# Patient Record
Sex: MALE | Race: WHITE | NOT HISPANIC OR LATINO | Employment: OTHER | URBAN - METROPOLITAN AREA
[De-identification: names, ages, dates, MRNs, and addresses within clinical notes are randomized per-mention and may not be internally consistent; named-entity substitution may affect disease eponyms.]

---

## 2017-02-22 ENCOUNTER — CONVERSION ENCOUNTER (OUTPATIENT)
Dept: FAMILY MEDICINE CLINIC | Facility: CLINIC | Age: 62
End: 2017-02-22

## 2017-02-22 LAB
ALBUMIN SERPL-MCNC: 4.5 G/DL (ref 3.6–5.1)
ALBUMIN/GLOB SERPL: ABNORMAL {RATIO} (ref 1–2.5)
ALP SERPL-CCNC: 61 UNITS/L (ref 40–115)
ALT SERPL-CCNC: 18 UNITS/L (ref 9–46)
AST SERPL-CCNC: 21 UNITS/L (ref 10–35)
BILIRUB SERPL-MCNC: 0.9 MG/DL (ref 0.2–1.2)
BUN SERPL-MCNC: 36 MG/DL (ref 7–25)
BUN/CREAT SERPL: ABNORMAL (ref 6–22)
CALCIUM SERPL-MCNC: 9.6 MG/DL (ref 8.6–10.3)
CHLORIDE SERPL-SCNC: 105 MMOL/L (ref 98–110)
CHOLEST SERPL-MCNC: 215 MG/DL (ref 125–200)
CHOLEST/HDLC SERPL: ABNORMAL {RATIO}
CO2 CONTENT VENOUS: 27 MMOL/L (ref 20–31)
CONV TOTAL PROTEIN: 7.5 G/DL (ref 6.1–8.1)
CREAT UR-MCNC: 0.94 MG/DL (ref 0.7–1.25)
GLOBULIN UR ELPH-MCNC: ABNORMAL G/DL (ref 1.9–3.7)
GLUCOSE SERPL-MCNC: 85 MG/DL (ref 65–99)
HDLC SERPL-MCNC: 81 MG/DL
LDLC SERPL CALC-MCNC: ABNORMAL MG/DL
POTASSIUM SERPL-SCNC: 4.3 MMOL/L (ref 3.5–5.3)
PSA SERPL-MCNC: 0.6 NG/ML
SODIUM SERPL-SCNC: 138 MMOL/L (ref 135–146)
TRIGL SERPL-MCNC: 69 MG/DL
TSH SERPL-ACNC: 6.9 MICROINTL UNITS/ML (ref 0.4–4.5)

## 2017-04-24 ENCOUNTER — ON CAMPUS - OUTPATIENT (AMBULATORY)
Dept: URBAN - METROPOLITAN AREA HOSPITAL 2 | Facility: HOSPITAL | Age: 62
End: 2017-04-24
Payer: COMMERCIAL

## 2017-04-24 ENCOUNTER — OFFICE (AMBULATORY)
Dept: URBAN - METROPOLITAN AREA CLINIC 64 | Facility: CLINIC | Age: 62
End: 2017-04-24

## 2017-04-24 VITALS
DIASTOLIC BLOOD PRESSURE: 81 MMHG | DIASTOLIC BLOOD PRESSURE: 75 MMHG | HEART RATE: 107 BPM | OXYGEN SATURATION: 100 % | SYSTOLIC BLOOD PRESSURE: 129 MMHG | SYSTOLIC BLOOD PRESSURE: 125 MMHG | DIASTOLIC BLOOD PRESSURE: 85 MMHG | RESPIRATION RATE: 16 BRPM | OXYGEN SATURATION: 97 % | DIASTOLIC BLOOD PRESSURE: 65 MMHG | HEART RATE: 86 BPM | WEIGHT: 185 LBS | OXYGEN SATURATION: 99 % | DIASTOLIC BLOOD PRESSURE: 67 MMHG | OXYGEN SATURATION: 98 % | HEART RATE: 71 BPM | HEART RATE: 63 BPM | HEART RATE: 69 BPM | RESPIRATION RATE: 17 BRPM | SYSTOLIC BLOOD PRESSURE: 109 MMHG | SYSTOLIC BLOOD PRESSURE: 123 MMHG | SYSTOLIC BLOOD PRESSURE: 124 MMHG | DIASTOLIC BLOOD PRESSURE: 78 MMHG | HEART RATE: 74 BPM | HEART RATE: 76 BPM | HEIGHT: 71 IN | RESPIRATION RATE: 18 BRPM | SYSTOLIC BLOOD PRESSURE: 151 MMHG | SYSTOLIC BLOOD PRESSURE: 128 MMHG | TEMPERATURE: 98.6 F

## 2017-04-24 DIAGNOSIS — K63.5 POLYP OF COLON: ICD-10-CM

## 2017-04-24 DIAGNOSIS — K64.0 FIRST DEGREE HEMORRHOIDS: ICD-10-CM

## 2017-04-24 DIAGNOSIS — Z86.010 PERSONAL HISTORY OF COLONIC POLYPS: ICD-10-CM

## 2017-04-24 PROBLEM — D12.0 BENIGN NEOPLASM OF CECUM: Status: ACTIVE | Noted: 2017-04-24

## 2017-04-24 LAB
GI HISTOLOGY: A. UNSPECIFIED: (no result)
GI HISTOLOGY: PDF REPORT: (no result)

## 2017-04-24 PROCEDURE — 45380 COLONOSCOPY AND BIOPSY: CPT | Mod: 33 | Performed by: INTERNAL MEDICINE

## 2017-04-24 PROCEDURE — 88305 TISSUE EXAM BY PATHOLOGIST: CPT | Performed by: INTERNAL MEDICINE

## 2017-04-24 RX ADMIN — PROPOFOL: 10 INJECTION, EMULSION INTRAVENOUS at 08:15

## 2017-05-12 ENCOUNTER — HOSPITAL ENCOUNTER (OUTPATIENT)
Dept: FAMILY MEDICINE CLINIC | Facility: CLINIC | Age: 62
Setting detail: SPECIMEN
Discharge: HOME | End: 2017-05-12
Attending: FAMILY MEDICINE | Admitting: FAMILY MEDICINE

## 2017-05-12 LAB
T3 SERPL-MCNC: 0.78 NG/ML (ref 0.87–1.78)
T4 FREE SERPL-MCNC: 1.2 NG/DL (ref 0.58–1.64)
TSH SERPL-ACNC: 1.06 UIU/ML (ref 0.34–5.6)

## 2017-12-04 ENCOUNTER — CONVERSION ENCOUNTER (OUTPATIENT)
Dept: FAMILY MEDICINE CLINIC | Facility: CLINIC | Age: 62
End: 2017-12-04

## 2018-03-07 ENCOUNTER — HOSPITAL ENCOUNTER (OUTPATIENT)
Dept: FAMILY MEDICINE CLINIC | Facility: CLINIC | Age: 63
Setting detail: SPECIMEN
Discharge: HOME | End: 2018-03-07
Attending: FAMILY MEDICINE | Admitting: FAMILY MEDICINE

## 2019-03-06 ENCOUNTER — HOSPITAL ENCOUNTER (OUTPATIENT)
Dept: PHYSICAL THERAPY | Facility: HOSPITAL | Age: 64
Setting detail: RECURRING SERIES
Discharge: HOME | End: 2019-05-14
Attending: FAMILY MEDICINE | Admitting: FAMILY MEDICINE

## 2019-03-15 ENCOUNTER — HOSPITAL ENCOUNTER (OUTPATIENT)
Dept: FAMILY MEDICINE CLINIC | Facility: CLINIC | Age: 64
Setting detail: SPECIMEN
Discharge: HOME | End: 2019-03-15
Attending: FAMILY MEDICINE | Admitting: FAMILY MEDICINE

## 2019-03-15 LAB
ALBUMIN SERPL-MCNC: 4.2 G/DL (ref 3.5–4.8)
ALBUMIN/GLOB SERPL: 1.4 {RATIO} (ref 1–1.7)
ALP SERPL-CCNC: 62 IU/L (ref 32–91)
ALT SERPL-CCNC: 19 IU/L (ref 17–63)
ANION GAP SERPL CALC-SCNC: 13.5 MMOL/L (ref 10–20)
AST SERPL-CCNC: 18 IU/L (ref 15–41)
BASOPHILS # BLD AUTO: 0 10*3/UL (ref 0–0.2)
BASOPHILS NFR BLD AUTO: 1 % (ref 0–2)
BILIRUB SERPL-MCNC: 0.8 MG/DL (ref 0.3–1.2)
BILIRUB UR QL STRIP: NEGATIVE MG/DL
BUN SERPL-MCNC: 24 MG/DL (ref 8–20)
BUN/CREAT SERPL: 24 (ref 6.2–20.3)
CALCIUM SERPL-MCNC: 9.3 MG/DL (ref 8.9–10.3)
CASTS URNS QL MICRO: ABNORMAL /[LPF]
CHLORIDE SERPL-SCNC: 105 MMOL/L (ref 101–111)
CHOLEST SERPL-MCNC: 192 MG/DL
CHOLEST/HDLC SERPL: 2.7 {RATIO}
COLOR UR: YELLOW
CONV BACTERIA IN URINE MICRO: NEGATIVE
CONV CLARITY OF URINE: CLEAR
CONV CO2: 25 MMOL/L (ref 22–32)
CONV HYALINE CASTS IN URINE MICRO: 0 /[LPF] (ref 0–5)
CONV LDL CHOLESTEROL DIRECT: 106 MG/DL (ref 0–100)
CONV PROTEIN IN URINE BY AUTOMATED TEST STRIP: NEGATIVE MG/DL
CONV SMALL ROUND CELLS: ABNORMAL /[HPF]
CONV TOTAL PROTEIN: 7.2 G/DL (ref 6.1–7.9)
CONV UROBILINOGEN IN URINE BY AUTOMATED TEST STRIP: 0.2 MG/DL
CREAT UR-MCNC: 1 MG/DL (ref 0.7–1.2)
CULTURE INDICATED?: ABNORMAL
DIFFERENTIAL METHOD BLD: (no result)
EOSINOPHIL # BLD AUTO: 0.2 10*3/UL (ref 0–0.3)
EOSINOPHIL # BLD AUTO: 4 % (ref 0–3)
ERYTHROCYTE [DISTWIDTH] IN BLOOD BY AUTOMATED COUNT: 13.6 % (ref 11.5–14.5)
GLOBULIN UR ELPH-MCNC: 3 G/DL (ref 2.5–3.8)
GLUCOSE SERPL-MCNC: 92 MG/DL (ref 65–99)
GLUCOSE UR QL: NEGATIVE MG/DL
HCT VFR BLD AUTO: 44.4 % (ref 40–54)
HDLC SERPL-MCNC: 71 MG/DL
HGB BLD-MCNC: 15 G/DL (ref 14–18)
HGB UR QL STRIP: ABNORMAL
KETONES UR QL STRIP: NEGATIVE MG/DL
LDLC/HDLC SERPL: 1.5 {RATIO}
LEUKOCYTE ESTERASE UR QL STRIP: NEGATIVE
LIPID INTERPRETATION: ABNORMAL
LYMPHOCYTES # BLD AUTO: 1 10*3/UL (ref 0.8–4.8)
LYMPHOCYTES NFR BLD AUTO: 20 % (ref 18–42)
MCH RBC QN AUTO: 31.4 PG (ref 26–32)
MCHC RBC AUTO-ENTMCNC: 33.9 G/DL (ref 32–36)
MCV RBC AUTO: 92.8 FL (ref 80–94)
MONOCYTES # BLD AUTO: 0.4 10*3/UL (ref 0.1–1.3)
MONOCYTES NFR BLD AUTO: 9 % (ref 2–11)
NEUTROPHILS # BLD AUTO: 3.4 10*3/UL (ref 2.3–8.6)
NEUTROPHILS NFR BLD AUTO: 66 % (ref 50–75)
NITRITE UR QL STRIP: NEGATIVE
NRBC BLD AUTO-RTO: 0 /100{WBCS}
NRBC/RBC NFR BLD MANUAL: 0 10*3/UL
PH UR STRIP.AUTO: 7 [PH] (ref 4.5–8)
PLATELET # BLD AUTO: 282 10*3/UL (ref 150–450)
PMV BLD AUTO: 7.6 FL (ref 7.4–10.4)
POTASSIUM SERPL-SCNC: 4.5 MMOL/L (ref 3.6–5.1)
RBC # BLD AUTO: 4.78 10*6/UL (ref 4.6–6)
RBC #/AREA URNS HPF: 3 /[HPF] (ref 0–3)
SODIUM SERPL-SCNC: 139 MMOL/L (ref 136–144)
SP GR UR: 1.01 (ref 1–1.03)
SPERM URNS QL MICRO: ABNORMAL /[HPF]
SQUAMOUS SPT QL MICRO: 0 /[HPF] (ref 0–5)
TRIGL SERPL-MCNC: 59 MG/DL
UNIDENT CRYS URNS QL MICRO: ABNORMAL /[HPF]
VLDLC SERPL CALC-MCNC: 15.7 MG/DL
WBC # BLD AUTO: 5 10*3/UL (ref 4.5–11.5)
WBC #/AREA URNS HPF: 0 /[HPF] (ref 0–5)
YEAST SPEC QL WET PREP: ABNORMAL /[HPF]

## 2019-04-10 ENCOUNTER — OFFICE VISIT (OUTPATIENT)
Dept: NEUROSURGERY | Facility: CLINIC | Age: 64
End: 2019-04-10

## 2019-04-10 VITALS
HEIGHT: 71 IN | SYSTOLIC BLOOD PRESSURE: 130 MMHG | DIASTOLIC BLOOD PRESSURE: 70 MMHG | BODY MASS INDEX: 23.74 KG/M2 | WEIGHT: 169.6 LBS | HEART RATE: 70 BPM

## 2019-04-10 DIAGNOSIS — M62.81 MUSCLE WEAKNESS OF LEFT ARM: ICD-10-CM

## 2019-04-10 DIAGNOSIS — M54.12 CERVICAL RADICULOPATHY: Primary | ICD-10-CM

## 2019-04-10 PROCEDURE — 99204 OFFICE O/P NEW MOD 45 MIN: CPT | Performed by: PHYSICIAN ASSISTANT

## 2019-04-10 RX ORDER — LEVOTHYROXINE SODIUM 0.15 MG/1
150 TABLET ORAL DAILY
COMMUNITY
Start: 2019-03-16 | End: 2019-12-12 | Stop reason: SDUPTHER

## 2019-04-10 RX ORDER — AMLODIPINE BESYLATE 5 MG/1
5 TABLET ORAL DAILY
COMMUNITY
Start: 2019-03-12 | End: 2019-08-17 | Stop reason: SDUPTHER

## 2019-04-10 RX ORDER — DOXAZOSIN MESYLATE 4 MG/1
4 TABLET ORAL NIGHTLY
COMMUNITY
Start: 2019-03-16 | End: 2019-12-12 | Stop reason: SDUPTHER

## 2019-04-10 RX ORDER — MELOXICAM 15 MG/1
15 TABLET ORAL DAILY
COMMUNITY
Start: 2019-03-19 | End: 2019-05-08 | Stop reason: HOSPADM

## 2019-04-10 NOTE — PROGRESS NOTES
Subjective   Patient ID: John Jones is a 63 y.o. male is here today as a self referral for neck and left arm pain.  He denies any cause or injury.  He is currently going to PT with mild to moderate relief of arm pain but still has arm weakness and numbness. Mr. Jones takes Mobic for knee pain.      Neck Pain    This is a new problem. The current episode started more than 1 month ago (8-10 weeks ). The problem occurs constantly. The problem has been gradually worsening. The pain is associated with nothing. The pain is present in the left side. The quality of the pain is described as shooting, stabbing and aching. The pain is at a severity of 0/10. The patient is experiencing no pain. The symptoms are aggravated by position. Associated symptoms include numbness, photophobia, tingling and weakness. Pertinent negatives include no headaches. Treatments tried: PT  The treatment provided mild relief.   Arm Pain    The pain is present in the left shoulder and upper left arm. The pain radiates to the left arm. The pain has been intermittent since the incident. Associated symptoms include muscle weakness, numbness and tingling. Treatments tried: PT  The treatment provided moderate relief.       The following portions of the patient's history were reviewed and updated as appropriate: allergies, current medications, past family history, past medical history, past social history, past surgical history and problem list.    Review of Systems   Eyes: Positive for photophobia.   Genitourinary: Negative for difficulty urinating.   Musculoskeletal: Positive for arthralgias, joint swelling, neck pain (L arm pain ) and neck stiffness. Negative for gait problem.   Neurological: Positive for tingling, weakness and numbness. Negative for headaches.   All other systems reviewed and are negative.      Objective   Physical Exam   Constitutional: He is oriented to person, place, and time. He appears well-developed and well-nourished.   HENT:    Head: Normocephalic and atraumatic.   Right Ear: External ear normal.   Left Ear: External ear normal.   Eyes: Conjunctivae and EOM are normal. Pupils are equal, round, and reactive to light. Right eye exhibits no discharge. Left eye exhibits no discharge.   Neck: Normal range of motion. Neck supple. No tracheal deviation present.   Cardiovascular: Intact distal pulses.   Pulmonary/Chest: Effort normal. No stridor. No respiratory distress.   Musculoskeletal: Normal range of motion. He exhibits no edema, tenderness or deformity.   Neurological: He is alert and oriented to person, place, and time. He has normal reflexes. He displays no atrophy, no tremor and normal reflexes. No cranial nerve deficit or sensory deficit. He exhibits normal muscle tone. He displays a negative Romberg sign. He displays no seizure activity. Coordination and gait normal.   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   Psychiatric: He has a normal mood and affect. His behavior is normal. Judgment and thought content normal.   Nursing note and vitals reviewed.    Neurologic Exam     Mental Status   Oriented to person, place, and time.     Cranial Nerves     CN III, IV, VI   Pupils are equal, round, and reactive to light.  Extraocular motions are normal.       Assessment/Plan   Independent Review of Radiographic Studies:      Medical Decision Making:    Mr. Jones came to see us today for a 3-4-month history of pain that began in the left scapula and upper arm.  The pain began without accident or injury and was initially quite severe.  Over time the pain has actually resolved but unfortunately he continues to have numbness and tingling down the left arm and weakness as well.  No significant neck pain or gait issues or incontinence.  He has been going to  physical therapy for over a month which again did help with the pain but not the weakness.  He has not had any imaging or other treatment.  His exam does demonstrate atrophy of the left bicep however he is not certain if this is entirely new as he did have a previous bicep tear and since that time noticed his bicep looked smaller.  However he does have left bicep weakness as well as left  weakness.  He is left-handed and this is limiting his ability to perform his usual daily activities.    No long tract signs.    I will send him for an MRI and x-rays and have him follow-up thereafter with Dr. Phoenix.  He understands that given the weakness if the imaging confirms significant nerve compression surgery would likely be discussed rather than considering any additional nonoperative measures.  He will call in the interim with any questions or concerns or changes.  John was seen today for neck pain and arm pain.    Diagnoses and all orders for this visit:    Cervical radiculopathy  -     MRI Cervical Spine Without Contrast; Future  -     XR spine cervical complete w flex ext; Future    Muscle weakness of left arm  -     MRI Cervical Spine Without Contrast; Future  -     XR spine cervical complete w flex ext; Future      Return for follow up after radiology test with Dr. Phoenix (ASAP after MRI). .

## 2019-04-11 ENCOUNTER — TELEPHONE (OUTPATIENT)
Dept: NEUROSURGERY | Facility: CLINIC | Age: 64
End: 2019-04-11

## 2019-04-22 ENCOUNTER — HOSPITAL ENCOUNTER (OUTPATIENT)
Dept: GENERAL RADIOLOGY | Facility: HOSPITAL | Age: 64
Discharge: HOME | End: 2019-04-22

## 2019-04-22 ENCOUNTER — HOSPITAL ENCOUNTER (OUTPATIENT)
Dept: MRI IMAGING | Facility: HOSPITAL | Age: 64
Discharge: HOME | End: 2019-04-22
Admitting: PHYSICIAN ASSISTANT

## 2019-04-22 DIAGNOSIS — M62.81 MUSCLE WEAKNESS OF LEFT ARM: ICD-10-CM

## 2019-04-22 DIAGNOSIS — M54.12 CERVICAL RADICULOPATHY: ICD-10-CM

## 2019-04-22 PROCEDURE — 72141 MRI NECK SPINE W/O DYE: CPT

## 2019-04-22 PROCEDURE — 72052 X-RAY EXAM NECK SPINE 6/>VWS: CPT

## 2019-04-26 NOTE — PROGRESS NOTES
Subjective   Patient ID: John Jones is a 63 y.o. male is here today for follow-up with a new Cervical MRI and XR that was ordered for neck pain that radiates into his left arm with numbness, tingling and weakness.    History of Present Illness     This patient returns today.  He continues with weakness and numbness in his left arm.  Did physical therapy and the pain went away but his arm continued with weakness.    The following portions of the patient's history were reviewed and updated as appropriate: allergies, current medications, past family history, past medical history, past social history, past surgical history and problem list.    Review of Systems   Respiratory: Negative for chest tightness and shortness of breath.    Cardiovascular: Negative for chest pain.   Musculoskeletal: Positive for neck pain.        Left arm pain   Neurological: Positive for weakness and numbness.        Positive for tingling   All other systems reviewed and are negative.      Objective   Physical Exam   Constitutional: He is oriented to person, place, and time. He appears well-developed and well-nourished.   Neurological: He is oriented to person, place, and time.     Neurologic Exam     Mental Status   Oriented to person, place, and time.       Assessment/Plan   Independent Review of Radiographic Studies:      I reviewed his plain films and his MRI myself.  Plain films show severe degenerative disc disease at C3-4 and C5-6 as well as C6-7.  There is no evidence of abnormal movement on flexion and extension films however.  On the MRI itself there is a widely patent canal and neuroforamina at C2-3.  C3-4 shows some central stenosis and a spondylolisthesis at that level.  C4-5 shows some central stenosis although mild as well as foraminal stenosis especially on the left.  C5-6 shows severe canal stenosis with cord compression and C6-7 shows a large herniated disc on the left side.  C7-T1 looks okay.    Medical Decision Making:       I told the patient and his wife about the imaging.  I told him that there is stenosis at several levels of the cervical spine but his symptoms seem to be coming mostly from C5-6 and C6-7.  I told him if we correct these it may still be necessary to do something to the other levels in the future but right now I do not think they are contributing very much.  I told the patient about the surgery which is called an anterior cervical discectomy.  I explained that there is an 80% chance of getting rid of the arm pain and any other arm symptoms.  I also explained that the patient would still have neck pain.  Initially this will be quite severe however it will improve with healing from the surgery.  There is also a risk of infection, bleeding, paralysis, and anesthetic risk.  There is a risk of damage to the soft tissues of the neck such as the esophagus, carotids, and trachea.  All of these risks are about 2 or 3%.  There is about a 5% chance of nonunion or failure of the instrumentation.  There is also a about a 5% chance of hoarseness and trouble swallowing do to damage to the recurrent laryngeal nerve.  We discussed the postoperative hospital and home course as well.  The patient does ask to proceed.    He will need to be scheduled for a: C5-C7 anterior cervical discectomy, fusion and instrumentation    John was seen today for neck pain.    Diagnoses and all orders for this visit:    Cervical radiculopathy  -     Case Request; Standing  -     ceFAZolin (ANCEF) 2 g in sodium chloride 0.9 % 100 mL IVPB  -     Case Request    Other orders  -     Follow anesthesia standing orders.  -     Obtain informed consent  -     Provide NPO Instructions to Patient; Future  -     SCD (sequential compression device)- to be placed on patient in Pre-op; Standing      Return for 2-3 week post op.

## 2019-04-29 ENCOUNTER — OFFICE VISIT (OUTPATIENT)
Dept: NEUROSURGERY | Facility: CLINIC | Age: 64
End: 2019-04-29

## 2019-04-29 VITALS — DIASTOLIC BLOOD PRESSURE: 77 MMHG | SYSTOLIC BLOOD PRESSURE: 147 MMHG | HEART RATE: 71 BPM

## 2019-04-29 DIAGNOSIS — M54.12 CERVICAL RADICULOPATHY: Primary | ICD-10-CM

## 2019-04-29 PROCEDURE — 99213 OFFICE O/P EST LOW 20 MIN: CPT | Performed by: NEUROLOGICAL SURGERY

## 2019-04-29 RX ORDER — CEFAZOLIN SODIUM 2 G/100ML
2 INJECTION, SOLUTION INTRAVENOUS ONCE
Status: CANCELLED | OUTPATIENT
Start: 2019-05-06 | End: 2019-04-29

## 2019-04-30 ENCOUNTER — APPOINTMENT (OUTPATIENT)
Dept: PREADMISSION TESTING | Facility: HOSPITAL | Age: 64
End: 2019-04-30

## 2019-04-30 VITALS
TEMPERATURE: 97.7 F | HEART RATE: 72 BPM | BODY MASS INDEX: 23.59 KG/M2 | RESPIRATION RATE: 16 BRPM | OXYGEN SATURATION: 99 % | WEIGHT: 168.5 LBS | HEIGHT: 71 IN | SYSTOLIC BLOOD PRESSURE: 141 MMHG | DIASTOLIC BLOOD PRESSURE: 77 MMHG

## 2019-04-30 LAB
ANION GAP SERPL CALCULATED.3IONS-SCNC: 13 MMOL/L
BUN BLD-MCNC: 36 MG/DL (ref 8–23)
BUN/CREAT SERPL: 37.9 (ref 7–25)
CALCIUM SPEC-SCNC: 9.2 MG/DL (ref 8.6–10.5)
CHLORIDE SERPL-SCNC: 104 MMOL/L (ref 98–107)
CO2 SERPL-SCNC: 23 MMOL/L (ref 22–29)
CREAT BLD-MCNC: 0.95 MG/DL (ref 0.76–1.27)
DEPRECATED RDW RBC AUTO: 45.9 FL (ref 37–54)
ERYTHROCYTE [DISTWIDTH] IN BLOOD BY AUTOMATED COUNT: 13.4 % (ref 12.3–15.4)
GFR SERPL CREATININE-BSD FRML MDRD: 80 ML/MIN/1.73
GLUCOSE BLD-MCNC: 91 MG/DL (ref 65–99)
HCT VFR BLD AUTO: 41.8 % (ref 37.5–51)
HGB BLD-MCNC: 13.7 G/DL (ref 13–17.7)
MCH RBC QN AUTO: 30.7 PG (ref 26.6–33)
MCHC RBC AUTO-ENTMCNC: 32.8 G/DL (ref 31.5–35.7)
MCV RBC AUTO: 93.7 FL (ref 79–97)
PLATELET # BLD AUTO: 267 10*3/MM3 (ref 140–450)
PMV BLD AUTO: 9.7 FL (ref 6–12)
POTASSIUM BLD-SCNC: 3.6 MMOL/L (ref 3.5–5.2)
RBC # BLD AUTO: 4.46 10*6/MM3 (ref 4.14–5.8)
SODIUM BLD-SCNC: 140 MMOL/L (ref 136–145)
WBC NRBC COR # BLD: 7.9 10*3/MM3 (ref 3.4–10.8)

## 2019-04-30 PROCEDURE — 85027 COMPLETE CBC AUTOMATED: CPT | Performed by: NEUROLOGICAL SURGERY

## 2019-04-30 PROCEDURE — 80048 BASIC METABOLIC PNL TOTAL CA: CPT | Performed by: NEUROLOGICAL SURGERY

## 2019-04-30 PROCEDURE — 93010 ELECTROCARDIOGRAM REPORT: CPT | Performed by: INTERNAL MEDICINE

## 2019-04-30 PROCEDURE — 36415 COLL VENOUS BLD VENIPUNCTURE: CPT

## 2019-04-30 PROCEDURE — 93005 ELECTROCARDIOGRAM TRACING: CPT

## 2019-05-06 ENCOUNTER — HOSPITAL ENCOUNTER (OUTPATIENT)
Facility: HOSPITAL | Age: 64
Discharge: HOME | End: 2019-05-08
Attending: NEUROLOGICAL SURGERY | Admitting: NEUROLOGICAL SURGERY

## 2019-05-06 ENCOUNTER — ANESTHESIA (OUTPATIENT)
Dept: PERIOP | Facility: HOSPITAL | Age: 64
End: 2019-05-06

## 2019-05-06 ENCOUNTER — APPOINTMENT (OUTPATIENT)
Dept: GENERAL RADIOLOGY | Facility: HOSPITAL | Age: 64
End: 2019-05-06

## 2019-05-06 ENCOUNTER — ANESTHESIA EVENT (OUTPATIENT)
Dept: PERIOP | Facility: HOSPITAL | Age: 64
End: 2019-05-06

## 2019-05-06 DIAGNOSIS — M54.12 CERVICAL RADICULOPATHY: Primary | ICD-10-CM

## 2019-05-06 DIAGNOSIS — M54.12 CERVICAL RADICULOPATHY: ICD-10-CM

## 2019-05-06 PROCEDURE — 22551 ARTHRD ANT NTRBDY CERVICAL: CPT | Performed by: NEUROLOGICAL SURGERY

## 2019-05-06 PROCEDURE — 25010000002 ONDANSETRON PER 1 MG: Performed by: NURSE ANESTHETIST, CERTIFIED REGISTERED

## 2019-05-06 PROCEDURE — 94799 UNLISTED PULMONARY SVC/PX: CPT

## 2019-05-06 PROCEDURE — 25010000002 PHENYLEPHRINE PER 1 ML: Performed by: NURSE ANESTHETIST, CERTIFIED REGISTERED

## 2019-05-06 PROCEDURE — 22853 INSJ BIOMECHANICAL DEVICE: CPT | Performed by: NEUROLOGICAL SURGERY

## 2019-05-06 PROCEDURE — 25010000002 DEXAMETHASONE PER 1 MG: Performed by: NURSE ANESTHETIST, CERTIFIED REGISTERED

## 2019-05-06 PROCEDURE — 20930 SP BONE ALGRFT MORSEL ADD-ON: CPT | Performed by: NEUROLOGICAL SURGERY

## 2019-05-06 PROCEDURE — L0120 CERV FLEX N/ADJ FOAM PRE OTS: HCPCS | Performed by: NEUROLOGICAL SURGERY

## 2019-05-06 PROCEDURE — 25010000003 CEFAZOLIN IN DEXTROSE 2-4 GM/100ML-% SOLUTION: Performed by: NEUROLOGICAL SURGERY

## 2019-05-06 PROCEDURE — C1713 ANCHOR/SCREW BN/BN,TIS/BN: HCPCS | Performed by: NEUROLOGICAL SURGERY

## 2019-05-06 PROCEDURE — G0378 HOSPITAL OBSERVATION PER HR: HCPCS

## 2019-05-06 PROCEDURE — 22552 ARTHRD ANT NTRBD CERVICAL EA: CPT | Performed by: NEUROLOGICAL SURGERY

## 2019-05-06 PROCEDURE — 63710000001 PROMETHAZINE PER 12.5 MG: Performed by: NEUROLOGICAL SURGERY

## 2019-05-06 PROCEDURE — 25010000002 FENTANYL CITRATE (PF) 100 MCG/2ML SOLUTION: Performed by: ANESTHESIOLOGY

## 2019-05-06 PROCEDURE — 25010000002 NEOSTIGMINE PER 0.5 MG: Performed by: NURSE ANESTHETIST, CERTIFIED REGISTERED

## 2019-05-06 PROCEDURE — 25010000002 HYDROMORPHONE PER 4 MG: Performed by: NURSE ANESTHETIST, CERTIFIED REGISTERED

## 2019-05-06 PROCEDURE — 76000 FLUOROSCOPY <1 HR PHYS/QHP: CPT

## 2019-05-06 PROCEDURE — 25810000003 SODIUM CHLORIDE 0.9 % WITH KCL 20 MEQ 20-0.9 MEQ/L-% SOLUTION: Performed by: NEUROLOGICAL SURGERY

## 2019-05-06 PROCEDURE — 72040 X-RAY EXAM NECK SPINE 2-3 VW: CPT

## 2019-05-06 PROCEDURE — 25010000002 PROPOFOL 10 MG/ML EMULSION: Performed by: NURSE ANESTHETIST, CERTIFIED REGISTERED

## 2019-05-06 PROCEDURE — 22845 INSERT SPINE FIXATION DEVICE: CPT | Performed by: NEUROLOGICAL SURGERY

## 2019-05-06 DEVICE — PLATE 7200045 ATL VISION ELITE 45MM
Type: IMPLANTABLE DEVICE | Site: SPINE CERVICAL | Status: FUNCTIONAL
Brand: ATLANTIS® ANTERIOR CERVICAL PLATE SYSTEM

## 2019-05-06 DEVICE — IMPLANT 6240864 ANATOMIC 16X14X8MM
Type: IMPLANTABLE DEVICE | Site: SPINE CERVICAL | Status: FUNCTIONAL
Brand: VERTE-STACK® SPINAL SYSTEM

## 2019-05-06 DEVICE — PUTTY DBF GRAFTON 3CC: Type: IMPLANTABLE DEVICE | Site: SPINE CERVICAL | Status: FUNCTIONAL

## 2019-05-06 DEVICE — SSC BONE WAX
Type: IMPLANTABLE DEVICE | Site: SPINE CERVICAL | Status: FUNCTIONAL
Brand: SSC BONE WAX

## 2019-05-06 RX ORDER — ONDANSETRON 2 MG/ML
4 INJECTION INTRAMUSCULAR; INTRAVENOUS EVERY 6 HOURS PRN
Status: DISCONTINUED | OUTPATIENT
Start: 2019-05-06 | End: 2019-05-08 | Stop reason: HOSPADM

## 2019-05-06 RX ORDER — PROMETHAZINE HYDROCHLORIDE 25 MG/ML
12.5 INJECTION, SOLUTION INTRAMUSCULAR; INTRAVENOUS ONCE AS NEEDED
Status: DISCONTINUED | OUTPATIENT
Start: 2019-05-06 | End: 2019-05-06 | Stop reason: HOSPADM

## 2019-05-06 RX ORDER — SODIUM CHLORIDE 0.9 % (FLUSH) 0.9 %
3-10 SYRINGE (ML) INJECTION AS NEEDED
Status: DISCONTINUED | OUTPATIENT
Start: 2019-05-06 | End: 2019-05-08 | Stop reason: HOSPADM

## 2019-05-06 RX ORDER — FENTANYL CITRATE 50 UG/ML
50 INJECTION, SOLUTION INTRAMUSCULAR; INTRAVENOUS
Status: DISCONTINUED | OUTPATIENT
Start: 2019-05-06 | End: 2019-05-06 | Stop reason: HOSPADM

## 2019-05-06 RX ORDER — MAGNESIUM HYDROXIDE 1200 MG/15ML
LIQUID ORAL AS NEEDED
Status: DISCONTINUED | OUTPATIENT
Start: 2019-05-06 | End: 2019-05-06 | Stop reason: HOSPADM

## 2019-05-06 RX ORDER — SODIUM CHLORIDE 0.9 % (FLUSH) 0.9 %
1-10 SYRINGE (ML) INJECTION AS NEEDED
Status: DISCONTINUED | OUTPATIENT
Start: 2019-05-06 | End: 2019-05-06 | Stop reason: HOSPADM

## 2019-05-06 RX ORDER — ACETAMINOPHEN 325 MG/1
650 TABLET ORAL ONCE AS NEEDED
Status: DISCONTINUED | OUTPATIENT
Start: 2019-05-06 | End: 2019-05-06 | Stop reason: HOSPADM

## 2019-05-06 RX ORDER — HYDROCODONE BITARTRATE AND ACETAMINOPHEN 5; 325 MG/1; MG/1
1 TABLET ORAL EVERY 4 HOURS PRN
Status: DISCONTINUED | OUTPATIENT
Start: 2019-05-06 | End: 2019-05-08 | Stop reason: HOSPADM

## 2019-05-06 RX ORDER — FLUMAZENIL 0.1 MG/ML
0.2 INJECTION INTRAVENOUS AS NEEDED
Status: DISCONTINUED | OUTPATIENT
Start: 2019-05-06 | End: 2019-05-06 | Stop reason: HOSPADM

## 2019-05-06 RX ORDER — NALOXONE HCL 0.4 MG/ML
0.4 VIAL (ML) INJECTION
Status: DISCONTINUED | OUTPATIENT
Start: 2019-05-06 | End: 2019-05-08 | Stop reason: HOSPADM

## 2019-05-06 RX ORDER — MORPHINE SULFATE 2 MG/ML
2 INJECTION, SOLUTION INTRAMUSCULAR; INTRAVENOUS EVERY 4 HOURS PRN
Status: DISCONTINUED | OUTPATIENT
Start: 2019-05-06 | End: 2019-05-08 | Stop reason: HOSPADM

## 2019-05-06 RX ORDER — FAMOTIDINE 10 MG/ML
20 INJECTION, SOLUTION INTRAVENOUS ONCE
Status: COMPLETED | OUTPATIENT
Start: 2019-05-06 | End: 2019-05-06

## 2019-05-06 RX ORDER — HYDRALAZINE HYDROCHLORIDE 20 MG/ML
5 INJECTION INTRAMUSCULAR; INTRAVENOUS
Status: DISCONTINUED | OUTPATIENT
Start: 2019-05-06 | End: 2019-05-06 | Stop reason: HOSPADM

## 2019-05-06 RX ORDER — EPHEDRINE SULFATE 50 MG/ML
5 INJECTION, SOLUTION INTRAVENOUS ONCE AS NEEDED
Status: DISCONTINUED | OUTPATIENT
Start: 2019-05-06 | End: 2019-05-06 | Stop reason: HOSPADM

## 2019-05-06 RX ORDER — LABETALOL HYDROCHLORIDE 5 MG/ML
5 INJECTION, SOLUTION INTRAVENOUS
Status: DISCONTINUED | OUTPATIENT
Start: 2019-05-06 | End: 2019-05-06 | Stop reason: HOSPADM

## 2019-05-06 RX ORDER — MIDAZOLAM HYDROCHLORIDE 1 MG/ML
1 INJECTION INTRAMUSCULAR; INTRAVENOUS
Status: DISCONTINUED | OUTPATIENT
Start: 2019-05-06 | End: 2019-05-06 | Stop reason: HOSPADM

## 2019-05-06 RX ORDER — TERAZOSIN 5 MG/1
5 CAPSULE ORAL NIGHTLY
Status: DISCONTINUED | OUTPATIENT
Start: 2019-05-06 | End: 2019-05-08 | Stop reason: HOSPADM

## 2019-05-06 RX ORDER — PROMETHAZINE HYDROCHLORIDE 25 MG/1
25 TABLET ORAL ONCE AS NEEDED
Status: DISCONTINUED | OUTPATIENT
Start: 2019-05-06 | End: 2019-05-06 | Stop reason: HOSPADM

## 2019-05-06 RX ORDER — DEXAMETHASONE SODIUM PHOSPHATE 10 MG/ML
INJECTION INTRAMUSCULAR; INTRAVENOUS AS NEEDED
Status: DISCONTINUED | OUTPATIENT
Start: 2019-05-06 | End: 2019-05-06 | Stop reason: SURG

## 2019-05-06 RX ORDER — EPHEDRINE SULFATE 50 MG/ML
INJECTION, SOLUTION INTRAVENOUS AS NEEDED
Status: DISCONTINUED | OUTPATIENT
Start: 2019-05-06 | End: 2019-05-06 | Stop reason: SURG

## 2019-05-06 RX ORDER — NALOXONE HCL 0.4 MG/ML
0.2 VIAL (ML) INJECTION AS NEEDED
Status: DISCONTINUED | OUTPATIENT
Start: 2019-05-06 | End: 2019-05-06 | Stop reason: HOSPADM

## 2019-05-06 RX ORDER — LIDOCAINE HYDROCHLORIDE 20 MG/ML
INJECTION, SOLUTION INFILTRATION; PERINEURAL AS NEEDED
Status: DISCONTINUED | OUTPATIENT
Start: 2019-05-06 | End: 2019-05-06 | Stop reason: SURG

## 2019-05-06 RX ORDER — CEFAZOLIN SODIUM 2 G/100ML
2 INJECTION, SOLUTION INTRAVENOUS ONCE
Status: COMPLETED | OUTPATIENT
Start: 2019-05-06 | End: 2019-05-06

## 2019-05-06 RX ORDER — SODIUM CHLORIDE 0.9 % (FLUSH) 0.9 %
3 SYRINGE (ML) INJECTION EVERY 12 HOURS SCHEDULED
Status: DISCONTINUED | OUTPATIENT
Start: 2019-05-06 | End: 2019-05-08 | Stop reason: HOSPADM

## 2019-05-06 RX ORDER — MIDAZOLAM HYDROCHLORIDE 1 MG/ML
2 INJECTION INTRAMUSCULAR; INTRAVENOUS
Status: DISCONTINUED | OUTPATIENT
Start: 2019-05-06 | End: 2019-05-06 | Stop reason: HOSPADM

## 2019-05-06 RX ORDER — GLYCOPYRROLATE 0.2 MG/ML
INJECTION INTRAMUSCULAR; INTRAVENOUS AS NEEDED
Status: DISCONTINUED | OUTPATIENT
Start: 2019-05-06 | End: 2019-05-06 | Stop reason: SURG

## 2019-05-06 RX ORDER — PROPOFOL 10 MG/ML
VIAL (ML) INTRAVENOUS AS NEEDED
Status: DISCONTINUED | OUTPATIENT
Start: 2019-05-06 | End: 2019-05-06 | Stop reason: SURG

## 2019-05-06 RX ORDER — ONDANSETRON 4 MG/1
4 TABLET, FILM COATED ORAL EVERY 6 HOURS PRN
Status: DISCONTINUED | OUTPATIENT
Start: 2019-05-06 | End: 2019-05-08 | Stop reason: HOSPADM

## 2019-05-06 RX ORDER — LEVOTHYROXINE SODIUM 0.15 MG/1
150 TABLET ORAL
Status: DISCONTINUED | OUTPATIENT
Start: 2019-05-06 | End: 2019-05-08 | Stop reason: HOSPADM

## 2019-05-06 RX ORDER — CEFAZOLIN SODIUM 2 G/100ML
2 INJECTION, SOLUTION INTRAVENOUS EVERY 8 HOURS
Status: COMPLETED | OUTPATIENT
Start: 2019-05-06 | End: 2019-05-08

## 2019-05-06 RX ORDER — GABAPENTIN 300 MG/1
600 CAPSULE ORAL ONCE
Status: COMPLETED | OUTPATIENT
Start: 2019-05-06 | End: 2019-05-06

## 2019-05-06 RX ORDER — PROMETHAZINE HYDROCHLORIDE 25 MG/1
25 SUPPOSITORY RECTAL ONCE AS NEEDED
Status: DISCONTINUED | OUTPATIENT
Start: 2019-05-06 | End: 2019-05-06 | Stop reason: HOSPADM

## 2019-05-06 RX ORDER — DIPHENHYDRAMINE HYDROCHLORIDE 50 MG/ML
12.5 INJECTION INTRAMUSCULAR; INTRAVENOUS
Status: DISCONTINUED | OUTPATIENT
Start: 2019-05-06 | End: 2019-05-06 | Stop reason: HOSPADM

## 2019-05-06 RX ORDER — HYDROCODONE BITARTRATE AND ACETAMINOPHEN 7.5; 325 MG/1; MG/1
1 TABLET ORAL ONCE AS NEEDED
Status: DISCONTINUED | OUTPATIENT
Start: 2019-05-06 | End: 2019-05-06 | Stop reason: HOSPADM

## 2019-05-06 RX ORDER — SODIUM CHLORIDE AND POTASSIUM CHLORIDE 150; 900 MG/100ML; MG/100ML
100 INJECTION, SOLUTION INTRAVENOUS CONTINUOUS
Status: DISCONTINUED | OUTPATIENT
Start: 2019-05-06 | End: 2019-05-08 | Stop reason: HOSPADM

## 2019-05-06 RX ORDER — HYDROMORPHONE HYDROCHLORIDE 1 MG/ML
0.5 INJECTION, SOLUTION INTRAMUSCULAR; INTRAVENOUS; SUBCUTANEOUS
Status: DISCONTINUED | OUTPATIENT
Start: 2019-05-06 | End: 2019-05-06 | Stop reason: HOSPADM

## 2019-05-06 RX ORDER — ONDANSETRON 2 MG/ML
INJECTION INTRAMUSCULAR; INTRAVENOUS AS NEEDED
Status: DISCONTINUED | OUTPATIENT
Start: 2019-05-06 | End: 2019-05-06 | Stop reason: SURG

## 2019-05-06 RX ORDER — METHADONE HYDROCHLORIDE 10 MG/1
10 TABLET ORAL ONCE
Status: COMPLETED | OUTPATIENT
Start: 2019-05-06 | End: 2019-05-06

## 2019-05-06 RX ORDER — ROCURONIUM BROMIDE 10 MG/ML
INJECTION, SOLUTION INTRAVENOUS AS NEEDED
Status: DISCONTINUED | OUTPATIENT
Start: 2019-05-06 | End: 2019-05-06 | Stop reason: SURG

## 2019-05-06 RX ORDER — ONDANSETRON 2 MG/ML
4 INJECTION INTRAMUSCULAR; INTRAVENOUS ONCE AS NEEDED
Status: DISCONTINUED | OUTPATIENT
Start: 2019-05-06 | End: 2019-05-06 | Stop reason: HOSPADM

## 2019-05-06 RX ORDER — SODIUM CHLORIDE, SODIUM LACTATE, POTASSIUM CHLORIDE, CALCIUM CHLORIDE 600; 310; 30; 20 MG/100ML; MG/100ML; MG/100ML; MG/100ML
9 INJECTION, SOLUTION INTRAVENOUS CONTINUOUS
Status: DISCONTINUED | OUTPATIENT
Start: 2019-05-06 | End: 2019-05-06

## 2019-05-06 RX ORDER — PROMETHAZINE HYDROCHLORIDE 12.5 MG/1
12.5 TABLET ORAL EVERY 6 HOURS PRN
Status: DISCONTINUED | OUTPATIENT
Start: 2019-05-06 | End: 2019-05-08 | Stop reason: HOSPADM

## 2019-05-06 RX ORDER — AMLODIPINE BESYLATE 5 MG/1
5 TABLET ORAL DAILY
Status: DISCONTINUED | OUTPATIENT
Start: 2019-05-07 | End: 2019-05-08 | Stop reason: HOSPADM

## 2019-05-06 RX ORDER — DIPHENHYDRAMINE HCL 25 MG
25 CAPSULE ORAL
Status: DISCONTINUED | OUTPATIENT
Start: 2019-05-06 | End: 2019-05-06 | Stop reason: HOSPADM

## 2019-05-06 RX ORDER — LIDOCAINE HYDROCHLORIDE 10 MG/ML
0.5 INJECTION, SOLUTION EPIDURAL; INFILTRATION; INTRACAUDAL; PERINEURAL ONCE AS NEEDED
Status: DISCONTINUED | OUTPATIENT
Start: 2019-05-06 | End: 2019-05-06 | Stop reason: HOSPADM

## 2019-05-06 RX ORDER — LIDOCAINE HYDROCHLORIDE 40 MG/ML
SOLUTION TOPICAL AS NEEDED
Status: DISCONTINUED | OUTPATIENT
Start: 2019-05-06 | End: 2019-05-06 | Stop reason: SURG

## 2019-05-06 RX ORDER — OXYCODONE AND ACETAMINOPHEN 7.5; 325 MG/1; MG/1
1 TABLET ORAL ONCE AS NEEDED
Status: DISCONTINUED | OUTPATIENT
Start: 2019-05-06 | End: 2019-05-06 | Stop reason: HOSPADM

## 2019-05-06 RX ADMIN — EPHEDRINE SULFATE 10 MG: 50 INJECTION INTRAMUSCULAR; INTRAVENOUS; SUBCUTANEOUS at 10:10

## 2019-05-06 RX ADMIN — FENTANYL CITRATE 50 MCG: 50 INJECTION INTRAMUSCULAR; INTRAVENOUS at 11:28

## 2019-05-06 RX ADMIN — ONDANSETRON 4 MG: 2 INJECTION INTRAMUSCULAR; INTRAVENOUS at 09:36

## 2019-05-06 RX ADMIN — FENTANYL CITRATE 50 MCG: 50 INJECTION INTRAMUSCULAR; INTRAVENOUS at 09:53

## 2019-05-06 RX ADMIN — PROPOFOL 150 MG: 10 INJECTION, EMULSION INTRAVENOUS at 09:38

## 2019-05-06 RX ADMIN — LIDOCAINE HYDROCHLORIDE 30 MG: 20 INJECTION, SOLUTION INFILTRATION; PERINEURAL at 09:38

## 2019-05-06 RX ADMIN — EPHEDRINE SULFATE 10 MG: 50 INJECTION INTRAMUSCULAR; INTRAVENOUS; SUBCUTANEOUS at 09:47

## 2019-05-06 RX ADMIN — HYDROCODONE BITARTRATE AND ACETAMINOPHEN 1 TABLET: 5; 325 TABLET ORAL at 19:45

## 2019-05-06 RX ADMIN — ROCURONIUM BROMIDE 35 MG: 10 INJECTION INTRAVENOUS at 09:38

## 2019-05-06 RX ADMIN — CEFAZOLIN SODIUM 2 G: 2 INJECTION, SOLUTION INTRAVENOUS at 18:25

## 2019-05-06 RX ADMIN — PROMETHAZINE HYDROCHLORIDE 12.5 MG: 12.5 TABLET ORAL at 19:45

## 2019-05-06 RX ADMIN — SODIUM CHLORIDE, POTASSIUM CHLORIDE, SODIUM LACTATE AND CALCIUM CHLORIDE 9 ML/HR: 600; 310; 30; 20 INJECTION, SOLUTION INTRAVENOUS at 08:33

## 2019-05-06 RX ADMIN — NEOSTIGMINE METHYLSULFATE 2.5 MG: 1 INJECTION INTRAMUSCULAR; INTRAVENOUS; SUBCUTANEOUS at 11:12

## 2019-05-06 RX ADMIN — PHENYLEPHRINE HYDROCHLORIDE 100 MCG: 10 INJECTION INTRAVENOUS at 10:30

## 2019-05-06 RX ADMIN — FENTANYL CITRATE 50 MCG: 50 INJECTION INTRAMUSCULAR; INTRAVENOUS at 09:55

## 2019-05-06 RX ADMIN — FAMOTIDINE 20 MG: 10 INJECTION INTRAVENOUS at 08:33

## 2019-05-06 RX ADMIN — CEFAZOLIN SODIUM 1 G: 2 INJECTION, SOLUTION INTRAVENOUS at 11:03

## 2019-05-06 RX ADMIN — HYDROMORPHONE HYDROCHLORIDE 0.5 MG: 1 INJECTION, SOLUTION INTRAMUSCULAR; INTRAVENOUS; SUBCUTANEOUS at 12:09

## 2019-05-06 RX ADMIN — PHENYLEPHRINE HYDROCHLORIDE 100 MCG: 10 INJECTION INTRAVENOUS at 11:07

## 2019-05-06 RX ADMIN — DEXAMETHASONE SODIUM PHOSPHATE 8 MG: 10 INJECTION INTRAMUSCULAR; INTRAVENOUS at 09:50

## 2019-05-06 RX ADMIN — LIDOCAINE HYDROCHLORIDE 1 EACH: 40 SOLUTION TOPICAL at 09:41

## 2019-05-06 RX ADMIN — LEVOTHYROXINE SODIUM 150 MCG: 150 TABLET ORAL at 15:42

## 2019-05-06 RX ADMIN — POTASSIUM CHLORIDE AND SODIUM CHLORIDE 100 ML/HR: 900; 150 INJECTION, SOLUTION INTRAVENOUS at 15:43

## 2019-05-06 RX ADMIN — METHADONE HYDROCHLORIDE 10 MG: 10 TABLET ORAL at 09:12

## 2019-05-06 RX ADMIN — SODIUM CHLORIDE, POTASSIUM CHLORIDE, SODIUM LACTATE AND CALCIUM CHLORIDE: 600; 310; 30; 20 INJECTION, SOLUTION INTRAVENOUS at 11:15

## 2019-05-06 RX ADMIN — TERAZOSIN HYDROCHLORIDE 5 MG: 5 CAPSULE ORAL at 19:46

## 2019-05-06 RX ADMIN — GLYCOPYRROLATE 0.4 MG: 0.2 INJECTION INTRAMUSCULAR; INTRAVENOUS at 11:12

## 2019-05-06 RX ADMIN — GABAPENTIN 600 MG: 300 CAPSULE ORAL at 09:12

## 2019-05-06 RX ADMIN — CEFAZOLIN SODIUM 2 G: 2 INJECTION, SOLUTION INTRAVENOUS at 09:45

## 2019-05-06 NOTE — BRIEF OP NOTE
CERVICAL DISCECTOMY ANTERIOR FUSION WITH INSTRUMENTATION  Progress Note    John Jones  5/6/2019    Pre-op Diagnosis:   Cervical radiculopathy [M54.12]       Post-Op Diagnosis Codes:     * Cervical radiculopathy [M54.12]    Procedure/CPT® Codes:      Procedure(s):  C5-C7 anterior cervical discectomy, fusion and instrumentation    Surgeon(s):  Roderick Phoenix MD    Anesthesia: General    Staff:   Circulator: Olena Keller RN; Bhavna Vang RN  Scrub Person: Ayo Escobar  Vendor Representative: Laurent Mays  Assistant: Lorin Snyder CSA    Estimated Blood Loss: 100ml    Urine Voided: * No values recorded between 5/6/2019  9:30 AM and 5/6/2019 11:10 AM *    Specimens:                None          Drains:   Closed/Suction Drain 1 Anterior Neck Bulb 10 Fr. (Active)       Findings: stenosis    Complications: none      Roderick Phoenix MD     Date: 5/6/2019  Time: 11:10 AM

## 2019-05-06 NOTE — OP NOTE
Preoperative diagnosis: Cervical stenosis C5-6 and C6-7 with cervical radiculopathy    Postoperative diagnosis: Same    Procedure performed: Anterior cervical discectomy C5-6 and C6-7 with anterior cervical fusion and instrumentation    Surgeon: Roderick Phoenix M.D.    Assistant: Lorin Snyder CFA who was instrumental in helping with hemostasis, visualization of neural structures, and retraction of neural structures.    Indications for the procedure: This patient was having severe symptoms in the neck and arms.   Imaging showed significant neural compressionin the cervical spine at C5-6 and C6-7.    Operative summary: After induction of general anesthesia with intravenous agents patient was intubated and placed on the operating table in the supine position.  The head was hyperextended on donut roll and a roll of sheets was not placed under the shoulders.  The shoulders were taped down being careful not to stretch the brachial plexus too much.  All peripheral points of entrapment and pressure were padded and secured.   The neck was prepped with Chloraprep.    An incision was made in the right lower portion of the neck.  This was carried down to the platysma.  The platysma was opened in the direction of its fibers.  Dissection was carried down through the middle cervical fascia to the anterior aspect of the cervical spine.  A needle was placed in the first available disc space.  This did prove to be C5-6.  Attention was turned to that level in the next 1 down.  The longus colli muscles were elevated off both sides of the anterior cervical spine and then the Cloward retractors were locked under the longus colli muscles and used to hold the esophagus, trachea, and recurrent laryngeal bundle medially and the carotid bundle and its contents laterally.  The disc space at the C6-7 level was incised and disc material was removed with the 0 and 3-0 up-biting and straight curettes. The pituitary was also used to remove disc  material.  Following this the posterior lip of the vertebral body was drilled off in combination with the uncovertebral joints on each side.    The posterior longitudinal ligament was then opened and removed from foramen to foramen completely decompressing the spinal cord and the nerve roots.  Once the nerve roots were visualized in each neural foramen and found to be completely decompressed bleeding was controlled with a combination of the bipolar cautery, FloSeal, and thrombin and Gelfoam.  Once the bleeding was stopped the disc space was sized and a 8 mm Vertastack anatomic PEEK cage was placed into the disc space.  It was filled first with Roosevelt putty. The compression at this level was primarily due to stenosis.    Following this attention was turned to the C5-6 level.  At that level the same thing was done.  The disc material was removed, the posterior lip of the vertebral body was removed as well as the uncovertebral joints.  The posterior longitudinal ligament was then opened and removed.  It did require some manipulation of the left C6 nerve root in order to completely remove the uncovertebral joint on the left.  Bleeding was again controlled with the bipolar cautery FloSeal, and thrombin and Gelfoam.  Finally another Vertastack anatomic peek cage was placed at this level along with Roosevelt putty.  This cage measured 7 mm.  At this level the compression was mostly due to stenosis.    A 45 mm Atlantis plate was then attached to the front of the cervical spine using 6 14 mm screws.    The retractors were removed and final x-rays taken. Bleeding was controlled with the bipolar cautery and a drain was placed in the anterior cervical space and tunneled subcutaneously. It was then sewn into place and the incision was closed in layers, dressed and the patient was taken to the recovery room in stable condition.  Sponge instrument and needle counts were correct at the end of the procedure.

## 2019-05-06 NOTE — ANESTHESIA PROCEDURE NOTES
Airway  Urgency: elective    Airway not difficult    General Information and Staff    Patient location during procedure: OR  Anesthesiologist: Jay Arcos MD  CRNA: Destinee Louis CRNA    Indications and Patient Condition  Indications for airway management: airway protection    Preoxygenated: yes  Mask difficulty assessment: 1 - vent by mask    Final Airway Details  Final airway type: endotracheal airway      Successful airway: ETT  Cuffed: yes   Successful intubation technique: direct laryngoscopy  Facilitating devices/methods: intubating stylet  Endotracheal tube insertion site: oral  Blade: Mcclain  Blade size: 2  ETT size (mm): 7.5  Cormack-Lehane Classification: grade I - full view of glottis  Placement verified by: chest auscultation and capnometry   Cuff volume (mL): 7  Measured from: teeth  ETT to teeth (cm): 21  Number of attempts at approach: 1

## 2019-05-06 NOTE — ADDENDUM NOTE
Addendum  created 05/06/19 1503 by Jay Arcos MD    Actions taken from a BestPractice Advisory, Intraprocedure Attestations filed

## 2019-05-06 NOTE — PERIOPERATIVE NURSING NOTE
Norco and Keflex prescriptions faxed to retail pharmacy and [facility]. Paper copies given to pt's wife Petrona along with postop/discharge instructions from Dr. Phoenix.

## 2019-05-06 NOTE — ANESTHESIA PREPROCEDURE EVALUATION
Anesthesia Evaluation     Patient summary reviewed and Nursing notes reviewed   no history of anesthetic complications:  NPO Solid Status: > 8 hours  NPO Liquid Status: > 2 hours           Airway   Mallampati: II  TM distance: >3 FB  Neck ROM: full  Dental - normal exam     Pulmonary - negative pulmonary ROS and normal exam   Cardiovascular - normal exam  Exercise tolerance: good (4-7 METS)    ECG reviewed  Rhythm: regular    (+) hypertension,     ROS comment: Sinus rhythm  Left axis deviation  Nonspecific T abnormalities, inferior leads  Borderline ST elevation, anterior leads- non-diagnostic     Neuro/Psych  (+) numbness,     GI/Hepatic/Renal/Endo    (+)   hypothyroidism,     Musculoskeletal     (+) neck pain,   Abdominal  - normal exam    Bowel sounds: normal.   Substance History - negative use     OB/GYN negative ob/gyn ROS         Other   (+) arthritis                     Anesthesia Plan    ASA 2     general   (Preop methadone and gabapentin ordered )  intravenous induction   Anesthetic plan, all risks, benefits, and alternatives have been provided, discussed and informed consent has been obtained with: patient.    Plan discussed with CRNA and attending.

## 2019-05-06 NOTE — ANESTHESIA POSTPROCEDURE EVALUATION
Patient: John Jones    Procedure Summary     Date:  05/06/19 Room / Location:  [facility] OR  / [facility] MAIN OR    Anesthesia Start:  0934 Anesthesia Stop:  1129    Procedure:  C5-C7 anterior cervical discectomy, fusion and instrumentation (N/A Spine Cervical) Diagnosis:       Cervical radiculopathy      (Cervical radiculopathy [M54.12])    Surgeon:  Roderick Phoenix MD Provider:  Jay Arcos MD    Anesthesia Type:  general ASA Status:  2          Anesthesia Type: general  Last vitals  BP   137/90 (05/06/19 1300)   Temp   36.9 °C (98.4 °F) (05/06/19 1245)   Pulse   88 (05/06/19 1300)   Resp   16 (05/06/19 1300)     SpO2   98 % (05/06/19 1300)     Post Anesthesia Care and Evaluation    Patient location during evaluation: bedside  Pain management: adequate  Airway patency: patent  Anesthetic complications: No anesthetic complications    Cardiovascular status: acceptable  Respiratory status: acceptable  Hydration status: acceptable

## 2019-05-07 ENCOUNTER — APPOINTMENT (OUTPATIENT)
Dept: GENERAL RADIOLOGY | Facility: HOSPITAL | Age: 64
End: 2019-05-07

## 2019-05-07 LAB
BASOPHILS # BLD AUTO: 0.02 10*3/MM3 (ref 0–0.2)
BASOPHILS NFR BLD AUTO: 0.2 % (ref 0–1.5)
DEPRECATED RDW RBC AUTO: 47.3 FL (ref 37–54)
EOSINOPHIL # BLD AUTO: 0.01 10*3/MM3 (ref 0–0.4)
EOSINOPHIL NFR BLD AUTO: 0.1 % (ref 0.3–6.2)
ERYTHROCYTE [DISTWIDTH] IN BLOOD BY AUTOMATED COUNT: 13.8 % (ref 12.3–15.4)
HCT VFR BLD AUTO: 35.9 % (ref 37.5–51)
HGB BLD-MCNC: 11.8 G/DL (ref 13–17.7)
IMM GRANULOCYTES # BLD AUTO: 0.05 10*3/MM3 (ref 0–0.05)
IMM GRANULOCYTES NFR BLD AUTO: 0.5 % (ref 0–0.5)
LYMPHOCYTES # BLD AUTO: 0.8 10*3/MM3 (ref 0.7–3.1)
LYMPHOCYTES NFR BLD AUTO: 7.7 % (ref 19.6–45.3)
MCH RBC QN AUTO: 30.8 PG (ref 26.6–33)
MCHC RBC AUTO-ENTMCNC: 32.9 G/DL (ref 31.5–35.7)
MCV RBC AUTO: 93.7 FL (ref 79–97)
MONOCYTES # BLD AUTO: 0.98 10*3/MM3 (ref 0.1–0.9)
MONOCYTES NFR BLD AUTO: 9.5 % (ref 5–12)
NEUTROPHILS # BLD AUTO: 8.51 10*3/MM3 (ref 1.7–7)
NEUTROPHILS NFR BLD AUTO: 82 % (ref 42.7–76)
NRBC BLD AUTO-RTO: 0 /100 WBC (ref 0–0.2)
PLATELET # BLD AUTO: 253 10*3/MM3 (ref 140–450)
PMV BLD AUTO: 9.7 FL (ref 6–12)
RBC # BLD AUTO: 3.83 10*6/MM3 (ref 4.14–5.8)
WBC NRBC COR # BLD: 10.37 10*3/MM3 (ref 3.4–10.8)

## 2019-05-07 PROCEDURE — 85025 COMPLETE CBC W/AUTO DIFF WBC: CPT | Performed by: NEUROLOGICAL SURGERY

## 2019-05-07 PROCEDURE — 25010000003 HYDROCORTISONE SOD SUCCINATE PF 250 MG RECONSTITUTED SOLUTION 1 EACH VIAL: Performed by: PHYSICIAN ASSISTANT

## 2019-05-07 PROCEDURE — 25810000003 SODIUM CHLORIDE 0.9 % WITH KCL 20 MEQ 20-0.9 MEQ/L-% SOLUTION: Performed by: NEUROLOGICAL SURGERY

## 2019-05-07 PROCEDURE — 25010000003 CEFAZOLIN IN DEXTROSE 2-4 GM/100ML-% SOLUTION: Performed by: NEUROLOGICAL SURGERY

## 2019-05-07 PROCEDURE — 99024 POSTOP FOLLOW-UP VISIT: CPT | Performed by: PHYSICIAN ASSISTANT

## 2019-05-07 PROCEDURE — 72040 X-RAY EXAM NECK SPINE 2-3 VW: CPT

## 2019-05-07 RX ORDER — FAMOTIDINE 10 MG/ML
20 INJECTION, SOLUTION INTRAVENOUS EVERY 12 HOURS SCHEDULED
Status: DISCONTINUED | OUTPATIENT
Start: 2019-05-07 | End: 2019-05-08 | Stop reason: HOSPADM

## 2019-05-07 RX ADMIN — FAMOTIDINE 20 MG: 10 INJECTION INTRAVENOUS at 20:05

## 2019-05-07 RX ADMIN — CEFAZOLIN SODIUM 2 G: 2 INJECTION, SOLUTION INTRAVENOUS at 03:17

## 2019-05-07 RX ADMIN — POTASSIUM CHLORIDE AND SODIUM CHLORIDE 100 ML/HR: 900; 150 INJECTION, SOLUTION INTRAVENOUS at 01:23

## 2019-05-07 RX ADMIN — TERAZOSIN HYDROCHLORIDE 5 MG: 5 CAPSULE ORAL at 20:05

## 2019-05-07 RX ADMIN — POTASSIUM CHLORIDE AND SODIUM CHLORIDE 100 ML/HR: 900; 150 INJECTION, SOLUTION INTRAVENOUS at 13:30

## 2019-05-07 RX ADMIN — CEFAZOLIN SODIUM 2 G: 2 INJECTION, SOLUTION INTRAVENOUS at 19:54

## 2019-05-07 RX ADMIN — LEVOTHYROXINE SODIUM 150 MCG: 150 TABLET ORAL at 06:03

## 2019-05-07 RX ADMIN — AMLODIPINE BESYLATE 5 MG: 5 TABLET ORAL at 10:33

## 2019-05-07 RX ADMIN — SODIUM CHLORIDE 250 MG: 9 INJECTION, SOLUTION INTRAVENOUS at 18:08

## 2019-05-07 RX ADMIN — SODIUM CHLORIDE 250 MG: 9 INJECTION, SOLUTION INTRAVENOUS at 13:30

## 2019-05-07 RX ADMIN — SODIUM CHLORIDE, PRESERVATIVE FREE 3 ML: 5 INJECTION INTRAVENOUS at 20:05

## 2019-05-07 RX ADMIN — CEFAZOLIN SODIUM 2 G: 2 INJECTION, SOLUTION INTRAVENOUS at 10:33

## 2019-05-07 RX ADMIN — FAMOTIDINE 20 MG: 10 INJECTION INTRAVENOUS at 13:30

## 2019-05-07 NOTE — PROGRESS NOTES
Discharge Planning Assessment  [facility]     Patient Name: John Jones  MRN: [id number]  Today's Date: 5/7/2019    Admit Date: 5/6/2019    Discharge Needs Assessment     Row Name 05/07/19 1052       Living Environment    Lives With  spouse    Name(s) of Who Lives With Patient  Petrona Jones [phone]    Family Caregiver if Needed  spouse    Family Caregiver Names  Petrona Jones [phone]    Quality of Family Relationships  supportive;involved;helpful    Able to Return to Prior Arrangements  yes       Resource/Environmental Concerns    Resource/Environmental Concerns  none    Transportation Concerns  car, none       Transition Planning    Patient/Family Anticipates Transition to  home with family    Patient/Family Anticipated Services at Transition  none    Transportation Anticipated  family or friend will provide       Discharge Needs Assessment    Readmission Within the Last 30 Days  no previous admission in last 30 days    Concerns to be Addressed  no discharge needs identified;denies needs/concerns at this time    Equipment Currently Used at Home  none    Equipment Needed After Discharge  none    Offered/Gave Vendor List  no        Discharge Plan     Row Name 05/07/19 1058       Plan    Plan  Home    Plan Comments  Spoke with pt for screening of DCP/needs.  Pt reports that he lives at home with his wife and plans to return home with no anticipated needs. Pt stated that he has been up in room and feels he will be fine to D/C home. Pt did confirm facessheet and pharmacy info as coorect.  Pt has chosen to use [facility] upon D/c.  CCP will follow to assist if any DC needs do arise.          Destination      No service coordination in this encounter.      Durable Medical Equipment      No service coordination in this encounter.      Dialysis/Infusion      No service coordination in this encounter.      Home Medical Care      No service coordination in this encounter.      Therapy      No service coordination in  this encounter.      Community Resources      No service coordination in this encounter.          Demographic Summary     Row Name 05/07/19 1050       General Information    Admission Type  observation    Arrived From  PACU/recovery room    Referral Source  admission list    Preferred Language  English     Used During This Interaction  no        Functional Status     Row Name 05/07/19 1050       Functional Status    Usual Activity Tolerance  good    Current Activity Tolerance  moderate       Functional Status, IADL    Medications  independent    Meal Preparation  independent    Housekeeping  independent    Laundry  independent    Shopping  independent       Mental Status Summary    Recent Changes in Mental Status/Cognitive Functioning  no changes        Psychosocial    No documentation.       Abuse/Neglect    No documentation.       Legal    No documentation.       Substance Abuse    No documentation.       Patient Forms    No documentation.           ZUNILDA Higgins

## 2019-05-07 NOTE — PLAN OF CARE
Problem: Patient Care Overview  Goal: Plan of Care Review  Outcome: Ongoing (interventions implemented as appropriate)   05/07/19 1374   Coping/Psychosocial   Plan of Care Reviewed With patient   Plan of Care Review   Progress improving   OTHER   Outcome Summary No c/o pain. ambulates at [facility]. started on steroid for left shoulder weakness. VSS will monitor     Goal: Individualization and Mutuality  Outcome: Ongoing (interventions implemented as appropriate)    Goal: Discharge Needs Assessment  Outcome: Ongoing (interventions implemented as appropriate)    Goal: Interprofessional Rounds/Family Conf  Outcome: Ongoing (interventions implemented as appropriate)      Problem: Laminectomy/Foraminotomy/Discectomy (Adult)  Goal: Signs and Symptoms of Listed Potential Problems Will be Absent, Minimized or Managed (Laminectomy/Foraminotomy/Discectomy)  Outcome: Ongoing (interventions implemented as appropriate)      Problem: Pain, Acute (Adult)  Goal: Identify Related Risk Factors and Signs and Symptoms  Outcome: Ongoing (interventions implemented as appropriate)    Goal: Acceptable Pain Control/Comfort Level  Outcome: Ongoing (interventions implemented as appropriate)      Problem: Infection, Risk/Actual (Adult)  Goal: Identify Related Risk Factors and Signs and Symptoms  Outcome: Ongoing (interventions implemented as appropriate)    Goal: Infection Prevention/Resolution  Outcome: Ongoing (interventions implemented as appropriate)

## 2019-05-07 NOTE — PLAN OF CARE
Problem: Patient Care Overview  Goal: Plan of Care Review  Outcome: Ongoing (interventions implemented as appropriate)  Patient s/p ACDF with interbody fusion at C5-C6 on 5/6/19. Patient has denied pain since arriving to [facility]; patient did take a hydrocodone 5mg at HS to promote rest and decrease risk of discomfort while sleeping; patient with 1 episode of N/V and received phenergan; reports phenergan was effective. Patient up with assist x1; Left shoulder with decreased ROM at this time; surgical dressing intact; no drainage; patient wearing soft collar; swallowing without difficulty at this time. JENIFER drain with bloody drainage. No acute distress noted or reported. Alert and oriented x4; no other complaints throughout the night. RN to continue to monitor.  Goal: Discharge Needs Assessment  Outcome: Ongoing (interventions implemented as appropriate)    Goal: Interprofessional Rounds/Family Conf  Outcome: Ongoing (interventions implemented as appropriate)      Problem: Laminectomy/Foraminotomy/Discectomy (Adult)  Goal: Signs and Symptoms of Listed Potential Problems Will be Absent, Minimized or Managed (Laminectomy/Foraminotomy/Discectomy)  Outcome: Ongoing (interventions implemented as appropriate)    Goal: Anesthesia/Sedation Recovery  Outcome: Outcome(s) achieved Date Met: 05/07/19      Problem: Pain, Acute (Adult)  Goal: Identify Related Risk Factors and Signs and Symptoms  Outcome: Ongoing (interventions implemented as appropriate)    Goal: Acceptable Pain Control/Comfort Level  Outcome: Ongoing (interventions implemented as appropriate)      Problem: Infection, Risk/Actual (Adult)  Goal: Identify Related Risk Factors and Signs and Symptoms  Outcome: Ongoing (interventions implemented as appropriate)    Goal: Infection Prevention/Resolution  Outcome: Ongoing (interventions implemented as appropriate)

## 2019-05-07 NOTE — PROGRESS NOTES
"Doing well in regards to pain. C/o expected neck soreness.  Does also report new L arm weakness since surgery, already better this am than it was immediately postop. He states that when he woke up from surgery yesterday he could barely flex or extend the L arm.  Now he has localized weakness in the L deltoid and slightly L bicep only. Walking well, voiding well. No swallowing difficulties.     XR done this am look great. Hardware position and alignment ok.       Blood pressure 119/72, pulse 78, temperature 98.5 °F (36.9 °C), temperature source Oral, resp. rate 16, height 180.3 cm (71\"), weight 74.5 kg (164 lb 3.9 oz), SpO2 96 %.      AA,Ox3  Incision ok  Neck soft and flat  JENIFER:  60cc last shift, about 20cc now, bloddy  L deltoid weakness 3/5, L bicep weakness 5-/5. Otherwise intact.       ..  Results from last 7 days   Lab Units 05/07/19  0537   WBC 10*3/mm3 10.37   HEMOGLOBIN g/dL 11.8*   HEMATOCRIT % 35.9*   PLATELETS 10*3/mm3 253         POD#1 s/p ACDF C5-7 for radiculopathy  Postop neuritis with L arm weakness that has already improved significantly per pt since yesterday-explained to patient that this is secondary to nerve manipulation during surgery, will improve with time, will start steroids to help   Cont to mobilize  Keep drain given current outpt  Plan on home in am  CBC in am.   "

## 2019-05-08 VITALS
HEART RATE: 85 BPM | RESPIRATION RATE: 18 BRPM | BODY MASS INDEX: 22.99 KG/M2 | TEMPERATURE: 97.6 F | HEIGHT: 71 IN | DIASTOLIC BLOOD PRESSURE: 84 MMHG | WEIGHT: 164.24 LBS | OXYGEN SATURATION: 98 % | SYSTOLIC BLOOD PRESSURE: 157 MMHG

## 2019-05-08 PROBLEM — M62.81 MUSCLE WEAKNESS OF LEFT ARM: Status: RESOLVED | Noted: 2019-04-10 | Resolved: 2019-05-08

## 2019-05-08 LAB
BASOPHILS # BLD AUTO: 0.01 10*3/MM3 (ref 0–0.2)
BASOPHILS NFR BLD AUTO: 0.1 % (ref 0–1.5)
DEPRECATED RDW RBC AUTO: 46.8 FL (ref 37–54)
EOSINOPHIL # BLD AUTO: 0 10*3/MM3 (ref 0–0.4)
EOSINOPHIL NFR BLD AUTO: 0 % (ref 0.3–6.2)
ERYTHROCYTE [DISTWIDTH] IN BLOOD BY AUTOMATED COUNT: 13.6 % (ref 12.3–15.4)
HCT VFR BLD AUTO: 37.8 % (ref 37.5–51)
HGB BLD-MCNC: 12.4 G/DL (ref 13–17.7)
IMM GRANULOCYTES # BLD AUTO: 0.04 10*3/MM3 (ref 0–0.05)
IMM GRANULOCYTES NFR BLD AUTO: 0.3 % (ref 0–0.5)
LYMPHOCYTES # BLD AUTO: 0.75 10*3/MM3 (ref 0.7–3.1)
LYMPHOCYTES NFR BLD AUTO: 6.1 % (ref 19.6–45.3)
MCH RBC QN AUTO: 30.8 PG (ref 26.6–33)
MCHC RBC AUTO-ENTMCNC: 32.8 G/DL (ref 31.5–35.7)
MCV RBC AUTO: 94 FL (ref 79–97)
MONOCYTES # BLD AUTO: 0.5 10*3/MM3 (ref 0.1–0.9)
MONOCYTES NFR BLD AUTO: 4 % (ref 5–12)
NEUTROPHILS # BLD AUTO: 11.09 10*3/MM3 (ref 1.7–7)
NEUTROPHILS NFR BLD AUTO: 89.5 % (ref 42.7–76)
NRBC BLD AUTO-RTO: 0 /100 WBC (ref 0–0.2)
PLATELET # BLD AUTO: 252 10*3/MM3 (ref 140–450)
PMV BLD AUTO: 10 FL (ref 6–12)
RBC # BLD AUTO: 4.02 10*6/MM3 (ref 4.14–5.8)
WBC NRBC COR # BLD: 12.39 10*3/MM3 (ref 3.4–10.8)

## 2019-05-08 PROCEDURE — 85025 COMPLETE CBC W/AUTO DIFF WBC: CPT | Performed by: PHYSICIAN ASSISTANT

## 2019-05-08 PROCEDURE — 25810000003 SODIUM CHLORIDE 0.9 % WITH KCL 20 MEQ 20-0.9 MEQ/L-% SOLUTION: Performed by: NEUROLOGICAL SURGERY

## 2019-05-08 PROCEDURE — 25010000003 HYDROCORTISONE SOD SUCCINATE PF 250 MG RECONSTITUTED SOLUTION 1 EACH VIAL: Performed by: PHYSICIAN ASSISTANT

## 2019-05-08 PROCEDURE — 25010000003 CEFAZOLIN IN DEXTROSE 2-4 GM/100ML-% SOLUTION: Performed by: NEUROLOGICAL SURGERY

## 2019-05-08 RX ORDER — DEXAMETHASONE 1.5 MG/1
TABLET ORAL
Qty: 51 TABLET | Refills: 0 | Status: SHIPPED | OUTPATIENT
Start: 2019-05-08 | End: 2019-05-23

## 2019-05-08 RX ORDER — HYDROCODONE BITARTRATE AND ACETAMINOPHEN 5; 325 MG/1; MG/1
1 TABLET ORAL EVERY 6 HOURS PRN
Qty: 35 TABLET | Refills: 0 | Status: SHIPPED | OUTPATIENT
Start: 2019-05-08 | End: 2019-05-16

## 2019-05-08 RX ORDER — CEPHALEXIN 500 MG/1
500 CAPSULE ORAL EVERY 6 HOURS
Qty: 20 CAPSULE | Refills: 0 | Status: SHIPPED | OUTPATIENT
Start: 2019-05-08 | End: 2019-05-23

## 2019-05-08 RX ADMIN — SODIUM CHLORIDE 250 MG: 9 INJECTION, SOLUTION INTRAVENOUS at 01:31

## 2019-05-08 RX ADMIN — CEFAZOLIN SODIUM 2 G: 2 INJECTION, SOLUTION INTRAVENOUS at 10:26

## 2019-05-08 RX ADMIN — FAMOTIDINE 20 MG: 10 INJECTION INTRAVENOUS at 10:26

## 2019-05-08 RX ADMIN — POTASSIUM CHLORIDE AND SODIUM CHLORIDE 100 ML/HR: 900; 150 INJECTION, SOLUTION INTRAVENOUS at 01:31

## 2019-05-08 RX ADMIN — AMLODIPINE BESYLATE 5 MG: 5 TABLET ORAL at 10:26

## 2019-05-08 RX ADMIN — CEFAZOLIN SODIUM 2 G: 2 INJECTION, SOLUTION INTRAVENOUS at 03:03

## 2019-05-08 RX ADMIN — SODIUM CHLORIDE, PRESERVATIVE FREE 3 ML: 5 INJECTION INTRAVENOUS at 10:27

## 2019-05-08 RX ADMIN — SODIUM CHLORIDE 250 MG: 9 INJECTION, SOLUTION INTRAVENOUS at 06:17

## 2019-05-08 RX ADMIN — LEVOTHYROXINE SODIUM 150 MCG: 150 TABLET ORAL at 06:11

## 2019-05-08 RX ADMIN — SODIUM CHLORIDE 250 MG: 9 INJECTION, SOLUTION INTRAVENOUS at 13:33

## 2019-05-08 NOTE — PROGRESS NOTES
Case Management Discharge Note    Final Note: Home with spouse    Destination      No service has been selected for the patient.      Durable Medical Equipment      No service has been selected for the patient.      Dialysis/Infusion      No service has been selected for the patient.      Home Medical Care      No service has been selected for the patient.      Therapy      No service has been selected for the patient.      Community Resources      No service has been selected for the patient.        Transportation Services  Private: Car    Final Discharge Disposition Code: 01 - home or self-care

## 2019-05-08 NOTE — PLAN OF CARE
Problem: Patient Care Overview  Goal: Plan of Care Review  Patient without c/o pain or discomfort through the night; patient ambulating to the bathroom and is able to swallow without difficulty. Patient is hoping to go home today, 5/8/19.     Problem: Laminectomy/Foraminotomy/Discectomy (Adult)  Goal: Signs and Symptoms of Listed Potential Problems Will be Absent, Minimized or Managed (Laminectomy/Foraminotomy/Discectomy)  Outcome: Ongoing (interventions implemented as appropriate)      Problem: Pain, Acute (Adult)  Goal: Identify Related Risk Factors and Signs and Symptoms  Outcome: Ongoing (interventions implemented as appropriate)    Goal: Acceptable Pain Control/Comfort Level  Outcome: Ongoing (interventions implemented as appropriate)      Problem: Infection, Risk/Actual (Adult)  Goal: Identify Related Risk Factors and Signs and Symptoms  Outcome: Ongoing (interventions implemented as appropriate)    Goal: Infection Prevention/Resolution  Outcome: Ongoing (interventions implemented as appropriate)

## 2019-05-08 NOTE — DISCHARGE SUMMARY
John Jones  1955    Patient Care Team:  Diego Benavidez MD as PCP - General (Family Medicine)    Date of Admit: 5/6/2019    Date of Discharge:  5/8/2019    Discharge Diagnosis:  Cervical radiculopathy    Muscle left arm weakness      Procedures Performed  Procedure(s):  C5-C7 anterior cervical discectomy, fusion and instrumentation       Complications: None    Consultants:   Consults     No orders found from 4/7/2019 to 5/7/2019.          Condition on Discharge: stable    Discharge disposition: home      Brief HPI: This is a very pleasant 63-year-old male with a history of weakness and numbness in his left arm.  Work-up revealed severe canal stenosis C5-6 with cord compression and a large left C6-7 disc herniation.  Due to the severity of the stenosis and cord compression, the patient was brought to the operating room electively for the above-stated surgical procedure.  Please admission H&P for further details.      Hospital Course: The patient has been experiencing worsening weakness in the left arm since surgery.  Dr. Phoenix had explained to the patient's wife postoperatively that he expected this would happen given the amount of manipulation required to free up the nerve during surgery.  The patient was started on IV Solu-Cortef yesterday.  He has noticed some mild improvement in the last 24 hours.  He is still not able to raise his left arm above chest level.  He is having 3-/5 left deltoid weakness and 4-/5 left bicep weakness.  No long track signs.  He is swallowing without difficulty.  His voice quality is normal.  The anterior cervical incision is well approximated.  There is no redness, swelling, or drainage.  The JENIFER drain began to show evidence of leakage around the insertion site and was therefore discontinued.  The patient is swallowing, tolerating a diet and voiding without difficulty.  He is ambulating to and from the bathroom with assistance.  He is wearing a soft cervical collar.  He has  remained afebrile and his vital signs have been stable.  The patient feels ready for discharge home.  Both he and Dr. Phoenix are in agreement with the plan.      Temp:  [97.1 °F (36.2 °C)-98.8 °F (37.1 °C)] 97.6 °F (36.4 °C)  Heart Rate:  [70-99] 85  Resp:  [16-18] 18  BP: (130-157)/() 157/84    Current labs:  Lab Results (last 24 hours)     Procedure Component Value Units Date/Time    CBC & Differential [[id number]] Collected:  05/08/19 0451    Specimen:  Blood Updated:  05/08/19 0552    Narrative:       The following orders were created for panel order CBC & Differential.  Procedure                               Abnormality         Status                     ---------                               -----------         ------                     CBC Auto Differential[[id number]]        Abnormal            Final result                 Please view results for these tests on the individual orders.    CBC Auto Differential [[id number]]  (Abnormal) Collected:  05/08/19 0451    Specimen:  Blood Updated:  05/08/19 0552     WBC 12.39 10*3/mm3      RBC 4.02 10*6/mm3      Hemoglobin 12.4 g/dL      Hematocrit 37.8 %      MCV 94.0 fL      MCH 30.8 pg      MCHC 32.8 g/dL      RDW 13.6 %      RDW-SD 46.8 fl      MPV 10.0 fL      Platelets 252 10*3/mm3      Neutrophil % 89.5 %      Lymphocyte % 6.1 %      Monocyte % 4.0 %      Eosinophil % 0.0 %      Basophil % 0.1 %      Immature Grans % 0.3 %      Neutrophils, Absolute 11.09 10*3/mm3      Lymphocytes, Absolute 0.75 10*3/mm3      Monocytes, Absolute 0.50 10*3/mm3      Eosinophils, Absolute 0.00 10*3/mm3      Basophils, Absolute 0.01 10*3/mm3      Immature Grans, Absolute 0.04 10*3/mm3      nRBC 0.0 /100 WBC         Discharge Medications  Cristi has been reviewed and narcotic consent is on file in the patient's chart.     Your medication list      START taking these medications      Instructions Last Dose Given Next Dose Due   cephalexin 500 MG capsule  Commonly known as:   KEFLEX      Take 1 capsule by mouth Every 6 (Six) Hours until gone.       dexamethasone 1.5 MG tablet  Commonly known as:  DECADRON      3 tab am,3 tab pm x 4d; 3tab am,2 tab pm x 1 d; 2 tab am,2 tab pm x 3d;2 tab am,1 tab pm x 1d;1 tab am,1 tab pm x 3d;1 tab am x 1d then stop       HYDROcodone-acetaminophen 5-325 MG per tablet  Commonly known as:  NORCO      Take 1 tablet by mouth Every 6 (Six) Hours As Needed for Moderate Pain  for up to 8 days.          CONTINUE taking these medications      Instructions Last Dose Given Next Dose Due   amLODIPine 5 MG tablet  Commonly known as:  NORVASC      Take 5 mg by mouth Daily.       aspirin 81 MG tablet      Take 81 mg by mouth Daily. PT TO HOLD FOR SURGERY       doxazosin 4 MG tablet  Commonly known as:  CARDURA      Take 4 mg by mouth Every Night.       levothyroxine 150 MCG tablet  Commonly known as:  SYNTHROID, LEVOTHROID      Take 150 mcg by mouth Daily.          STOP taking these medications    meloxicam 15 MG tablet  Commonly known as:  MOBIC              Where to Get Your Medications      These medications were sent to [facility]  [address] [address]    Hours:  7:00AM-6PM Mon-Fri Phone:  [phone]   · cephalexin 500 MG capsule  · dexamethasone 1.5 MG tablet  · HYDROcodone-acetaminophen 5-325 MG per tablet         Discharge Diet:   Diet Instructions     Diet:      Diet Texture / Consistency:  Soft Texture    Select Texture:  Chopped    Fluid Consistency:  Thin        Diet Order   Procedures   • Diet Soft Texture; Chopped; Thin       Activity at Discharge:   Activity Instructions     Discharge Activity      [facility]  [address], [address]  [place] [id number]  Phone:  [phone]  Fax:  [phone]      Roderick Phoenix M.D., F.A.C.S.    INSTRUCTION & CARE AFTER YOUR CERVICAL DISCECTOMY    Wear your neck collar at all times except when showering or shaving, being careful not to bend your head forward,  backward, or to the side while the collar is off.    No lifting anything heavier than a coffee cup or paperback book.    No driving until you begin physical therapy in three to four weeks. We recommend that you limit riding in a car because of the risk of an accident. If you are a passenger, wear your seatbelt.    You may bend over or reach over your head as long as you don't lift anything heavy.    Walk as much as possible.    Remove the bandage on the second day after surgery and leave the incision open to air. If you notice any redness, swelling or drainage, call the office. There are steri-strips across the incision. If these have not come off by the ninth or tenth day after surgery, peel them off gently.     You may shower five days after surgery. Don't let the water beat directly on the incision. Gently pat the incision dry.     Call as soon as possible after leaving the hospital to schedule an appointment with the Physician Assistant or Nurse Practitioner if any appointment has not already been made. Physical Therapy will be arranged when you return for this visit. You may get rid of your collar after this visit.     Your prescription for pain medication may be refilled for only half the original amount prior to your return office visit. Due to changes in Federal Law in order to have this medication refilled you must contact the office four days prior to the due date and make arrangements to pick the prescription up in the office. This prescription refill cannot be called in to the pharmacy. Your prescription will be ready for pick-up the day the refill is due.     Don't be alarmed if you experience some of your pre-operative symptoms after going home. This is not uncommon and normally goes away in a few days but may last longer. Pain, aching and stiffness in the neck, across the shoulders and between the shoulder blades are very common. If you have any questions or concerns don't hesitate to call the office.           Call for: questions or concerns    Follow-up Appointments  Future Appointments   Date Time Provider Department Center   5/23/2019 10:00 AM Gala Burr, [facility] None     Follow-up Information     Diego Benavidez MD .    Specialty:  Family Medicine  Contact information:  [address] [facility] [address]   [address]  [place] [facility] [place], [place] [address] [phone]                 Additional Instructions for the Follow-ups that You Need to Schedule     Discharge Follow-up with Specialty: Neurosurgery; 2 Weeks   As directed      Specialty:  Neurosurgery    Follow Up:  2 Weeks    Follow Up Details:  as previously arranged               Test Results Pending at Discharge     None    I discussed the discharge instructions with patient and nursing staff    NALDO Stephen  05/08/19  2:16 PM

## 2019-05-09 NOTE — PROGRESS NOTES
Subjective   Patient ID: John Jones is a 63 y.o. male is here today for follow-up. Mr. Jones is 2 weeks and 3 days out from having a  Anterior cervical discectomy C5-6 and C6-7 with anterior cervical fusion and instrumentation on 05/06/19 by Dr. Phoenix.  Patient reports weakness in his left arm. He deneis N/T or arm pain. Patient denies having fever. On a scale of 0-10, the patient rates his pain a 0.        Mr. Jones returns for post op re-evaluation. He is still having a considerable amount of weakness in the left arm. He denies arm pain.         The following portions of the patient's history were reviewed and updated as appropriate: allergies, current medications, past family history, past medical history, past social history, past surgical history and problem list.    Review of Systems   Respiratory: Negative for chest tightness.    Neurological: Positive for weakness (L deltoid and bicep). Negative for numbness.   All other systems reviewed and are negative.      Objective   Physical Exam   Constitutional: He is oriented to person, place, and time. He appears well-developed. No distress.   Neurological: He is alert and oriented to person, place, and time.   Patient has 3-/5 weakness L deltoid and 3+/5 weakness L bicep.    Skin: Skin is warm and dry.   Anterior cervical incision is well approximated. No redness, swelling or drainage.    Psychiatric: He has a normal mood and affect.   Vitals reviewed.    Neurologic Exam     Mental Status   Oriented to person, place, and time.       Assessment/Plan     Medical Decision Making:      Mr. Jones is 2 1/2 weeks out from the above stated surgery. He has been having weakness in the L deltoid and bicep since surgery. Dr. Phoenix is aware of the weakness. He is not surprised by it given the amount of nerve manipulation required during his surgery. It will take time. Mr. Jones also knows that there is a chance he will have a more permanent L arm weakness given the amount of  nerve compression that was there.     Mr. Jones has stopped the steroids since he developed a rash. He will begin outpatient PT. He will return to the office in 4 weeks with cervical X-rays. He will see Dr. Phoenix at the next appoitment. He will call if he develops more pain or worsening weakness.     Jhon SMITH was seen today for post-op.    Diagnoses and all orders for this visit:    Surgical followup visit  -     Ambulatory Referral to Physical Therapy POST OP, Evaluate and treat; Heat (BIONESS STIM IF AVAILABLE), Electrotherapy; Ultrasound, Moist heat; E-stim; ROM, Strengthening  -     XR Spine Cervical 2 View; Future    Muscle left arm weakness  -     Ambulatory Referral to Physical Therapy POST OP, Evaluate and treat; Heat (BIONESS STIM IF AVAILABLE), Electrotherapy; Ultrasound, Moist heat; E-stim; ROM, Strengthening  -     XR Spine Cervical 2 View; Future      Return in about 1 month (around 6/23/2019) for with Dr. Phoenix and medhat Obrien.

## 2019-05-09 NOTE — PAYOR COMM NOTE
"DISCHARGED        John Reeder (63 y.o. Male)     Date of Birth Social Security Number Address Home Phone MRN    1955  [id number] [address] [phone] [id number]    Shinto Marital Status          None        Admission Date Admission Type Admitting Provider Attending Provider Department, Room/Bed    5/6/19 Elective Roderick Phoenix MD  [address], P591/1    Discharge Date Discharge Disposition Discharge Destination        5/8/2019 Home or Self Care              Attending Provider:  (none)   Allergies:  No Known Allergies    Isolation:  None   Infection:  None   Code Status:  Prior    Ht:  180.3 cm (71\")   Wt:  74.5 kg (164 lb 3.9 oz)    Admission Cmt:  None   Principal Problem:  Cervical radiculopathy [M54.12]                 Active Insurance as of 5/6/2019     Primary Coverage     Payor Plan Insurance Group Employer/Plan Group    [employer] CIGNA [id number]     Payor Plan Address Payor Plan Phone Number Payor Plan Fax Number Effective Dates    [address] [address] [phone]  1/1/2019 - None Entered    [id number]       Subscriber Name Subscriber Birth Date Member ID       JOHN REEDER 1955 [id number]                 Emergency Contacts      (Rel.) Home Phone Work Phone Mobile Phone    DARWIN REEDER (Spouse) [phone] -- --          "

## 2019-05-14 ENCOUNTER — TELEPHONE (OUTPATIENT)
Dept: NEUROSURGERY | Facility: CLINIC | Age: 64
End: 2019-05-14

## 2019-05-23 ENCOUNTER — OFFICE VISIT (OUTPATIENT)
Dept: NEUROSURGERY | Facility: CLINIC | Age: 64
End: 2019-05-23

## 2019-05-23 VITALS
DIASTOLIC BLOOD PRESSURE: 76 MMHG | WEIGHT: 164 LBS | BODY MASS INDEX: 22.96 KG/M2 | SYSTOLIC BLOOD PRESSURE: 142 MMHG | HEIGHT: 71 IN

## 2019-05-23 DIAGNOSIS — Z09 SURGICAL FOLLOWUP VISIT: Primary | ICD-10-CM

## 2019-05-23 DIAGNOSIS — M62.81 MUSCLE LEFT ARM WEAKNESS: ICD-10-CM

## 2019-05-23 PROCEDURE — 99024 POSTOP FOLLOW-UP VISIT: CPT | Performed by: NURSE PRACTITIONER

## 2019-05-24 ENCOUNTER — CONVERSION ENCOUNTER (OUTPATIENT)
Dept: URGENT CARE | Facility: CLINIC | Age: 64
End: 2019-05-24

## 2019-06-03 VITALS — SYSTOLIC BLOOD PRESSURE: 182 MMHG | DIASTOLIC BLOOD PRESSURE: 112 MMHG

## 2019-06-04 VITALS
RESPIRATION RATE: 16 BRPM | BODY MASS INDEX: 23.1 KG/M2 | HEART RATE: 99 BPM | HEIGHT: 71 IN | SYSTOLIC BLOOD PRESSURE: 141 MMHG | WEIGHT: 165 LBS | DIASTOLIC BLOOD PRESSURE: 80 MMHG | OXYGEN SATURATION: 98 %

## 2019-06-06 NOTE — PROGRESS NOTES
Visit Type:  Acute Visit  Primary Care Provider:  Pramod CARSON MD    Chief Complaint:  rash.    History of Present Illness:  Patient is a 63-year-old male who had neck fusion surgery 3 weeks ago.  Patient started on dexamethasone and a tapering dose and completed 2 days ago.  Patient states he developed a rash approximately a week ago.  The patient went to his surgeon's office   yesterday for recheck at which time no treatment for the rash was instituted.  Side effect profile from the Dexamethasone states that it can cause a rash develop.  The patient says the rash is pruritic.  Patient has no shortness of breath or tightness in   his      Vital Signs:    Patient Profile:    63 Years Old Male  Height:     71 inches  Weight:     165 pounds  BMI:        23.01     O2 Sat:     98 %  Temp:       97.7 degrees F oral  Pulse rate: 99 / minute  Resp:       16 per minute  BP Sittin / 80  (left arm)    Cuff size:  regular      Problems: Active problems were reviewed with the patient during this visit.  Medications: Medications were reviewed with the patient during this visit.  Allergies: Allergies were reviewed with the patient during this visit.  No Known Allergy.  No Known Drug Allergy.        Vitals Entered By: Gale Chen (May 24, 2019 8:08 AM)    Active Medications (reviewed today):  AMLODIPINE BESYLATE 5 MG TABLET (AMLODIPINE BESYLATE) TAKE 1 TABLET BY MOUTH EVERY DAY  LEVOTHYROXINE SODIUM 150 MCG TABLET (LEVOTHYROXINE SODIUM) TAKE 1 TABLET BY MOUTH EVERY DAY  VITAMIN D 1000 UNIT ORAL TABLET (CHOLECALCIFEROL) take 1 tablet once daily  CARDURA 4 MG ORAL TABLET (DOXAZOSIN MESYLATE) TAKE 1 TABLET BY MOUTH EVERY DAY AT BEDTIME  MELOXICAM 15 MG TABLET (MELOXICAM) TAKE 1 TABLET BY MOUTH DAILY    Current Allergies (reviewed today):  No known allergies      Past Medical History:     Reviewed history from 2019 and no changes required:        Hypothyroidism        Osteoarthritis        BPH        HTN         Right Bicep rupture    Past Surgical History:     Reviewed history from 2017 and no changes required:        Bilateral Knee        Tonsillectomy         Colonoscopy (2017; due q5 years)        Neck Fusion 2019    Family History Summary:      Reviewed history Last on 2019 and no changes required:2019  Father - Has Family History of Other Cancer - bladder - Entered On: 2015    General Comments - FH:  Father  cad age 84, bladder cancer,hypothyroidism  Mother  cancer  pancreas  Family History of Bladder cancer  FH Hypertension  hypothyroidism      Social History:     Reviewed history from 2019 and no changes required:        Patient has never smoked.        Passive Smoke: N        Alcohol Use: N        Drug Use: N        HIV/High Risk: N        Regular Exercise: Y        Hx Domestic Abuse: N        Christian Affecting Care: N                         2 xhildren        No smoke         etoh         no drugs         retired        Risk Factors:     Smoked Tobacco Use:  Never smoker  Smokeless Tobacco Use:  Never  Passive smoke exposure:  no  Alcohol use:  no  Exercise:  no      Review of Systems   General: Denies headache, fevers, chills, body ache, sweats, anorexia, fatigue, malaise, weight loss.   Ears/Nose/Throat: Denies earache, ear discharge, tinnitus, decreased hearing, nasal congestion, nosebleeds, sore throat, hoarseness, dysphagia.   Respiratory: Denies cough, dyspnea, excessive sputum, hemoptysis, wheezing.   Skin: Complains of rash, itching.         Physical Exam    General:      well developed, well nourished, in no acute distress.    Head:      normocephalic and atraumatic.    Mouth:      no deformity or lesions with good dentition.    Lungs:       normal respiratory effort  Skin:       diffuse maculopapular erythematous rash on his chest and back.  Psych:      alert and cooperative; normal mood and affect; normal attention span and concentration.         Blood Pressure:  Today's BP: 141/80 mm Hg    Labwork:   Most Recent Lab Results:   LDL: 106 mg/dL 03/15/2019  HbA1c: : 5.1 % 12/11/2015        Medications Added to Medication List This Visit:  1)  Atarax 25 Mg  .... Take 1-2 tablets p.o. q.6 hours as needed itching  2)  Prednisone 20 Mg Oral Tablet (Prednisone) .... Take 2 tablets once a day for 5-7 days with food      Patient Instructions:  1)    The patient was started on prednisone and Atarax for the rash.  Patient has a scheduled appointment with the dermatologist 1st week of Ayah.  Patient was told to contact a dermatologist office to see if he can get in sooner for evaluation of this   particular rash.  Should patient develop shortness of breath or tightness in his throat go immediately to the emergency department for further treatment      ]          Laceration/ Wound     Objective     cm  Assessment:     Plan:   Rx:   ATARAX 25 MG take 1-2 tablets p.o. q.6 hours as needed itching, PREDNISONE 20 MG ORAL TABLET take 2 tablets once a day for 5-7 days with food.          Electronically signed by Mitesh Amezquita MD on 05/24/2019 at 8:27 AM  ________________________________________________________________________       Disclaimer: Converted Note message may not contain all data elements that existed in the legacy source system. Please see [employer] for the original note details.

## 2019-06-18 ENCOUNTER — OFFICE VISIT (OUTPATIENT)
Dept: PHYSICAL THERAPY | Facility: CLINIC | Age: 64
End: 2019-06-18

## 2019-06-18 ENCOUNTER — TRANSCRIBE ORDERS (OUTPATIENT)
Dept: PHYSICAL THERAPY | Facility: CLINIC | Age: 64
End: 2019-06-18

## 2019-06-18 DIAGNOSIS — M62.81 MUSCLE WEAKNESS OF LEFT ARM: Primary | ICD-10-CM

## 2019-06-18 DIAGNOSIS — R29.898 WEAKNESS OF LEFT ARM: Primary | ICD-10-CM

## 2019-06-18 PROCEDURE — 97110 THERAPEUTIC EXERCISES: CPT | Performed by: PHYSICAL THERAPIST

## 2019-06-18 NOTE — PROGRESS NOTES
Physical Therapy Daily Progress Note        Patient: John Jones   : 1955  Diagnosis/ICD-10 Code:  Weakness of left arm [R29.898]  Referring practitioner: NALDO Stephen  Date of Initial Visit: Type: THERAPY  Noted: 2019  Today's Date: 2019           John Jones reports: Patient reports continued weakness in LUE.  Reports compliance with HEP over the past week.   Subjective Questionnaire:       Subjective     Objective   See Exercise, Manual, and Modality Logs for complete treatment.       Assessment/Plan    Progress strengthening /stabilization /functional activity           Manual Therapy:         mins  51950;  Therapeutic Exercise:    30     mins  59517;     Neuromuscular Sekou:        mins  80579;    Therapeutic Activity:          mins  61497;     Gait Training:           mins  87621;     Ultrasound:          mins  73531;    Electrical Stimulation:         mins  60928 ( );  Dry Needling          mins self-pay    Timed Treatment:   30   mins   Total Treatment:     30   mins    Baltazar Rod PT  Physical Therapist

## 2019-06-20 ENCOUNTER — OFFICE VISIT (OUTPATIENT)
Dept: PHYSICAL THERAPY | Facility: CLINIC | Age: 64
End: 2019-06-20

## 2019-06-20 DIAGNOSIS — M54.12 RADICULOPATHY, CERVICAL: Primary | ICD-10-CM

## 2019-06-20 PROCEDURE — 97110 THERAPEUTIC EXERCISES: CPT | Performed by: PHYSICAL THERAPIST

## 2019-06-20 NOTE — PROGRESS NOTES
Physical Therapy Daily Progress Note        Patient: John Jones   : 1955  Diagnosis/ICD-10 Code:  Radiculopathy, cervical [M54.12]  Referring practitioner: NALDO Stephen  Date of Initial Visit: Type: THERAPY  Noted: 2019  Today's Date: 2019           John Jones reports: Continued weakness in LUE.  Reports compliance with HEP.         Subjective Evaluation    Pain  Current pain ratin           Objective   See Exercise, Manual, and Modality Logs for complete treatment.       Assessment & Plan     Assessment  Assessment details: Patient demonstrates continued weakness with left shoulder abduction and ER.  Patient progressing well with exercise.         Progress per Plan of Care           Manual Therapy:         mins  05817;  Therapeutic Exercise:    30     mins  87667;     Neuromuscular Sekou:        mins  77117;    Therapeutic Activity:          mins  04161;     Gait Training:           mins  10822;     Ultrasound:          mins  04295;    Electrical Stimulation:         mins  34688 ( );  Dry Needling          mins self-pay    Timed Treatment:   30   mins   Total Treatment:     30   mins    Baltazar Rod PT  Physical Therapist

## 2019-06-24 ENCOUNTER — OFFICE VISIT (OUTPATIENT)
Dept: PHYSICAL THERAPY | Facility: CLINIC | Age: 64
End: 2019-06-24

## 2019-06-24 DIAGNOSIS — M54.12 RADICULOPATHY, CERVICAL: Primary | ICD-10-CM

## 2019-06-24 PROCEDURE — 97110 THERAPEUTIC EXERCISES: CPT | Performed by: PHYSICAL THERAPIST

## 2019-06-24 NOTE — PROGRESS NOTES
Physical Therapy Daily Progress Note      Patient: John Jones   : 1955  Diagnosis/ICD-10 Code:  Radiculopathy, cervical [M54.12]  Referring practitioner: NALDO Stephen  Date of Initial Visit: Type: THERAPY  Noted: 2019  Today's Date: 2019           John Jones reports: Continued weakness in left UE.       Subjective Evaluation    Pain  Current pain ratin           Objective   See Exercise, Manual, and Modality Logs for complete treatment.       Assessment & Plan     Assessment  Assessment details: Patient demonstrates improving muscular strength in LUE.  Tolerates today's therapeutic exercise well.         Progress per Plan of Care           Manual Therapy:         mins  42847;  Therapeutic Exercise:    25     mins  24297;     Neuromuscular Sekou:        mins  70458;    Therapeutic Activity:          mins  46788;     Gait Training:           mins  56384;     Ultrasound:          mins  16946;    Electrical Stimulation:         mins  83204 ( );  Dry Needling          mins self-pay    Timed Treatment:   25   mins   Total Treatment:     25   mins    Baltazar Rod PT  Physical Therapist

## 2019-06-25 ENCOUNTER — OFFICE VISIT (OUTPATIENT)
Dept: NEUROSURGERY | Facility: CLINIC | Age: 64
End: 2019-06-25

## 2019-06-25 ENCOUNTER — TELEPHONE (OUTPATIENT)
Dept: NEUROSURGERY | Facility: CLINIC | Age: 64
End: 2019-06-25

## 2019-06-25 ENCOUNTER — HOSPITAL ENCOUNTER (OUTPATIENT)
Dept: GENERAL RADIOLOGY | Facility: HOSPITAL | Age: 64
Discharge: HOME | End: 2019-06-25
Admitting: NURSE PRACTITIONER

## 2019-06-25 VITALS — DIASTOLIC BLOOD PRESSURE: 78 MMHG | SYSTOLIC BLOOD PRESSURE: 152 MMHG | HEART RATE: 83 BPM

## 2019-06-25 DIAGNOSIS — M62.81 MUSCLE LEFT ARM WEAKNESS: ICD-10-CM

## 2019-06-25 DIAGNOSIS — Z09 SURGICAL FOLLOWUP VISIT: ICD-10-CM

## 2019-06-25 DIAGNOSIS — M54.12 CERVICAL RADICULOPATHY: Primary | ICD-10-CM

## 2019-06-25 PROCEDURE — 99024 POSTOP FOLLOW-UP VISIT: CPT | Performed by: NEUROLOGICAL SURGERY

## 2019-06-25 PROCEDURE — 72040 X-RAY EXAM NECK SPINE 2-3 VW: CPT

## 2019-06-25 NOTE — PROGRESS NOTES
Subjective   Patient ID: John Jones is a 63 y.o. male is here today for follow-up with new Cervical XR. He is 7 weeks and 1 day out from having a  Anterior cervical discectomy C5-6 and C6-7 with anterior cervical fusion and instrumentation done on 05/06/19. He is having difficulty raising his left arm.     History of Present Illness     She continues with weakness primarily in his left shoulder.  He cannot raise his arm up.  It does not seem to have improved at all since surgery.  His arm is otherwise doing okay and he does not have any pain in it.    The following portions of the patient's history were reviewed and updated as appropriate: allergies, current medications, past family history, past medical history, past social history, past surgical history and problem list.    Review of Systems   Respiratory: Negative for chest tightness and shortness of breath.    Cardiovascular: Negative for chest pain.   Musculoskeletal: Negative for neck pain.   Neurological: Positive for weakness ( Left shoulder).   All other systems reviewed and are negative.      Objective   Physical Exam   Constitutional: He is oriented to person, place, and time. He appears well-developed and well-nourished.   Neurological: He is oriented to person, place, and time.     Neurologic Exam     Mental Status   Oriented to person, place, and time.       Assessment/Plan   Independent Review of Radiographic Studies:      I reviewed his x-rays which were done earlier today.  This shows good alignment of the construct at 5 6 and C6-7.    I also reviewed a an EMG which was done on the order of his physical therapist which shows a C6 radiculopathy that is acute.    Medical Decision Making:      I told the patient and his wife that the EMG looks about like I would have expected it to look.  I also told him that I said immediately after surgery that this was a likely outcome because of the manipulation required to get the pressure off the nerve.  The  nerve itself is clearly intact and so this really should heal over time.  I do not think there is anything else we can do to make it heal any faster.  We will check him again in a couple of months.    John was seen today for post-op.    Diagnoses and all orders for this visit:    Cervical radiculopathy  -     XR Spine Cervical 2 or 3 View; Future    Muscle left arm weakness      Return in about 2 months (around 8/25/2019).

## 2019-06-25 NOTE — TELEPHONE ENCOUNTER
Called to remind patient to get their XR done before their appointment with Dr. Phoenix today 6/25/2019.

## 2019-06-27 ENCOUNTER — OFFICE VISIT (OUTPATIENT)
Dept: PHYSICAL THERAPY | Facility: CLINIC | Age: 64
End: 2019-06-27

## 2019-06-27 DIAGNOSIS — M54.12 RADICULOPATHY, CERVICAL: Primary | ICD-10-CM

## 2019-06-27 PROCEDURE — 97110 THERAPEUTIC EXERCISES: CPT | Performed by: PHYSICAL THERAPIST

## 2019-06-27 NOTE — PROGRESS NOTES
Physical Therapy Daily Progress Note        Patient: John Jnoes   : 1955  Diagnosis/ICD-10 Code:  Radiculopathy, cervical [M54.12]  Referring practitioner: NALDO Stephen  Date of Initial Visit: Type: THERAPY  Noted: 2019  Today's Date: 2019           John Jones reports: Continued weakness in LUE.  Patient had nerve conduction velocity and EMG done which indicated suspected C5-C6 radiculopathy.     Subjective Evaluation    Pain  Current pain ratin           Objective   See Exercise, Manual, and Modality Logs for complete treatment.       Assessment & Plan     Assessment  Impairments: impaired physical strength  Assessment details: Patient tolerates today's exercise well.  Demonstrates decreased compensatory movements with LUE after verbal cueing.          Progress per Plan of Care           Manual Therapy:         mins  36019;  Therapeutic Exercise:    30     mins  97101;     Neuromuscular Sekou:        mins  24655;    Therapeutic Activity:          mins  84719;     Gait Training:           mins  98534;     Ultrasound:          mins  68259;    Electrical Stimulation:         mins  98233 ( );  Dry Needling          mins self-pay    Timed Treatment:   30   mins   Total Treatment:     30   mins    Baltazar Rod PT  Physical Therapist

## 2019-07-02 ENCOUNTER — OFFICE VISIT (OUTPATIENT)
Dept: PHYSICAL THERAPY | Facility: CLINIC | Age: 64
End: 2019-07-02

## 2019-07-02 DIAGNOSIS — M54.12 RADICULOPATHY, CERVICAL: Primary | ICD-10-CM

## 2019-07-02 PROCEDURE — 97110 THERAPEUTIC EXERCISES: CPT | Performed by: PHYSICAL THERAPIST

## 2019-07-02 PROCEDURE — 97112 NEUROMUSCULAR REEDUCATION: CPT | Performed by: PHYSICAL THERAPIST

## 2019-07-02 NOTE — PROGRESS NOTES
Physical Therapy Daily Progress Note        Patient: John Jones   : 1955  Diagnosis/ICD-10 Code:  Radiculopathy, cervical [M54.12]  Referring practitioner: NALDO Stephen  Date of Initial Visit: Type: THERAPY  Noted: 2019  Today's Date: 2019  Patient seen for 5 sessions         John Jones reports: continued weakness in LUE.  Reports that he has been getting electrical stimulation treatment with dry needling over the past week and reports slight improvement in muscular strength.              Objective   See Exercise, Manual, and Modality Logs for complete treatment.       Assessment/Plan     Patient demonstrates improved muscle activation in left bicep muscle.  Continued weakness noted with left shoulder abduction, IR and ER.      Progress per Plan of Care           Manual Therapy:         mins  49502;  Therapeutic Exercise:    15     mins  40288;     Neuromuscular Sekou:    15    mins  97217;    Therapeutic Activity:          mins  55579;     Gait Training:           mins  45788;     Ultrasound:          mins  93618;    Electrical Stimulation:         mins  97057 ( );  Dry Needling          mins self-pay    Timed Treatment:   30   mins   Total Treatment:     30   mins    Baltazar Rod PT  Physical Therapist

## 2019-07-05 ENCOUNTER — OFFICE VISIT (OUTPATIENT)
Dept: PHYSICAL THERAPY | Facility: CLINIC | Age: 64
End: 2019-07-05

## 2019-07-05 DIAGNOSIS — M54.12 RADICULOPATHY, CERVICAL: Primary | ICD-10-CM

## 2019-07-05 PROCEDURE — 97110 THERAPEUTIC EXERCISES: CPT | Performed by: PHYSICAL THERAPIST

## 2019-07-05 NOTE — PROGRESS NOTES
Physical Therapy Daily Progress Note        Patient: John Jones   : 1955  Diagnosis/ICD-10 Code:  Radiculopathy, cervical [M54.12]  Referring practitioner: NALDO Stephen  Date of Initial Visit: Type: THERAPY  Noted: 2019  Today's Date: 2019  Patient seen for 6 sessions         John Jones reports: continued weakness in LUE.  Reports compliance with HEP.       Objective   See Exercise, Manual, and Modality Logs for complete treatment.       Assessment/Plan     Patient demonstrates decreased muscular strength and endurance with exercise.  Completes all exercises to fatigue.  Patient demonstrates improvements in muscular strength with prone T's/Y's exercises.      Progress per Plan of Care           Manual Therapy:         mins  20913;  Therapeutic Exercise:    30     mins  80815;     Neuromuscular Sekou:        mins  10085;    Therapeutic Activity:          mins  23390;     Gait Training:           mins  00772;     Ultrasound:          mins  40541;    Electrical Stimulation:         mins  19748 (MC );  Dry Needling          mins self-pay    Timed Treatment:   30   mins   Total Treatment:     30   mins    Baltazar Rod PT  Physical Therapist

## 2019-07-09 ENCOUNTER — OFFICE VISIT (OUTPATIENT)
Dept: PHYSICAL THERAPY | Facility: CLINIC | Age: 64
End: 2019-07-09

## 2019-07-09 DIAGNOSIS — R29.898 WEAKNESS OF LEFT ARM: ICD-10-CM

## 2019-07-09 DIAGNOSIS — M54.12 RADICULOPATHY, CERVICAL: Primary | ICD-10-CM

## 2019-07-09 PROCEDURE — 97110 THERAPEUTIC EXERCISES: CPT | Performed by: PHYSICAL THERAPIST

## 2019-07-09 PROCEDURE — 97112 NEUROMUSCULAR REEDUCATION: CPT | Performed by: PHYSICAL THERAPIST

## 2019-07-09 NOTE — PROGRESS NOTES
Physical Therapy Daily Progress Note        Patient: John Jones   : 1955  Diagnosis/ICD-10 Code:  Radiculopathy, cervical [M54.12]  Referring practitioner: NALDO Stephen  Date of Initial Visit: Type: THERAPY  Noted: 2019  Today's Date: 2019  Patient seen for 7 sessions         John Jones reports: Left arm is getting stronger.  Reports ability to abduct left shoulder over his head yesterday (3/5 mmt).         Objective   See Exercise, Manual, and Modality Logs for complete treatment.       Assessment/Plan     Patient tolerates exercise progression well.  Patient also tolerates increased resistance for biceps and shoulder ER exercises.  Patient progressing well.     Progress per Plan of Care           Manual Therapy:         mins  87016;  Therapeutic Exercise:    20     mins  23695;     Neuromuscular Sekou:    15    mins  42409;    Therapeutic Activity:          mins  48130;     Gait Training:           mins  31636;     Ultrasound:          mins  79464;    Electrical Stimulation:         mins  82614 ( );  Dry Needling          mins self-pay    Timed Treatment:   35   mins   Total Treatment:     35   mins    Baltazar Rod PT  Physical Therapist

## 2019-07-11 ENCOUNTER — OFFICE VISIT (OUTPATIENT)
Dept: PHYSICAL THERAPY | Facility: CLINIC | Age: 64
End: 2019-07-11

## 2019-07-11 DIAGNOSIS — M54.12 RADICULOPATHY, CERVICAL: Primary | ICD-10-CM

## 2019-07-11 PROCEDURE — 97110 THERAPEUTIC EXERCISES: CPT | Performed by: PHYSICAL THERAPIST

## 2019-07-11 PROCEDURE — 97112 NEUROMUSCULAR REEDUCATION: CPT | Performed by: PHYSICAL THERAPIST

## 2019-07-11 NOTE — PROGRESS NOTES
Physical Therapy Daily Progress Note        Patient: John Jones   : 1955  Diagnosis/ICD-10 Code:  Radiculopathy, cervical [M54.12]  Referring practitioner: NALDO Stephen  Date of Initial Visit: Type: THERAPY  Noted: 2019  Today's Date: 2019  Patient seen for 8 sessions         John Jones reports: Patient reports LUE continues to improve.  Reports compliance with HEP.     Objective   See Exercise, Manual, and Modality Logs for complete treatment.       Assessment/Plan     Patient progressing well with exercise at this time.  Continues to demonstrate weakness in left deltoid, bicep, IR and ER.      Progress per Plan of Care           Manual Therapy:         mins  42242;  Therapeutic Exercise:    15     mins  42681;     Neuromuscular Sekou:   15     mins  04853;    Therapeutic Activity:          mins  08222;     Gait Training:           mins  99190;     Ultrasound:          mins  39707;    Electrical Stimulation:         mins  47433 ( );  Dry Needling          mins self-pay    Timed Treatment:   30   mins   Total Treatment:     30   mins    Baltazar Rod PT  Physical Therapist

## 2019-07-16 ENCOUNTER — OFFICE VISIT (OUTPATIENT)
Dept: PHYSICAL THERAPY | Facility: CLINIC | Age: 64
End: 2019-07-16

## 2019-07-16 DIAGNOSIS — M54.12 RADICULOPATHY, CERVICAL: Primary | ICD-10-CM

## 2019-07-16 PROCEDURE — 97112 NEUROMUSCULAR REEDUCATION: CPT | Performed by: PHYSICAL THERAPIST

## 2019-07-16 PROCEDURE — 97110 THERAPEUTIC EXERCISES: CPT | Performed by: PHYSICAL THERAPIST

## 2019-07-16 NOTE — PROGRESS NOTES
Physical Therapy Daily Progress Note        Patient: John Jones   : 1955  Diagnosis/ICD-10 Code:  Radiculopathy, cervical [M54.12]  Referring practitioner: NALDO Stephen  Date of Initial Visit: Type: THERAPY  Noted: 2019  Today's Date: 2019  Patient seen for 9 sessions         John Jones reports:  Feeling well.  Continued weakness in left shoulder, specifically in deltoid.        Objective   See Exercise, Manual, and Modality Logs for complete treatment.       Assessment/Plan     Patient completes exercises without pain, however continued weakness observed.  Patient tolerates exercise progression well.     Progress per Plan of Care           Manual Therapy:         mins  71131;  Therapeutic Exercise:    15     mins  79891;     Neuromuscular Sekou:    15    mins  15351;    Therapeutic Activity:          mins  06813;     Gait Training:           mins  34237;     Ultrasound:          mins  42623;    Electrical Stimulation:         mins  72426 ( );  Dry Needling          mins self-pay    Timed Treatment:   30   mins   Total Treatment:     30   mins    Baltazar Rod PT  Physical Therapist

## 2019-07-18 ENCOUNTER — OFFICE VISIT (OUTPATIENT)
Dept: PHYSICAL THERAPY | Facility: CLINIC | Age: 64
End: 2019-07-18

## 2019-07-18 DIAGNOSIS — M54.12 RADICULOPATHY, CERVICAL: Primary | ICD-10-CM

## 2019-07-18 PROCEDURE — 97110 THERAPEUTIC EXERCISES: CPT | Performed by: PHYSICAL THERAPIST

## 2019-07-18 PROCEDURE — 97112 NEUROMUSCULAR REEDUCATION: CPT | Performed by: PHYSICAL THERAPIST

## 2019-07-18 NOTE — PROGRESS NOTES
Physical Therapy Daily Progress Note        Patient: John Jones   : 1955  Diagnosis/ICD-10 Code:  Radiculopathy, cervical [M54.12]  Referring practitioner: NALDO Stephen  Date of Initial Visit: Type: THERAPY  Noted: 2019  Today's Date: 2019  Patient seen for 10 sessions         John Jones reports:  Feeling well today.  Continued weakness in LUE.  No new complaints.         Objective   See Exercise, Manual, and Modality Logs for complete treatment.       Assessment/Plan     Patient demonstrates weakness/fatigue in LUE during today's treatment.  Patient tolerates slight increase in resistance well with today's exercise.     Progress per Plan of Care           Manual Therapy:         mins  45703;  Therapeutic Exercise:    15     mins  13654;     Neuromuscular Sekou:    15    mins  31857;    Therapeutic Activity:          mins  81951;     Gait Training:           mins  29693;     Ultrasound:          mins  49103;    Electrical Stimulation:         mins  47709 ( );  Dry Needling          mins self-pay    Timed Treatment:   30   mins   Total Treatment:     30   mins    Baltazar Rod PT  Physical Therapist

## 2019-07-23 ENCOUNTER — OFFICE VISIT (OUTPATIENT)
Dept: PHYSICAL THERAPY | Facility: CLINIC | Age: 64
End: 2019-07-23

## 2019-07-23 DIAGNOSIS — M54.12 RADICULOPATHY, CERVICAL: Primary | ICD-10-CM

## 2019-07-23 PROCEDURE — 97110 THERAPEUTIC EXERCISES: CPT | Performed by: PHYSICAL THERAPIST

## 2019-07-23 NOTE — PROGRESS NOTES
Physical Therapy Daily Progress Note        Patient: John Jones   : 1955  Diagnosis/ICD-10 Code:  Radiculopathy, cervical [M54.12]  Referring practitioner: NALDO Stephen  Date of Initial Visit: Type: THERAPY  Noted: 2019  Today's Date: 2019  Patient seen for 11 sessions         John Jones reports:  LUE continues to slowly improve.  Reports compliance with HEP.    Objective   See Exercise, Manual, and Modality Logs for complete treatment.     Assessment/Plan    Patient tolerates exercise progression well.     Progress per Plan of Care           Manual Therapy:         mins  50457;  Therapeutic Exercise:    30     mins  36842;     Neuromuscular Sekou:        mins  74684;    Therapeutic Activity:          mins  04674;     Gait Training:           mins  45944;     Ultrasound:          mins  87861;    Electrical Stimulation:         mins  56107 ( );  Dry Needling          mins self-pay    Timed Treatment:   30   mins   Total Treatment:     30   mins    Baltazar Rod PT  Physical Therapist

## 2019-07-29 ENCOUNTER — OFFICE VISIT (OUTPATIENT)
Dept: PHYSICAL THERAPY | Facility: CLINIC | Age: 64
End: 2019-07-29

## 2019-07-29 DIAGNOSIS — M54.12 RADICULOPATHY, CERVICAL: Primary | ICD-10-CM

## 2019-07-29 PROCEDURE — 97110 THERAPEUTIC EXERCISES: CPT | Performed by: PHYSICAL THERAPIST

## 2019-07-29 NOTE — PROGRESS NOTES
Physical Therapy Daily Progress Note        Patient: John Jones   : 1955  Diagnosis/ICD-10 Code:  Radiculopathy, cervical [M54.12]  Referring practitioner: NALDO Stephen  Date of Initial Visit: Type: THERAPY  Noted: 2019  Today's Date: 2019  Patient seen for 12 sessions         John Jones reports:  Left arm is feeling better.  Reports ability to raise left arm forward without need for assistance.  Reports getting cataract surgery last week, as well as scheduled for remaining eye tomorrow, therefore will hold on exercise until Friday.        Subjective Evaluation    Pain  Current pain ratin           Objective   See Exercise, Manual, and Modality Logs for complete treatment.       Assessment/Plan     Patient completes all exercises without discomfort.  Demonstrates improving muscular strength in LUE with shoulder flexion, abduction and external rotation.      Progress per Plan of Care           Manual Therapy:         mins  11300;  Therapeutic Exercise:    30     mins  35349;     Neuromuscular Sekou:        mins  54603;    Therapeutic Activity:          mins  31440;     Gait Training:           mins  31378;     Ultrasound:          mins  80882;    Electrical Stimulation:         mins  04676 ( );  Dry Needling          mins self-pay    Timed Treatment:   30   mins   Total Treatment:     30   mins    Baltazar Rod PT  Physical Therapist

## 2019-08-02 ENCOUNTER — OFFICE VISIT (OUTPATIENT)
Dept: PHYSICAL THERAPY | Facility: CLINIC | Age: 64
End: 2019-08-02

## 2019-08-02 DIAGNOSIS — M54.12 RADICULOPATHY, CERVICAL: Primary | ICD-10-CM

## 2019-08-02 PROCEDURE — 97110 THERAPEUTIC EXERCISES: CPT | Performed by: PHYSICAL THERAPIST

## 2019-08-02 NOTE — PROGRESS NOTES
Physical Therapy Daily Progress Note      Patient: John Jones   : 1955  Diagnosis/ICD-10 Code:  Radiculopathy, cervical [M54.12]  Referring practitioner: NALDO Stephen  Date of Initial Visit: Type: THERAPY  Noted: 2019  Today's Date: 2019  Patient seen for 13 sessions         John Jones reports:  Left arm is steadily improving.  Reports that he is now able to reach his axilla area to apply deodorant without laying supine to do so.       Objective   See Exercise, Manual, and Modality Logs for complete treatment.       Assessment/Plan     Patient tolerates exercise well.  Noted increased endurance during rhythmic stabilization exercise with weighted ball.     Progress per Plan of Care           Manual Therapy:         mins  90889;  Therapeutic Exercise:    35     mins  15427;     Neuromuscular Sekou:        mins  49997;    Therapeutic Activity:          mins  66263;     Gait Training:           mins  56420;     Ultrasound:          mins  77303;    Electrical Stimulation:         mins  50151 ( );  Dry Needling          mins self-pay    Timed Treatment:   35   mins   Total Treatment:     35   mins    Baltazar Rod PT  Physical Therapist

## 2019-08-06 ENCOUNTER — OFFICE VISIT (OUTPATIENT)
Dept: PHYSICAL THERAPY | Facility: CLINIC | Age: 64
End: 2019-08-06

## 2019-08-06 DIAGNOSIS — M54.12 RADICULOPATHY, CERVICAL: Primary | ICD-10-CM

## 2019-08-06 PROCEDURE — 97112 NEUROMUSCULAR REEDUCATION: CPT | Performed by: PHYSICAL THERAPIST

## 2019-08-06 PROCEDURE — 97110 THERAPEUTIC EXERCISES: CPT | Performed by: PHYSICAL THERAPIST

## 2019-08-06 NOTE — PROGRESS NOTES
Physical Therapy Daily Progress Note      Patient: John Jones   : 1955  Diagnosis/ICD-10 Code:  Radiculopathy, cervical [M54.12]  Referring practitioner: NALDO Stehpen  Date of Initial Visit: Type: THERAPY  Noted: 2019  Today's Date: 2019  Patient seen for 14 sessions         John Jones reports:  Left UE continues to feel stronger.  No new complaints.     Objective   See Exercise, Manual, and Modality Logs for complete treatment.       Assessment/Plan     Patient tolerates exercise well.  Complaint with HEP.     Progress per Plan of Care           Manual Therapy:         mins  93527;  Therapeutic Exercise:    15     mins  16918;     Neuromuscular Sekou:    15    mins  45896;    Therapeutic Activity:          mins  91605;     Gait Training:           mins  05326;     Ultrasound:          mins  39149;    Electrical Stimulation:         mins  98502 ( );  Dry Needling          mins self-pay    Timed Treatment:   30   mins   Total Treatment:     30   mins    Baltazar Rod PT  Physical Therapist

## 2019-08-08 ENCOUNTER — OFFICE VISIT (OUTPATIENT)
Dept: PHYSICAL THERAPY | Facility: CLINIC | Age: 64
End: 2019-08-08

## 2019-08-08 DIAGNOSIS — M54.12 RADICULOPATHY, CERVICAL: Primary | ICD-10-CM

## 2019-08-08 PROCEDURE — 97110 THERAPEUTIC EXERCISES: CPT | Performed by: PHYSICAL THERAPIST

## 2019-08-08 PROCEDURE — 97112 NEUROMUSCULAR REEDUCATION: CPT | Performed by: PHYSICAL THERAPIST

## 2019-08-08 NOTE — PROGRESS NOTES
Physical Therapy Daily Progress Note      Patient: John Jones   : 1955  Diagnosis/ICD-10 Code:  Radiculopathy, cervical [M54.12]  Referring practitioner: NALDO Stephen  Date of Initial Visit: Type: THERAPY  Noted: 2019  Today's Date: 2019  Patient seen for 15 sessions         John Jones reports:  Left UE feeling better.  Reports compliance with HEP.      Objective   See Exercise, Manual, and Modality Logs for complete treatment.       Assessment/Plan    Tolerates increased resistance during today's treatment.  Reports fatigue, however no pain in LUE at end of session.  Patient progressing well.     Progress per Plan of Care           Manual Therapy:         mins  77203;  Therapeutic Exercise:    15     mins  83645;     Neuromuscular Sekou:   15     mins  67230;    Therapeutic Activity:          mins  63610;     Gait Training:           mins  68817;     Ultrasound:         mins  95399;    Electrical Stimulation:         mins  27338 (MC );  Dry Needling          mins self-pay    Timed Treatment:   30   mins   Total Treatment:     30   mins    Batlazar Rod PT  Physical Therapist

## 2019-08-13 ENCOUNTER — OFFICE VISIT (OUTPATIENT)
Dept: PHYSICAL THERAPY | Facility: CLINIC | Age: 64
End: 2019-08-13

## 2019-08-13 DIAGNOSIS — M54.12 RADICULOPATHY, CERVICAL: Primary | ICD-10-CM

## 2019-08-13 PROCEDURE — 97110 THERAPEUTIC EXERCISES: CPT | Performed by: PHYSICAL THERAPIST

## 2019-08-13 PROCEDURE — 97112 NEUROMUSCULAR REEDUCATION: CPT | Performed by: PHYSICAL THERAPIST

## 2019-08-13 NOTE — PROGRESS NOTES
Physical Therapy Daily Progress Note        Patient: John Jones   : 1955  Diagnosis/ICD-10 Code:  Radiculopathy, cervical [M54.12]  Referring practitioner: NALDO Stephen  Date of Initial Visit: Type: THERAPY  Noted: 2019  Today's Date: 2019  Patient seen for 16 sessions         John Jones reports: Feeling well today, no new complaints at this time.      Objective   See Exercise, Manual, and Modality Logs for complete treatment.       Assessment/Plan    Tolerates increased resistance well today.  Reports fatigue in left shoulder, however completes all exercise without pain.  Continued weakness in left shoulder, most significant loss with shoulder abduction, flexion, and external rotation.     Progress per Plan of Care           Manual Therapy:         mins  94501;  Therapeutic Exercise:    20     mins  37815;     Neuromuscular Sekou:  15      mins  67387;    Therapeutic Activity:          mins  57863;     Gait Training:           mins  20307;     Ultrasound:          mins  24596;    Electrical Stimulation:         mins  08744 ( );  Dry Needling          mins self-pay    Timed Treatment:  35    mins   Total Treatment:     35   mins    Baltazar Rod PT  Physical Therapist

## 2019-08-15 ENCOUNTER — OFFICE VISIT (OUTPATIENT)
Dept: PHYSICAL THERAPY | Facility: CLINIC | Age: 64
End: 2019-08-15

## 2019-08-15 DIAGNOSIS — M54.12 RADICULOPATHY, CERVICAL: Primary | ICD-10-CM

## 2019-08-15 PROCEDURE — 97112 NEUROMUSCULAR REEDUCATION: CPT | Performed by: PHYSICAL THERAPIST

## 2019-08-15 PROCEDURE — 97110 THERAPEUTIC EXERCISES: CPT | Performed by: PHYSICAL THERAPIST

## 2019-08-15 NOTE — PROGRESS NOTES
Physical Therapy Daily Progress Note      Patient: John Jones   : 1955  Diagnosis/ICD-10 Code:  Radiculopathy, cervical [M54.12]  Referring practitioner: NALDO Stephen  Date of Initial Visit: Type: THERAPY  Noted: 2019  Today's Date: 8/15/2019  Patient seen for 17 sessions         John Jones reports:  Right shoulder continues to improve.  No new complaints.  Reports compliance with HEP.     Objective   See Exercise, Manual, and Modality Logs for complete treatment.       Assessment/Plan     Patient demonstrates tolerating exercise well.  Continues to progress well with intensity and duration with exercises.  Patient tolerates today's treatment well.     Progress per Plan of Care           Manual Therapy:         mins  74975;  Therapeutic Exercise:    20     mins  02839;     Neuromuscular Sekou:    15    mins  76507;    Therapeutic Activity:          mins  14119;     Gait Training:           mins  52031;     Ultrasound:          mins  01225;    Electrical Stimulation:         mins  29130 (MC );  Dry Needling          mins self-pay    Timed Treatment:   35   mins   Total Treatment:     35   mins    Baltazar Rod PT  Physical Therapist

## 2019-08-19 ENCOUNTER — OFFICE VISIT (OUTPATIENT)
Dept: PHYSICAL THERAPY | Facility: CLINIC | Age: 64
End: 2019-08-19

## 2019-08-19 DIAGNOSIS — M54.12 RADICULOPATHY, CERVICAL: Primary | ICD-10-CM

## 2019-08-19 PROCEDURE — 97112 NEUROMUSCULAR REEDUCATION: CPT | Performed by: PHYSICAL THERAPIST

## 2019-08-19 PROCEDURE — 97110 THERAPEUTIC EXERCISES: CPT | Performed by: PHYSICAL THERAPIST

## 2019-08-19 RX ORDER — AMLODIPINE BESYLATE 5 MG/1
TABLET ORAL
Qty: 90 TABLET | Refills: 3 | Status: SHIPPED | OUTPATIENT
Start: 2019-08-19 | End: 2020-02-04

## 2019-08-19 NOTE — PROGRESS NOTES
Physical Therapy Daily Progress Note      Patient: John Jones   : 1955  Diagnosis/ICD-10 Code:  Radiculopathy, cervical [M54.12]  Referring practitioner: NALDO Stephen  Date of Initial Visit: Type: THERAPY  Noted: 2019  Today's Date: 2019  Patient seen for 18 sessions         John Jones reports:  Continued improving strength in LUE.  No new complaints.     Objective   See Exercise, Manual, and Modality Logs for complete treatment.       Assessment/Plan     Patient tolerates progressive exercise well.  Continues to demonstrate fatigue with exercise, however no pain with treatment.  Patient progressing well.     Progress per Plan of Care           Manual Therapy:         mins  87013;  Therapeutic Exercise:    20     mins  51071;     Neuromuscular Sekou:    15    mins  47213;    Therapeutic Activity:          mins  69761;     Gait Training:           mins  65234;     Ultrasound:          mins  78074;    Electrical Stimulation:         mins  16664 ( );  Dry Needling          mins self-pay    Timed Treatment:   35   mins   Total Treatment:     35   mins    Baltazar Rod PT  Physical Therapist

## 2019-08-22 ENCOUNTER — OFFICE VISIT (OUTPATIENT)
Dept: PHYSICAL THERAPY | Facility: CLINIC | Age: 64
End: 2019-08-22

## 2019-08-22 DIAGNOSIS — M54.12 CERVICAL RADICULOPATHY: Primary | ICD-10-CM

## 2019-08-22 PROCEDURE — 97110 THERAPEUTIC EXERCISES: CPT | Performed by: PHYSICAL THERAPIST

## 2019-08-22 NOTE — PROGRESS NOTES
Physical Therapy Daily Progress Note      Patient: John Jones   : 1955  Diagnosis/ICD-10 Code:  Cervical radiculopathy [M54.12]  Referring practitioner: NALDO Stephen  Date of Initial Visit: Type: THERAPY  Noted: 2019  Today's Date: 2019  Patient seen for 19 sessions           Subjective Pt states that he is doing well overall this morning, having no new c/o.    Objective   See Exercise, Manual, and Modality Logs for complete treatment.       Assessment/Plan Pt still presents with weakness in his left shoulder, as evident during his exercises and when he tries to actively raise his left UE overhead (demonstrates compensatory movements).  Great effort on all exercises.    Progress per Plan of Care           Timed:         Manual Therapy:         mins  21306;     Therapeutic Exercise:    45     mins  07467;     Neuromuscular Sekou:        mins  08756;    Therapeutic Activity:          mins  63177;     Gait Training:           mins  19941;     Ultrasound:          mins  42207;    Ionto                                   mins   42389  Self Care                            mins   31691      Un-Timed:  Electrical Stimulation:         mins  85811 ( );  Dry Needling          mins self-pay  Traction          mins 35410      Timed Treatment:   45   mins   Total Treatment:     45   mins    Baltazar Rod PT  Physical Therapist

## 2019-08-23 NOTE — PROGRESS NOTES
Subjective   Patient ID: John Jones is a 63 y.o. male is here today for follow-up with new Cervical XR. He is 3 months and 21 days out from having a C5-6 and C6-7 ACDF done on 05/06/19. He is still in physical therapy. He has weakness in his left arm still. It is still improving.     History of Present Illness    This patient is doing okay overall.  He is getting some further strength in his left arm.  He does not have much pain.    The following portions of the patient's history were reviewed and updated as appropriate: allergies, current medications, past family history, past medical history, past social history, past surgical history and problem list.    Review of Systems   Respiratory: Negative for chest tightness and shortness of breath.    Cardiovascular: Negative for chest pain.   Musculoskeletal: Positive for neck pain.   Neurological: Positive for weakness.   All other systems reviewed and are negative.      Objective   Physical Exam   Constitutional: He is oriented to person, place, and time. He appears well-developed and well-nourished.   Neurological: He is oriented to person, place, and time.     Neurologic Exam     Mental Status   Oriented to person, place, and time.       Assessment/Plan   Independent Review of Radiographic Studies:      I reviewed his x-ray which was done today.  This shows good alignment of the construct at C5-6 and C6-7.    Medical Decision Making:      I told the patient I thought he would continue to improve.  We will plan to see him again in about 3 months.  I do not think we need another x-ray at that time.    John was seen today for follow-up.    Diagnoses and all orders for this visit:    Muscle left arm weakness      Return in about 3 months (around 11/27/2019).

## 2019-08-26 ENCOUNTER — OFFICE VISIT (OUTPATIENT)
Dept: PHYSICAL THERAPY | Facility: CLINIC | Age: 64
End: 2019-08-26

## 2019-08-26 DIAGNOSIS — M54.12 CERVICAL RADICULOPATHY: Primary | ICD-10-CM

## 2019-08-26 PROCEDURE — 97110 THERAPEUTIC EXERCISES: CPT | Performed by: PHYSICAL THERAPIST

## 2019-08-26 NOTE — PROGRESS NOTES
Physical Therapy Daily Progress Note    Patient: John Jones   : 1955  Diagnosis/ICD-10 Code:  Cervical radiculopathy [M54.12]  Referring practitioner: NALDO Stephen  Date of Initial Visit: Type: THERAPY  Noted: 2019  Today's Date: 2019  Patient seen for 20 sessions         John Jones reports:  Continues to notice improvement in strength.  No new complaints.     Objective   See Exercise, Manual, and Modality Logs for complete treatment.       Assessment/Plan    Tolerates exercise progression well.      Progress per Plan of Care           Manual Therapy:         mins  08231;  Therapeutic Exercise:    30     mins  38270;     Neuromuscular Sekou:        mins  50002;    Therapeutic Activity:          mins  92644;     Gait Training:           mins  34673;     Ultrasound:          mins  90904;    Electrical Stimulation:         mins  45750 ( );  Dry Needling          mins self-pay    Timed Treatment:   30   mins   Total Treatment:     30   mins    Baltazar Rod PT  Physical Therapist

## 2019-08-27 ENCOUNTER — HOSPITAL ENCOUNTER (OUTPATIENT)
Dept: GENERAL RADIOLOGY | Facility: HOSPITAL | Age: 64
Discharge: HOME | End: 2019-08-27
Admitting: NEUROLOGICAL SURGERY

## 2019-08-27 ENCOUNTER — OFFICE VISIT (OUTPATIENT)
Dept: NEUROSURGERY | Facility: CLINIC | Age: 64
End: 2019-08-27

## 2019-08-27 VITALS — HEART RATE: 75 BPM | DIASTOLIC BLOOD PRESSURE: 75 MMHG | SYSTOLIC BLOOD PRESSURE: 132 MMHG

## 2019-08-27 DIAGNOSIS — M54.12 CERVICAL RADICULOPATHY: ICD-10-CM

## 2019-08-27 DIAGNOSIS — M62.81 MUSCLE LEFT ARM WEAKNESS: Primary | ICD-10-CM

## 2019-08-27 PROCEDURE — 72040 X-RAY EXAM NECK SPINE 2-3 VW: CPT

## 2019-08-27 PROCEDURE — 99213 OFFICE O/P EST LOW 20 MIN: CPT | Performed by: NEUROLOGICAL SURGERY

## 2019-08-27 RX ORDER — MELOXICAM 15 MG/1
TABLET ORAL
COMMUNITY
Start: 2019-08-05 | End: 2019-11-11 | Stop reason: SDUPTHER

## 2019-08-29 ENCOUNTER — OFFICE VISIT (OUTPATIENT)
Dept: PHYSICAL THERAPY | Facility: CLINIC | Age: 64
End: 2019-08-29

## 2019-08-29 DIAGNOSIS — M54.12 CERVICAL RADICULOPATHY: Primary | ICD-10-CM

## 2019-08-29 PROCEDURE — 97110 THERAPEUTIC EXERCISES: CPT | Performed by: PHYSICAL THERAPIST

## 2019-08-29 PROCEDURE — 97112 NEUROMUSCULAR REEDUCATION: CPT | Performed by: PHYSICAL THERAPIST

## 2019-08-29 NOTE — PROGRESS NOTES
Physical Therapy Daily Progress Note    Patient: John Jones   : 1955  Diagnosis/ICD-10 Code:  Cervical radiculopathy [M54.12]  Referring practitioner: NALDO Stephen  Date of Initial Visit: Type: THERAPY  Noted: 2019  Today's Date: 2019  Patient seen for 21 sessions         John Jones reports: Feeling well today.  No new complaints.     Objective   See Exercise, Manual, and Modality Logs for complete treatment.       Assessment/Plan     Tolerates exercise well today.  Demonstrates proper technique with HEP.     Progress per Plan of Care           Manual Therapy:         mins  97194;  Therapeutic Exercise:    20     mins  79287;     Neuromuscular Sekou:   15     mins  00240;    Therapeutic Activity:          mins  61427;     Gait Training:           mins  41219;     Ultrasound:          mins  80952;    Electrical Stimulation:         mins  07195 ( );  Dry Needling          mins self-pay    Timed Treatment:   35   mins   Total Treatment:     35   mins    Baltazar Rod PT  Physical Therapist

## 2019-09-03 ENCOUNTER — OFFICE VISIT (OUTPATIENT)
Dept: PHYSICAL THERAPY | Facility: CLINIC | Age: 64
End: 2019-09-03

## 2019-09-03 DIAGNOSIS — M54.12 CERVICAL RADICULOPATHY: Primary | ICD-10-CM

## 2019-09-03 PROCEDURE — 97110 THERAPEUTIC EXERCISES: CPT | Performed by: PHYSICAL THERAPIST

## 2019-09-03 NOTE — PROGRESS NOTES
Re-Assessment / Re-Certification      Patient: John Jones   : 1955  Diagnosis/ICD-10 Code:  Cervical radiculopathy [M54.12]  Referring practitioner: NALDO Stephen  Date of Initial Visit: Type: THERAPY  Noted: 2019  Today's Date: 9/3/2019  Patient seen for 22 sessions      Subjective:   John Jones reports:  Left UE continues to improve.  Reports improved ability to hold spoon and bring to mouth over the weekend.  Reports continued improvement with lifting up forward and elevating to the side (flexion and abduction)  Clinical Progress: improved  Home Program Compliance: Yes  Treatment has included: therapeutic exercise and neuromuscular re-education      Objective       Strength/Myotome Testing     Left Shoulder     Planes of Motion   Flexion: 4-   Abduction: 4-   External rotation at 0°: 4-   Internal rotation at 0°: 4-     Right Shoulder   Normal muscle strength     Assessment/Plan  Progress toward previous goals: Partially Met    New Goals  Short-term goals (STG): In two weeks, patient will demonstrate at least 4+/5 muscular strength in LUE.   Long-term goals (LTG): In four weeks, patient will demonstrate compliance with I HEP.       Recommendations: Continue as planned  Timeframe: 2 weeks  Prognosis to achieve goals: good    PT Signature: Baltazar Rod PT      Based upon review of the patient's progress and continued therapy plan, it is my medical opinion that John Jones should continue physical therapy treatment at [facility]  [address] [place] [address] [phone].    Signature: __________________________________  Gala Burr APRN    Manual Therapy:         mins  92533;  Therapeutic Exercise:    30     mins  92858;     Neuromuscular Sekou:        mins  19077;    Therapeutic Activity:          mins  23054;     Gait Training:           mins  79956;     Ultrasound:          mins  64796;    Electrical Stimulation:          mins  27604 ( );  Dry Needling          mins self-pay    Timed Treatment:   30   mins   Total Treatment:     30   mins

## 2019-09-05 ENCOUNTER — OFFICE VISIT (OUTPATIENT)
Dept: PHYSICAL THERAPY | Facility: CLINIC | Age: 64
End: 2019-09-05

## 2019-09-05 DIAGNOSIS — M54.12 CERVICAL RADICULOPATHY: Primary | ICD-10-CM

## 2019-09-05 PROCEDURE — 97110 THERAPEUTIC EXERCISES: CPT | Performed by: PHYSICAL THERAPIST

## 2019-09-05 NOTE — PROGRESS NOTES
Physical Therapy Daily Progress Note    Patient: John Jones   : 1955  Diagnosis/ICD-10 Code:  Cervical radiculopathy [M54.12]  Referring practitioner: NALDO Stephen  Date of Initial Visit: Type: THERAPY  Noted: 2019  Today's Date: 2019  Patient seen for 23 sessions         John Jones reports:  Patient reports feeling well today.  No new complaints.     Objective   See Exercise, Manual, and Modality Logs for complete treatment.       Assessment/Plan     Patient demonstrates full left shoulder abduction in standing.  Patient compliant with HEP.  Patient demonstrate significant improvement in strength since beginning PT intervention, as well as proper technique with I HEP.  Patient to continue with I HEP at this time.     Progress per Plan of Care           Manual Therapy:         mins  62636;  Therapeutic Exercise:    40     mins  50931;     Neuromuscular Sekou:        mins  72076;    Therapeutic Activity:          mins  09713;     Gait Training:           mins  52649;     Ultrasound:          mins  23444;    Electrical Stimulation:         mins  69453 ( );  Dry Needling          mins self-pay    Timed Treatment:   40   mins   Total Treatment:     40   mins    Baltazar Rod PT  Physical Therapist

## 2019-09-13 ENCOUNTER — TRANSCRIBE ORDERS (OUTPATIENT)
Dept: PHYSICAL THERAPY | Facility: CLINIC | Age: 64
End: 2019-09-13

## 2019-09-13 DIAGNOSIS — Z96.651 STATUS POST TOTAL RIGHT KNEE REPLACEMENT: Primary | ICD-10-CM

## 2019-09-23 ENCOUNTER — TREATMENT (OUTPATIENT)
Dept: PHYSICAL THERAPY | Facility: CLINIC | Age: 64
End: 2019-09-23

## 2019-09-23 DIAGNOSIS — Z96.651 S/P TKR (TOTAL KNEE REPLACEMENT), RIGHT: Primary | ICD-10-CM

## 2019-09-23 PROCEDURE — 97110 THERAPEUTIC EXERCISES: CPT | Performed by: PHYSICAL THERAPIST

## 2019-09-23 PROCEDURE — 97162 PT EVAL MOD COMPLEX 30 MIN: CPT | Performed by: PHYSICAL THERAPIST

## 2019-09-23 NOTE — PROGRESS NOTES
Physical Therapy Initial Evaluation and Plan of Care    Patient: John Jones   : 1955  Diagnosis/ICD-10 Code:  S/P TKR (total knee replacement), right [Z96.651]  Referring practitioner: Wilfred Moore/Demetrio Bowles  Date of Initial Visit: 2019  Today's Date: 2019  Patient seen for 24 sessions           Subjective Questionnaire: Knee outcome survey:  51%        Subjective Evaluation    History of Present Illness  Date of surgery: 2019  Mechanism of injury: Patient presents to physical therapy s/p right TKA.  Patient reports no complications with surgery.  Reports that he has progressed from walker to straight cane with home health physical therapy.  Patient reports completing strengthening and range of motion exercise with home health.  Reports no episodes of loss of balance and reports that he was able to get on  and mow his lawn last week.  Reports no safety issues with stairs, tub/shower transfers, or with car/truck transfer. Patient wishes to return to ADL's without assistive device and without pain with ambulation.     Quality of life: excellent    Pain  Current pain ratin  At worst pain ratin  Quality: pulling and tight  Relieving factors: ice and medications  Aggravating factors: movement, squatting, standing and sleeping  Progression: improved    Treatments  Previous treatment: physical therapy (Home health PT )  Patient Goals  Patient goals for therapy: decreased edema, decreased pain, increased motion, increased strength, independence with ADLs/IADLs and return to sport/leisure activities             Objective       Observations     Right Knee   Positive for edema.     Additional Observation Details  Incision is covered with bandage during today's visit.  Patient reports no abnormal drainage or appearance during previous application of bandage.      Palpation     Additional Palpation Details  TTP noted at medial tibial plateau on right     Active Range of Motion   Left  Knee   Flexion: 137 degrees   Extension: 0 degrees     Right Knee   Flexion: 98 degrees   Extensor la degrees     Strength/Myotome Testing     Left Hip   Planes of Motion   Flexion: 4+  Extension: 4+  Abduction: 4+  Adduction: 5    Right Hip   Planes of Motion   Flexion: 4+  Extension: 4+  Abduction: 4  Adduction: 4+    Left Knee   Flexion: 5  Extension: 5  Quadriceps contraction: good    Right Knee   Flexion: 4  Extension: 4  Quadriceps contraction: fair    Swelling     Left Knee Girth Measurement (cm)   Joint line: 36 cm  10 cm above joint line: 37 cm  10 cm below joint line: 38 cm    Right Knee Girth Measurement (cm)   Joint line: 39 cm  10 cm above joint line: 44 cm  10 cm below joint line: 38 cm    Ambulation     Comments   Decreased right knee extension and flexion AROM noted during gait.  Incomplete extension at heel strike.  Patient ambulating with straight cane with proper technique.          Assessment & Plan     Assessment  Impairments: abnormal gait, abnormal muscle tone, abnormal or restricted ROM, impaired physical strength, lacks appropriate home exercise program and pain with function  Assessment details: Patient presents to physical therapy s/p right TKA.  Patient demonstrates decreased strength in RLE and deficits in AROM in right knee.  Patient also demonstrates significant edema in right knee, as well as gait deficits.  Patient demonstrates proper technique with ambulation with straight cane, as well as reports improved AROM and strength.  Patient is appropriate for physical therapy intervention in order to address these deficits so that he may return to ADL's without pain and ambulating without assistive device.   Prognosis: good  Functional Limitations: lifting, walking, moving in bed, standing and stooping  Goals  Plan Goals: In two weeks, patient will report at least 25% reduction in pain level.   In two weeks, patient will demonstrate equal bilateral knee flexion AROM.     In four  weeks, patient will demonstrate 5/5 muscular strength in BLE's.   In four weeks, patient will demonstrate at least 50% improvement in gait mechanics.  In four weeks, patient will demonstrate proper technique with HEP.   In four weeks, patient will demonstrate decreased perceived disability by increasing score on knee outcome survey by at least 15%.     Plan  Therapy options: will be seen for skilled physical therapy services  Planned modality interventions: cryotherapy, TENS and thermotherapy (hydrocollator packs)  Planned therapy interventions: flexibility, functional ROM exercises, gait training, home exercise program, joint mobilization, manual therapy, neuromuscular re-education, soft tissue mobilization, strengthening, stretching and therapeutic activities  Plan details: 2-3x per week for 30 visits or 90 day recertification period        Manual Therapy:         mins  28292;  Therapeutic Exercise:    30     mins  96903;     Neuromuscular Sekou:        mins  60684;    Therapeutic Activity:          mins  90842;     Gait Training:           mins  03625;     Ultrasound:          mins  08714;    Electrical Stimulation:         mins  87229 (MC );  Dry Needling          mins self-pay    Timed Treatment:   30   mins   Total Treatment:     60   mins    PT SIGNATURE: Baltazar Rod PT   DATE TREATMENT INITIATED: 9/23/2019    Initial Certification  Certification Period: 12/22/2019  I certify that the therapy services are furnished while this patient is under my care.  The services outlined above are required by this patient, and will be reviewed every 90 days.     PHYSICIAN: Wilfred Rainey/MD Demetrio      DATE:     Please sign and return via fax to [phone].. Thank you, [facility].

## 2019-09-25 ENCOUNTER — TREATMENT (OUTPATIENT)
Dept: PHYSICAL THERAPY | Facility: CLINIC | Age: 64
End: 2019-09-25

## 2019-09-25 DIAGNOSIS — Z96.651 S/P TKR (TOTAL KNEE REPLACEMENT), RIGHT: Primary | ICD-10-CM

## 2019-09-25 PROCEDURE — 97110 THERAPEUTIC EXERCISES: CPT | Performed by: PHYSICAL THERAPIST

## 2019-09-25 PROCEDURE — 97140 MANUAL THERAPY 1/> REGIONS: CPT | Performed by: PHYSICAL THERAPIST

## 2019-09-25 NOTE — PROGRESS NOTES
"   Physical Therapy Daily Progress Note    Patient: John Jones   : 1955  Diagnosis/ICD-10 Code:  S/P TKR (total knee replacement), right [Z96.651]  Referring practitioner: Wilfred Moore/Demetrio Gallegos*  Date of Initial Visit: Type: THERAPY  Noted: 2019    Type: THERAPY  Noted: 2019  Today's Date: 2019  Patient seen for 25 sessions         John Jones reports: Right knee feeling better.  Reports having \"fluid drained off knee\" at MD appointment today.  Reports having staples removed without complication as well.     Objective   See Exercise, Manual, and Modality Logs for complete treatment.       Assessment/Plan     Improved AROM in right knee noted after manual therapy and exercise.  Patient demonstrates proper technique with HEP.     Progress per Plan of Care           Manual Therapy:    10     mins  85000;  Therapeutic Exercise:    20     mins  29018;     Neuromuscular Sekou:        mins  32145;    Therapeutic Activity:          mins  93463;     Gait Training:           mins  55124;     Ultrasound:          mins  65848;    Electrical Stimulation:         mins  25240 ( );  Dry Needling          mins self-pay    Timed Treatment:   30   mins   Total Treatment:     30   mins    Baltazar Rod PT  Physical Therapist                    "

## 2019-09-30 ENCOUNTER — TREATMENT (OUTPATIENT)
Dept: PHYSICAL THERAPY | Facility: CLINIC | Age: 64
End: 2019-09-30

## 2019-09-30 DIAGNOSIS — Z96.651 S/P TKR (TOTAL KNEE REPLACEMENT), RIGHT: Primary | ICD-10-CM

## 2019-09-30 PROCEDURE — 97110 THERAPEUTIC EXERCISES: CPT | Performed by: PHYSICAL THERAPIST

## 2019-09-30 PROCEDURE — 97140 MANUAL THERAPY 1/> REGIONS: CPT | Performed by: PHYSICAL THERAPIST

## 2019-09-30 NOTE — PROGRESS NOTES
Physical Therapy Daily Progress Note      Patient: John Jones   : 1955  Diagnosis/ICD-10 Code:  S/P TKR (total knee replacement), right [Z96.651]  Referring practitioner: Wilfred Moore/Demetrio Gallegos*  Date of Initial Visit: Type: THERAPY  Noted: 2019    Type: THERAPY  Noted: 2019  Today's Date: 2019  Patient seen for 26 sessions         John Jones reports:  Feeling well today.  Reports some soreness and stiffness continue.     Objective   See Exercise, Manual, and Modality Logs for complete treatment.       Assessment/Plan     Tolerates progressive exercise well today.  Noted improved flexion AROM (112 degrees).     Progress per Plan of Care and Progress strengthening /stabilization /functional activity           Manual Therapy:    10     mins  61253;  Therapeutic Exercise:    20     mins  24171;     Neuromuscular Sekou:        mins  39388;    Therapeutic Activity:          mins  97046;     Gait Training:           mins  16998;     Ultrasound:          mins  36625;    Electrical Stimulation:         mins  17077 ( );  Dry Needling          mins self-pay    Timed Treatment:   30   mins   Total Treatment:     30   mins    Baltazar Rod PT  Physical Therapist

## 2019-10-02 ENCOUNTER — TREATMENT (OUTPATIENT)
Dept: PHYSICAL THERAPY | Facility: CLINIC | Age: 64
End: 2019-10-02

## 2019-10-02 DIAGNOSIS — Z96.651 S/P TKR (TOTAL KNEE REPLACEMENT), RIGHT: Primary | ICD-10-CM

## 2019-10-02 PROCEDURE — 97110 THERAPEUTIC EXERCISES: CPT | Performed by: PHYSICAL THERAPIST

## 2019-10-02 PROCEDURE — 97140 MANUAL THERAPY 1/> REGIONS: CPT | Performed by: PHYSICAL THERAPIST

## 2019-10-02 NOTE — PROGRESS NOTES
Physical Therapy Daily Progress Note      Patient: John Jones   : 1955  Diagnosis/ICD-10 Code:  S/P TKR (total knee replacement), right [Z96.651]  Referring practitioner: Wilfred Moore/Demetrio Gallegos*  Date of Initial Visit: Type: THERAPY  Noted: 2019  Today's Date: 10/2/2019  Patient seen for 3 sessions         John Jones reports:  Right knee feeling better.  No new complaints.     Objective   See Exercise, Manual, and Modality Logs for complete treatment.       Assessment/Plan     Observed 115 degrees of right knee flexion AROM after manual therapy.  Patient tolerates progressive exercise well today.     Progress per Plan of Care           Manual Therapy:    20     mins  63851;  Therapeutic Exercise:    15     mins  36913;     Neuromuscular Sekou:        mins  75781;    Therapeutic Activity:          mins  62601;     Gait Training:           mins  27326;     Ultrasound:          mins  67621;    Electrical Stimulation:         mins  09982 ( );  Dry Needling          mins self-pay    Timed Treatment:   35   mins   Total Treatment:     35   mins    Baltazar Rod PT  Physical Therapist

## 2019-10-07 ENCOUNTER — TREATMENT (OUTPATIENT)
Dept: PHYSICAL THERAPY | Facility: CLINIC | Age: 64
End: 2019-10-07

## 2019-10-07 DIAGNOSIS — Z96.651 S/P TKR (TOTAL KNEE REPLACEMENT), RIGHT: Primary | ICD-10-CM

## 2019-10-07 PROCEDURE — 97140 MANUAL THERAPY 1/> REGIONS: CPT | Performed by: PHYSICAL THERAPIST

## 2019-10-07 PROCEDURE — 97110 THERAPEUTIC EXERCISES: CPT | Performed by: PHYSICAL THERAPIST

## 2019-10-07 NOTE — PROGRESS NOTES
"   Physical Therapy Daily Progress Note    Patient: John Jones   : 1955  Diagnosis/ICD-10 Code:  S/P TKR (total knee replacement), right [Z96.651]  Referring practitioner: Wilfred Moore/Demetrio Gallegos*  Date of Initial Visit: Type: THERAPY  Noted: 2019  Today's Date: 10/7/2019  Patient seen for 4 sessions         John Jones reports: Reports that he scab came off today in the shower and states that he has a little \"opening\" on incision.  Reports that he has closed the area with steri strips.  Reports no signs of infection.     Objective   See Exercise, Manual, and Modality Logs for complete treatment.       Assessment/Plan     Improved right knee flexion AROM noted after today's treatment (117 degrees).  Patient tolerates progression of exercise well.      Progress per Plan of Care           Manual Therapy:    10     mins  53818;  Therapeutic Exercise:    20     mins  61343;     Neuromuscular Sekou:        mins  92325;    Therapeutic Activity:          mins  28021;     Gait Training:           mins  81566;     Ultrasound:          mins  40129;    Electrical Stimulation:         mins  67637 ( );  Dry Needling          mins self-pay    Timed Treatment:   30   mins   Total Treatment:     40   mins    Baltazar oRd PT  Physical Therapist                    "

## 2019-10-09 ENCOUNTER — TREATMENT (OUTPATIENT)
Dept: PHYSICAL THERAPY | Facility: CLINIC | Age: 64
End: 2019-10-09

## 2019-10-09 DIAGNOSIS — Z96.651 S/P TKR (TOTAL KNEE REPLACEMENT), RIGHT: Primary | ICD-10-CM

## 2019-10-09 PROCEDURE — 97140 MANUAL THERAPY 1/> REGIONS: CPT | Performed by: PHYSICAL THERAPIST

## 2019-10-09 PROCEDURE — 97110 THERAPEUTIC EXERCISES: CPT | Performed by: PHYSICAL THERAPIST

## 2019-10-09 NOTE — PROGRESS NOTES
Physical Therapy Daily Progress Note        Patient: John Jones   : 1955  Diagnosis/ICD-10 Code:  S/P TKR (total knee replacement), right [Z96.651]  Referring practitioner: Wilfred Moore/Demetrio Gallegos*  Date of Initial Visit: Type: THERAPY  Noted: 2019  Today's Date: 10/9/2019  Patient seen for 5 sessions         John Jones reports:  Right knee feels less stiff today.  Feeling better overall.  Reports incision continues with routine healing.     Objective   See Exercise, Manual, and Modality Logs for complete treatment.       Assessment/Plan     5-119 degrees    Tolerates progressive exercise well this date.  Patient progressing well.         Progress per Plan of Care           Manual Therapy:    10     mins  55911;  Therapeutic Exercise:    30     mins  72000;     Neuromuscular Sekou:        mins  31405;    Therapeutic Activity:          mins  21326;     Gait Training:           mins  31527;     Ultrasound:          mins  03971;    Electrical Stimulation:         mins  23899 ( );  Dry Needling          mins self-pay    Timed Treatment:   40   mins   Total Treatment:     50   mins    Baltazar Rod PT  Physical Therapist

## 2019-10-10 ENCOUNTER — TREATMENT (OUTPATIENT)
Dept: PHYSICAL THERAPY | Facility: CLINIC | Age: 64
End: 2019-10-10

## 2019-10-10 DIAGNOSIS — Z96.651 S/P TKR (TOTAL KNEE REPLACEMENT), RIGHT: Primary | ICD-10-CM

## 2019-10-10 PROCEDURE — 97110 THERAPEUTIC EXERCISES: CPT | Performed by: PHYSICAL THERAPIST

## 2019-10-10 PROCEDURE — 97140 MANUAL THERAPY 1/> REGIONS: CPT | Performed by: PHYSICAL THERAPIST

## 2019-10-10 NOTE — PROGRESS NOTES
Physical Therapy Daily Progress Note    Patient: John Jones   : 1955  Diagnosis/ICD-10 Code:  S/P TKR (total knee replacement), right [Z96.651]  Referring practitioner: Wilfred Moore/Demetrio Gallegos*  Date of Initial Visit: Type: THERAPY  Noted: 2019  Today's Date: 10/10/2019  Patient seen for 6 sessions         John Jones reports:  Right knee feeling well today.  No new complaints.     Objective   See Exercise, Manual, and Modality Logs for complete treatment.       Assessment/Plan     5-124 degrees of right knee AROM    Patient tolerates manual therapy and progression of exercise well.  Able to tolerate 30 consecutive ball tosses off rebounder while maintaining SLS on airex, demonstrating quality dynamic stability.     Progress per Plan of Care           Manual Therapy:    10     mins  48787;  Therapeutic Exercise:    30     mins  40313;     Neuromuscular Sekou:        mins  48180;    Therapeutic Activity:          mins  46733;     Gait Training:           mins  65515;     Ultrasound:          mins  97825;    Electrical Stimulation:         mins  05191 ( );  Dry Needling          mins self-pay    Timed Treatment:   40   mins   Total Treatment:     40   mins    Baltazar Rod PT  Physical Therapist

## 2019-10-14 ENCOUNTER — TREATMENT (OUTPATIENT)
Dept: PHYSICAL THERAPY | Facility: CLINIC | Age: 64
End: 2019-10-14

## 2019-10-14 DIAGNOSIS — Z96.651 S/P TKR (TOTAL KNEE REPLACEMENT), RIGHT: Primary | ICD-10-CM

## 2019-10-14 PROCEDURE — 97110 THERAPEUTIC EXERCISES: CPT | Performed by: PHYSICAL THERAPIST

## 2019-10-14 PROCEDURE — 97140 MANUAL THERAPY 1/> REGIONS: CPT | Performed by: PHYSICAL THERAPIST

## 2019-10-14 NOTE — PROGRESS NOTES
Physical Therapy Daily Progress Note    Patient: John Jones   : 1955  Diagnosis/ICD-10 Code:  S/P TKR (total knee replacement), right [Z96.651]  Referring practitioner: Wilfred Moore/Demetrio Gallegos*  Date of Initial Visit: Type: THERAPY  Noted: 2019  Today's Date: 10/14/2019  Patient seen for 7 sessions         John Jones reports:  Right knee continues to feel better.  Reports compliance with HEP.     Objective   See Exercise, Manual, and Modality Logs for complete treatment.       Assessment/Plan     Right knee AROM:  4-120 degrees    Patient progressing well.  Demonstrates improved dynamic stability on rockerboard.      Progress per Plan of Care           Manual Therapy:    10     mins  76845;  Therapeutic Exercise:    30     mins  34345;     Neuromuscular Sekou:        mins  97224;    Therapeutic Activity:          mins  52646;     Gait Training:           mins  64988;     Ultrasound:          mins  24595;    Electrical Stimulation:         mins  48839 ( );  Dry Needling          mins self-pay    Timed Treatment:   40   mins   Total Treatment:     40   mins    Baltazar Rod PT  Physical Therapist

## 2019-10-16 ENCOUNTER — TREATMENT (OUTPATIENT)
Dept: PHYSICAL THERAPY | Facility: CLINIC | Age: 64
End: 2019-10-16

## 2019-10-16 DIAGNOSIS — Z96.651 S/P TKR (TOTAL KNEE REPLACEMENT), RIGHT: Primary | ICD-10-CM

## 2019-10-16 PROCEDURE — 97110 THERAPEUTIC EXERCISES: CPT | Performed by: PHYSICAL THERAPIST

## 2019-10-16 PROCEDURE — 97140 MANUAL THERAPY 1/> REGIONS: CPT | Performed by: PHYSICAL THERAPIST

## 2019-10-16 NOTE — PROGRESS NOTES
Physical Therapy Daily Progress Note    Patient: John Jones   : 1955  Diagnosis/ICD-10 Code:  S/P TKR (total knee replacement), right [Z96.651]  Referring practitioner: Wilfred Moore/Demetrio Gallegos*  Date of Initial Visit: Type: THERAPY  Noted: 2019  Today's Date: 10/16/2019  Patient seen for 8 sessions         John Jones reports:  Right knee continues to improve.  Reports still reapplying manage to incision due to opening.  No new complaints.     Objective   See Exercise, Manual, and Modality Logs for complete treatment.       Assessment/Plan     Tolerates today's treatment well.     Progress per Plan of Care           Manual Therapy:    10     mins  98918;  Therapeutic Exercise:    30     mins  65796;     Neuromuscular Sekou:        mins  18574;    Therapeutic Activity:          mins  48270;     Gait Training:           mins  31856;     Ultrasound:          mins  89217;    Electrical Stimulation:         mins  78164 ( );  Dry Needling          mins self-pay    Timed Treatment:   40   mins   Total Treatment:     50   mins    Baltazar Rod PT  Physical Therapist

## 2019-10-18 ENCOUNTER — TREATMENT (OUTPATIENT)
Dept: PHYSICAL THERAPY | Facility: CLINIC | Age: 64
End: 2019-10-18

## 2019-10-18 DIAGNOSIS — Z96.651 S/P TKR (TOTAL KNEE REPLACEMENT), RIGHT: Primary | ICD-10-CM

## 2019-10-18 PROCEDURE — 97110 THERAPEUTIC EXERCISES: CPT | Performed by: PHYSICAL THERAPIST

## 2019-10-18 PROCEDURE — 97140 MANUAL THERAPY 1/> REGIONS: CPT | Performed by: PHYSICAL THERAPIST

## 2019-10-18 NOTE — PROGRESS NOTES
Physical Therapy Daily Progress Note    Patient: John Jones   : 1955  Diagnosis/ICD-10 Code:  S/P TKR (total knee replacement), right [Z96.651]  Referring practitioner: Wilfred Moore/Demetrio Gallegos*  Date of Initial Visit: Type: THERAPY  Noted: 2019  Today's Date: 10/18/2019  Patient seen for 9 sessions         John Jones reports:  Right knee continues to improve.     Objective   See Exercise, Manual, and Modality Logs for complete treatment.       Assessment/Plan    Right knee AROM:  5-122 degrees     Patient tolerates treatment well.  Completes eccentric SL squat without pain in right knee.      Progress per Plan of Care           Manual Therapy:    10     mins  04900;  Therapeutic Exercise:    35    mins  79403;     Neuromuscular Sekou:        mins  89214;    Therapeutic Activity:          mins  79621;     Gait Training:           mins  81200;     Ultrasound:          mins  68516;    Electrical Stimulation:         mins  87453 ( );  Dry Needling          mins self-pay    Timed Treatment:   45   mins   Total Treatment:     45   mins    Baltazar Rod PT  Physical Therapist

## 2019-10-21 ENCOUNTER — TREATMENT (OUTPATIENT)
Dept: PHYSICAL THERAPY | Facility: CLINIC | Age: 64
End: 2019-10-21

## 2019-10-21 DIAGNOSIS — Z96.651 S/P TKR (TOTAL KNEE REPLACEMENT), RIGHT: Primary | ICD-10-CM

## 2019-10-21 PROCEDURE — 97140 MANUAL THERAPY 1/> REGIONS: CPT | Performed by: PHYSICAL THERAPIST

## 2019-10-21 PROCEDURE — 97110 THERAPEUTIC EXERCISES: CPT | Performed by: PHYSICAL THERAPIST

## 2019-10-21 NOTE — PROGRESS NOTES
Physical Therapy Daily Progress Note      Patient: John Jones   : 1955  Diagnosis/ICD-10 Code:  S/P TKR (total knee replacement), right [Z96.651]  Referring practitioner: Wilfred Moore/Demetrio Gallegos*  Date of Initial Visit: Type: THERAPY  Noted: 2019  Today's Date: 10/21/2019  Patient seen for 10 sessions         John Jones reports: Right knee feeling well.  No new complaints.     Objective   See Exercise, Manual, and Modality Logs for complete treatment.       Assessment/Plan     Right knee AROM:  3-117 degrees    Tolerates today's treatment well.  Improved extension AROM noted this date.  Reassess at next visit.     Progress per Plan of Care           Manual Therapy:    15     mins  18001;  Therapeutic Exercise:    20     mins  11010;     Neuromuscular Sekou:        mins  30987;    Therapeutic Activity:          mins  94027;     Gait Training:           mins  71611;     Ultrasound:          mins  76556;    Electrical Stimulation:         mins  47846 ( );  Dry Needling          mins self-pay    Timed Treatment:   35   mins   Total Treatment:     45   mins    Baltazar Rod PT  Physical Therapist

## 2019-10-23 ENCOUNTER — TREATMENT (OUTPATIENT)
Dept: PHYSICAL THERAPY | Facility: CLINIC | Age: 64
End: 2019-10-23

## 2019-10-23 DIAGNOSIS — Z96.651 S/P TKR (TOTAL KNEE REPLACEMENT), RIGHT: Primary | ICD-10-CM

## 2019-10-23 PROCEDURE — 97140 MANUAL THERAPY 1/> REGIONS: CPT | Performed by: PHYSICAL THERAPIST

## 2019-10-23 PROCEDURE — 97110 THERAPEUTIC EXERCISES: CPT | Performed by: PHYSICAL THERAPIST

## 2019-10-23 NOTE — PROGRESS NOTES
Physical Therapy Daily Progress Note    Patient: John Jones   : 1955  Diagnosis/ICD-10 Code:  S/P TKR (total knee replacement), right [Z96.651]  Referring practitioner: Wilfred Moore/Demetrio Gallegos*  Date of Initial Visit: Type: THERAPY  Noted: 2019  Today's Date: 10/23/2019  Patient seen for 11 sessions         John Jones reports:  Right knee feeling well.  Compliance with HEP.     Knee outcome survey:  89%     Objective   See Exercise, Manual, and Modality Logs for complete treatment.       Assessment/Plan     Patient demonstrates meeting goals at this time, including ROM and strength.  Patient also compliant with HEP.  Patient appropriate to continue rehabilitation with I HEP.     Progress per Plan of Care           Manual Therapy:    10     mins  68073;  Therapeutic Exercise:    20     mins  47005;     Neuromuscular Sekou:        mins  89322;    Therapeutic Activity:          mins  29273;     Gait Training:           mins  59140;     Ultrasound:          mins  65188;    Electrical Stimulation:         mins  04081 ( );  Dry Needling          mins self-pay    Timed Treatment:   30   mins   Total Treatment:     30   mins    Baltazar Rod PT  Physical Therapist

## 2019-11-07 ENCOUNTER — TELEPHONE (OUTPATIENT)
Dept: FAMILY MEDICINE CLINIC | Facility: CLINIC | Age: 64
End: 2019-11-07

## 2019-11-08 NOTE — PROGRESS NOTES
Subjective   Patient ID: John Jones is a 63 y.o. male is here today for follow-up. He is 6 months and 6 days out from having a C5-6 and C6-7 ACDF done on 05/06/19. He is doing well. His left arm weakness is still improving. He is still doing his HEP.      History of Present Illness     This patient returns today.  He is doing well.  He still has a little weakness particularly in his deltoid on the left but is otherwise continuing to improve.  Even that strength is getting better.    The following portions of the patient's history were reviewed and updated as appropriate: allergies, current medications, past family history, past medical history, past social history, past surgical history and problem list.    Review of Systems   Respiratory: Negative for chest tightness and shortness of breath.    Cardiovascular: Negative for chest pain.   Musculoskeletal: Negative for neck pain.   Neurological: Positive for weakness.   All other systems reviewed and are negative.      Objective   Physical Exam   Constitutional: He is oriented to person, place, and time. He appears well-developed and well-nourished.   Neurological: He is oriented to person, place, and time.     Neurologic Exam     Mental Status   Oriented to person, place, and time.       Assessment/Plan   Independent Review of Radiographic Studies:      There are no new x-rays to review    Medical Decision Making:      I told the patient and his wife I would check in one more time in about 6 months with an x-ray.  He will call in the meantime if anything else happens.    John was seen today for follow-up.    Diagnoses and all orders for this visit:    Cervical radiculopathy  -     XR Spine Cervical Complete With Flex Ext; Future      Return in about 6 months (around 5/12/2020).

## 2019-11-12 ENCOUNTER — OFFICE VISIT (OUTPATIENT)
Dept: NEUROSURGERY | Facility: CLINIC | Age: 64
End: 2019-11-12

## 2019-11-12 VITALS — SYSTOLIC BLOOD PRESSURE: 140 MMHG | HEART RATE: 76 BPM | DIASTOLIC BLOOD PRESSURE: 80 MMHG

## 2019-11-12 DIAGNOSIS — M54.12 CERVICAL RADICULOPATHY: Primary | ICD-10-CM

## 2019-11-12 PROCEDURE — 99213 OFFICE O/P EST LOW 20 MIN: CPT | Performed by: NEUROLOGICAL SURGERY

## 2019-11-12 RX ORDER — MELOXICAM 15 MG/1
TABLET ORAL
Qty: 90 TABLET | Refills: 3 | Status: SHIPPED | OUTPATIENT
Start: 2019-11-12 | End: 2020-02-04

## 2019-12-04 ENCOUNTER — TRANSCRIBE ORDERS (OUTPATIENT)
Dept: PHYSICAL THERAPY | Facility: CLINIC | Age: 64
End: 2019-12-04

## 2019-12-04 DIAGNOSIS — Z96.652 HX OF TOTAL KNEE ARTHROPLASTY, LEFT: Primary | ICD-10-CM

## 2019-12-05 ENCOUNTER — TREATMENT (OUTPATIENT)
Dept: PHYSICAL THERAPY | Facility: CLINIC | Age: 64
End: 2019-12-05

## 2019-12-05 DIAGNOSIS — Z96.652 S/P TKR (TOTAL KNEE REPLACEMENT), LEFT: Primary | ICD-10-CM

## 2019-12-05 PROCEDURE — 97162 PT EVAL MOD COMPLEX 30 MIN: CPT | Performed by: PHYSICAL THERAPIST

## 2019-12-05 PROCEDURE — 97110 THERAPEUTIC EXERCISES: CPT | Performed by: PHYSICAL THERAPIST

## 2019-12-05 NOTE — PROGRESS NOTES
Physical Therapy Initial Evaluation and Plan of Care      Patient: John Jones   : 1955  Diagnosis/ICD-10 Code:  S/P TKR (total knee replacement), left [Z96.652]  Referring practitioner: NALDO Clay  Date of Initial Visit: 2019  Today's Date: 2019  Patient seen for 1 sessions           Subjective Questionnaire:  Knee outcome survey:  49%      Subjective Evaluation    History of Present Illness  Date of surgery: 2019  Mechanism of injury: Patient presents to physical therapy s/p left TKA.  Patient reports increased swelling in entire left leg (more than when had same surgery on right knee).  Patient reports that he has been working with home health and has already progressed to walking without assistive device.  Denies pain in calf or any N&T in LLE.  Patient had hypersensitivity in right knee, however no such sensations felt in left after surgery.     Pain  Current pain ratin  At worst pain ratin  Quality: pulling and tight  Relieving factors: ice, relaxation and medications  Aggravating factors: squatting, ambulation, movement and stairs  Progression: improved    Treatments  Previous treatment: physical therapy  Patient Goals  Patient goals for therapy: decreased edema, decreased pain, increased motion, increased strength, independence with ADLs/IADLs and return to sport/leisure activities             Objective       Observations   Left Knee   Positive for effusion. Negative for drainage.     Additional Observation Details  Normal healing noted for incision    Active Range of Motion   Left Knee   Flexion: 100 degrees   Extension: 5 degrees     Patellar Mobility   Left Knee Hypomobile in the left medial and left superior patellar tendon(s).     Strength/Myotome Testing     Left Hip   Planes of Motion   Flexion: 4+  External rotation: 5  Internal rotation: 5    Right Hip   Planes of Motion   Flexion: 5  External rotation: 5  Internal rotation: 5    Left Knee   Flexion:  4+  Extension: 4    Right Knee   Flexion: 5  Extension: 5    Swelling     Left Knee Girth Measurement (cm)   Joint line: 44 cm  10 cm above joint line: 43 cm  10 cm below joint line: 39 cm    Right Knee Girth Measurement (cm)   Joint line: 39 cm  10 cm above joint line: 40 cm  10 cm below joint line: 36 cm    Ambulation     Observational Gait   Decreased left step length.          Assessment & Plan     Assessment  Impairments: abnormal gait, abnormal or restricted ROM, impaired physical strength, lacks appropriate home exercise program and pain with function  Assessment details: Patient presents to physical therapy s/p left TKA.  Patient demonstrates restricted left knee AROM, deficits in muscular strength in LLE, and abnormalities in gait.  Patient would benefit from physical therapy intervention in order to address these deficits so that he may return to ADL's and recreational activities at his PLOF.   Prognosis: good  Functional Limitations: lifting, walking and uncomfortable because of pain  Goals  Plan Goals: In three weeks, patient will report at least 25% reduction in pain level.   In three weeks, patient will demonstrate equal bilateral knee flexion AROM.     In six weeks, patient will demonstrate 4+/5 muscular strength in BLE's.   In six weeks, patient will demonstrate at least 50% improvement in gait mechanics.  In six weeks, patient will demonstrate proper technique with HEP.   In six weeks, patient will demonstrate decreased perceived disability by increasing score on knee outcome survey by at least 15%    Plan  Therapy options: will be seen for skilled physical therapy services  Planned modality interventions: cryotherapy, electrical stimulation/Russian stimulation, TENS and thermotherapy (hydrocollator packs)  Planned therapy interventions: gait training, home exercise program, joint mobilization, functional ROM exercises, manual therapy, neuromuscular re-education, soft tissue mobilization,  strengthening and therapeutic activities  Frequency: 3x week  Plan details: 30 visits or 90 day recertification period.         Manual Therapy:         mins  81558;  Therapeutic Exercise:    30     mins  45416;     Neuromuscular Sekou:        mins  75233;    Therapeutic Activity:          mins  37401;     Gait Training:           mins  51259;     Ultrasound:          mins  83617;    Electrical Stimulation:         mins  36192 ( );  Dry Needling          mins self-pay    Timed Treatment:   30   mins   Total Treatment:     55   mins    PT SIGNATURE: Baltazar Rod PT   DATE TREATMENT INITIATED: 12/5/2019    Initial Certification  Certification Period: 3/4/2020  I certify that the therapy services are furnished while this patient is under my care.  The services outlined above are required by this patient, and will be reviewed every 90 days.     PHYSICIAN: Lauren Feldman APRN      DATE:     Please sign and return via fax to [phone].. Thank you, [facility].

## 2019-12-09 ENCOUNTER — TREATMENT (OUTPATIENT)
Dept: PHYSICAL THERAPY | Facility: CLINIC | Age: 64
End: 2019-12-09

## 2019-12-09 DIAGNOSIS — Z96.652 S/P TKR (TOTAL KNEE REPLACEMENT), LEFT: Primary | ICD-10-CM

## 2019-12-09 PROCEDURE — 97140 MANUAL THERAPY 1/> REGIONS: CPT | Performed by: PHYSICAL THERAPIST

## 2019-12-09 PROCEDURE — 97110 THERAPEUTIC EXERCISES: CPT | Performed by: PHYSICAL THERAPIST

## 2019-12-09 NOTE — PROGRESS NOTES
Physical Therapy Daily Progress Note      Patient: John Jones   : 1955  Diagnosis/ICD-10 Code:  S/P TKR (total knee replacement), left [Z96.652]  Referring practitioner: NALDO Clay  Date of Initial Visit: Type: THERAPY  Noted: 2019  Today's Date: 2019  Patient seen for 2 sessions         John Jones reports: Stiffness in left knee this morning.  No new complaints.      Objective   See Exercise, Manual, and Modality Logs for complete treatment.       Assessment/Plan     Tolerates progression of exercise well this visit.  Patient demonstrate improving knee flexion AAROM (113 degrees).  Continues to have small restriction in knee extension (4 degrees).  Progressing well.     Progress per Plan of Care           Manual Therapy:    10     mins  88551;  Therapeutic Exercise:    30     mins  22975;     Neuromuscular Skeou:        mins  64649;    Therapeutic Activity:          mins  92672;     Gait Training:           mins  73540;     Ultrasound:          mins  15189;    Electrical Stimulation:         mins  01970 ( );  Dry Needling          mins self-pay    Timed Treatment:   40   mins   Total Treatment:     40   mins    Baltazar Rod PT  Physical Therapist

## 2019-12-11 ENCOUNTER — TREATMENT (OUTPATIENT)
Dept: PHYSICAL THERAPY | Facility: CLINIC | Age: 64
End: 2019-12-11

## 2019-12-11 DIAGNOSIS — Z96.652 S/P TKR (TOTAL KNEE REPLACEMENT), LEFT: Primary | ICD-10-CM

## 2019-12-11 PROCEDURE — 97110 THERAPEUTIC EXERCISES: CPT | Performed by: PHYSICAL THERAPIST

## 2019-12-11 PROCEDURE — 97140 MANUAL THERAPY 1/> REGIONS: CPT | Performed by: PHYSICAL THERAPIST

## 2019-12-11 NOTE — PROGRESS NOTES
Physical Therapy Daily Progress Note    Patient: John Jones   : 1955  Diagnosis/ICD-10 Code:  S/P TKR (total knee replacement), left [Z96.652]  Referring practitioner: NALDO Clay  Date of Initial Visit: Type: THERAPY  Noted: 2019  Today's Date: 2019  Patient seen for 3 sessions         John Jones reports:  Left knee feeling well today.  Continued stiffness around joint line and just below joint line.      Objective   See Exercise, Manual, and Modality Logs for complete treatment.       Assessment/Plan    Demonstrates 111 degrees of knee flexion, as well as full knee extension this visit.  Patient progressing well at this time.   Progress per Plan of Care           Manual Therapy:    10     mins  99443;  Therapeutic Exercise:    25     mins  31550;     Neuromuscular Sekou:        mins  78869;    Therapeutic Activity:          mins  81676;     Gait Training:           mins  67290;     Ultrasound:          mins  58627;    Electrical Stimulation:         mins  00406 ( );  Dry Needling          mins self-pay    Timed Treatment:   35   mins   Total Treatment:     35   mins    Baltazar Rod PT  Physical Therapist

## 2019-12-12 RX ORDER — LEVOTHYROXINE SODIUM 0.15 MG/1
TABLET ORAL
Qty: 90 TABLET | Refills: 3 | Status: SHIPPED | OUTPATIENT
Start: 2019-12-12 | End: 2020-02-04 | Stop reason: SDUPTHER

## 2019-12-12 RX ORDER — DOXAZOSIN MESYLATE 4 MG/1
TABLET ORAL
Qty: 90 TABLET | Refills: 3 | Status: SHIPPED | OUTPATIENT
Start: 2019-12-12 | End: 2020-02-04 | Stop reason: SDUPTHER

## 2019-12-16 ENCOUNTER — TREATMENT (OUTPATIENT)
Dept: PHYSICAL THERAPY | Facility: CLINIC | Age: 64
End: 2019-12-16

## 2019-12-16 DIAGNOSIS — Z96.652 S/P TKR (TOTAL KNEE REPLACEMENT), LEFT: Primary | ICD-10-CM

## 2019-12-16 PROCEDURE — 97110 THERAPEUTIC EXERCISES: CPT | Performed by: PHYSICAL THERAPIST

## 2019-12-16 PROCEDURE — 97140 MANUAL THERAPY 1/> REGIONS: CPT | Performed by: PHYSICAL THERAPIST

## 2019-12-16 NOTE — PROGRESS NOTES
Physical Therapy Daily Progress Note      Patient: John Jones   : 1955  Diagnosis/ICD-10 Code:  S/P TKR (total knee replacement), left [Z96.652]  Referring practitioner: NALDO Clay  Date of Initial Visit: Type: THERAPY  Noted: 2019  Today's Date: 2019  Patient seen for 4 sessions         John Jones reports:  Left knee continues to improve.  No new complaints.         Objective   See Exercise, Manual, and Modality Logs for complete treatment.       Assessment/Plan     Tolerates progression of exercise without reports of pain.  Patient demonstrates improved strength in BLE's during today's workout.  Patient nearing full extension and demonstrates 113 degrees of flexion AROM.  Progressing well.     Progress per Plan of Care and Progress strengthening /stabilization /functional activity           Manual Therapy:    10     mins  65035;  Therapeutic Exercise:    25     mins  17814;     Neuromuscular Sekou:        mins  88224;    Therapeutic Activity:          mins  81846;     Gait Training:           mins  03316;     Ultrasound:          mins  87384;    Electrical Stimulation:         mins  17842 ( );  Dry Needling          mins self-pay    Timed Treatment:   35   mins   Total Treatment:     35   mins    Baltazar Rod PT  Physical Therapist

## 2019-12-18 ENCOUNTER — TREATMENT (OUTPATIENT)
Dept: PHYSICAL THERAPY | Facility: CLINIC | Age: 64
End: 2019-12-18

## 2019-12-18 DIAGNOSIS — Z96.652 S/P TKR (TOTAL KNEE REPLACEMENT), LEFT: Primary | ICD-10-CM

## 2019-12-18 PROCEDURE — 97140 MANUAL THERAPY 1/> REGIONS: CPT | Performed by: PHYSICAL THERAPIST

## 2019-12-18 PROCEDURE — 97110 THERAPEUTIC EXERCISES: CPT | Performed by: PHYSICAL THERAPIST

## 2019-12-18 NOTE — PROGRESS NOTES
Physical Therapy Daily Progress Note      Patient: John Jones   : 1955  Diagnosis/ICD-10 Code:  S/P TKR (total knee replacement), left [Z96.652]  Referring practitioner: NALDO Clay  Date of Initial Visit: Type: THERAPY  Noted: 2019  Today's Date: 2019  Patient seen for 5 sessions         John Jones reports:  Left knee feels more stiff than painful.  Feeling better overall.     Objective   See Exercise, Manual, and Modality Logs for complete treatment.       Assessment/Plan     Tolerates treatment well this visit.  117 degrees of left knee flexion noted after manual therapy.  Patient progressing well.     Progress per Plan of Care           Manual Therapy:    10     mins  71479;  Therapeutic Exercise:    20     mins  37168;     Neuromuscular Sekou:        mins  85740;    Therapeutic Activity:          mins  73604;     Gait Training:           mins  08223;     Ultrasound:          mins  31705;    Electrical Stimulation:         mins  67209 ( );  Dry Needling          mins self-pay    Timed Treatment:   30   mins   Total Treatment:     45   mins    Baltazar Rod PT  Physical Therapist

## 2019-12-20 ENCOUNTER — TREATMENT (OUTPATIENT)
Dept: PHYSICAL THERAPY | Facility: CLINIC | Age: 64
End: 2019-12-20

## 2019-12-20 DIAGNOSIS — Z96.652 S/P TKR (TOTAL KNEE REPLACEMENT), LEFT: Primary | ICD-10-CM

## 2019-12-20 PROCEDURE — 97140 MANUAL THERAPY 1/> REGIONS: CPT | Performed by: PHYSICAL THERAPIST

## 2019-12-20 PROCEDURE — 97110 THERAPEUTIC EXERCISES: CPT | Performed by: PHYSICAL THERAPIST

## 2019-12-20 NOTE — PROGRESS NOTES
Physical Therapy Daily Progress Note      Patient: John Jones   : 1955  Diagnosis/ICD-10 Code:  S/P TKR (total knee replacement), left [Z96.652]  Referring practitioner: NALDO Clay  Date of Initial Visit: Type: THERAPY  Noted: 2019  Today's Date: 2019  Patient seen for 6 sessions         John Jones reports:  Left knee feeling stiff today.  No new complaints.     Objective   See Exercise, Manual, and Modality Logs for complete treatment.       Assessment/Plan     119 degrees of left knee flexion noted after manual therapy.  Patient progressing well and is ready to progress to strengthening phase.     Progress per Plan of Care           Manual Therapy:    10     mins  53964;  Therapeutic Exercise:    20     mins  55743;     Neuromuscular Sekou:        mins  41678;    Therapeutic Activity:          mins  39326;     Gait Training:           mins  51013;     Ultrasound:          mins  47054;    Electrical Stimulation:         mins  96849 ( );  Dry Needling          mins self-pay    Timed Treatment:   30   mins   Total Treatment:     30   mins    Baltazar Rod PT  Physical Therapist

## 2019-12-23 ENCOUNTER — TREATMENT (OUTPATIENT)
Dept: PHYSICAL THERAPY | Facility: CLINIC | Age: 64
End: 2019-12-23

## 2019-12-23 DIAGNOSIS — Z96.652 S/P TKR (TOTAL KNEE REPLACEMENT), LEFT: Primary | ICD-10-CM

## 2019-12-23 PROCEDURE — 97140 MANUAL THERAPY 1/> REGIONS: CPT | Performed by: PHYSICAL THERAPIST

## 2019-12-23 PROCEDURE — 97110 THERAPEUTIC EXERCISES: CPT | Performed by: PHYSICAL THERAPIST

## 2019-12-23 NOTE — PROGRESS NOTES
Physical Therapy Daily Progress Note      Patient: John Jones   : 1955  Diagnosis/ICD-10 Code:  S/P TKR (total knee replacement), left [Z96.652]  Referring practitioner: NALDO Clay  Date of Initial Visit: Type: THERAPY  Noted: 2019  Today's Date: 2019  Patient seen for 7 sessions         John Jones reports:  Left knee feeling well.  Not too sore today.     Objective   See Exercise, Manual, and Modality Logs for complete treatment.       Assessment/Plan     Tolerates manual therapy and exercise well.  Observed 120 degrees of left knee AA flexion this visit.  Patient tolerates progression to functional exercise well.     Progress per Plan of Care           Manual Therapy:    10     mins  48291;  Therapeutic Exercise:    30     mins  84788;     Neuromuscular Sekou:        mins  48184;    Therapeutic Activity:          mins  54513;     Gait Training:           mins  76490;     Ultrasound:          mins  41414;    Electrical Stimulation:         mins  58879 ( );  Dry Needling          mins self-pay    Timed Treatment:   40   mins   Total Treatment:     40   mins    Baltazar Rod PT  Physical Therapist

## 2019-12-26 ENCOUNTER — TREATMENT (OUTPATIENT)
Dept: PHYSICAL THERAPY | Facility: CLINIC | Age: 64
End: 2019-12-26

## 2019-12-26 DIAGNOSIS — Z96.652 S/P TKR (TOTAL KNEE REPLACEMENT), LEFT: Primary | ICD-10-CM

## 2019-12-26 PROCEDURE — 97110 THERAPEUTIC EXERCISES: CPT | Performed by: PHYSICAL THERAPIST

## 2019-12-26 PROCEDURE — 97112 NEUROMUSCULAR REEDUCATION: CPT | Performed by: PHYSICAL THERAPIST

## 2019-12-26 NOTE — PROGRESS NOTES
"   Physical Therapy Daily Progress Note      Patient: John Jones   : 1955  Diagnosis/ICD-10 Code:  S/P TKR (total knee replacement), left [Z96.652]  Referring practitioner: NALDO Clay  Date of Initial Visit: Type: THERAPY  Noted: 2019  Today's Date: 2019  Patient seen for 8 sessions         John Jones reports: Left knee felt well when he woke up today, however noticed that it started \"tightening up\" after walking up his stairs.       Objective   See Exercise, Manual, and Modality Logs for complete treatment.       Assessment/Plan     121 degrees of left knee flexion AROM noted prior to manual therapy this visit.  Patient tolerates progression of exercise well this visit.  Progressing well.     Progress per Plan of Care           Manual Therapy:         mins  23298;  Therapeutic Exercise:    20     mins  31160;     Neuromuscular Sekou:    10    mins  88893;    Therapeutic Activity:          mins  78644;     Gait Training:           mins  25750;     Ultrasound:          mins  81425;    Electrical Stimulation:         mins  35514 ( );  Dry Needling          mins self-pay    Timed Treatment:   30   mins   Total Treatment:     30   mins    Baltazar Rod PT  Physical Therapist                    "

## 2019-12-27 ENCOUNTER — TREATMENT (OUTPATIENT)
Dept: PHYSICAL THERAPY | Facility: CLINIC | Age: 64
End: 2019-12-27

## 2019-12-27 DIAGNOSIS — Z96.652 S/P TKR (TOTAL KNEE REPLACEMENT), LEFT: Primary | ICD-10-CM

## 2019-12-27 PROCEDURE — 97110 THERAPEUTIC EXERCISES: CPT | Performed by: PHYSICAL THERAPIST

## 2019-12-27 NOTE — PROGRESS NOTES
Physical Therapy Daily Progress Note    Patient: John Jones   : 1955  Diagnosis/ICD-10 Code:  S/P TKR (total knee replacement), left [Z96.652]  Referring practitioner: NALDO Clay  Date of Initial Visit: Type: THERAPY  Noted: 2019  Today's Date: 2019  Patient seen for 9 sessions         John Jones reports:  Left knee felt a little stiff when waking up today, however feels better now.      Objective   See Exercise, Manual, and Modality Logs for complete treatment.       Assessment/Plan     Patient maintains full AROM in left knee.  Patient progressing well with exercise at this time.     Progress per Plan of Care           Manual Therapy:         mins  29245;  Therapeutic Exercise:    30     mins  28413;     Neuromuscular Sekou:        mins  46089;    Therapeutic Activity:          mins  56877;     Gait Training:           mins  67407;     Ultrasound:          mins  68697;    Electrical Stimulation:         mins  99879 ( );  Dry Needling          mins self-pay    Timed Treatment:   30   mins   Total Treatment:     40   mins    Baltazar Rod PT  Physical Therapist

## 2019-12-30 ENCOUNTER — TREATMENT (OUTPATIENT)
Dept: PHYSICAL THERAPY | Facility: CLINIC | Age: 64
End: 2019-12-30

## 2019-12-30 DIAGNOSIS — Z96.652 S/P TKR (TOTAL KNEE REPLACEMENT), LEFT: Primary | ICD-10-CM

## 2019-12-30 PROCEDURE — 97110 THERAPEUTIC EXERCISES: CPT | Performed by: PHYSICAL THERAPIST

## 2019-12-30 NOTE — PROGRESS NOTES
Physical Therapy Daily Progress Note      Patient: John Jones   : 1955  Diagnosis/ICD-10 Code:  S/P TKR (total knee replacement), left [Z96.652]  Referring practitioner: NALDO Clay  Date of Initial Visit: Type: THERAPY  Noted: 2019  Today's Date: 2019  Patient seen for 10 sessions         John Jones reports: Left knee continues to feel well.  No new complaints.    Objective   See Exercise, Manual, and Modality Logs for complete treatment.       Assessment/Plan    Tolerates today's treatment well.  Patient demonstrates wnl gait mechanics and full AROM in left knee.  Patient progressing well towards I HEP.     Progress per Plan of Care           Manual Therapy:         mins  18962;  Therapeutic Exercise:    40     mins  61038;     Neuromuscular Sekou:        mins  72325;    Therapeutic Activity:          mins  77995;     Gait Training:           mins  30711;     Ultrasound:          mins  51513;    Electrical Stimulation:         mins  69377 (MC );  Dry Needling          mins self-pay    Timed Treatment:   40   mins   Total Treatment:     55   mins    Baltazar Rod PT  Physical Therapist

## 2019-12-31 ENCOUNTER — TREATMENT (OUTPATIENT)
Dept: PHYSICAL THERAPY | Facility: CLINIC | Age: 64
End: 2019-12-31

## 2019-12-31 DIAGNOSIS — Z96.652 S/P TKR (TOTAL KNEE REPLACEMENT), LEFT: Primary | ICD-10-CM

## 2019-12-31 PROCEDURE — 97110 THERAPEUTIC EXERCISES: CPT | Performed by: PHYSICAL THERAPIST

## 2019-12-31 NOTE — PROGRESS NOTES
Physical Therapy Daily Progress Note      Patient: John Jones   : 1955  Diagnosis/ICD-10 Code:  S/P TKR (total knee replacement), left [Z96.652]  Referring practitioner: NALDO Clay  Date of Initial Visit: Type: THERAPY  Noted: 2019  Today's Date: 2019  Patient seen for 11 sessions         John Jones reports:  Left knee continues to feel well.  Reports compliance with HEP.     Objective   See Exercise, Manual, and Modality Logs for complete treatment.       Assessment/Plan     In three weeks, patient will report at least 25% reduction in pain level.  (met)   In three weeks, patient will demonstrate equal bilateral knee flexion AROM.  (met)     In six weeks, patient will demonstrate 4+/5 muscular strength in BLE's.  (met)   In six weeks, patient will demonstrate at least 50% improvement in gait mechanics.  (met)  In six weeks, patient will demonstrate proper technique with HEP.  (met)  In six weeks, patient will demonstrate decreased perceived disability by increasing score on knee outcome survey by at least 15%  (met)     Continue with I HEP     Demonstrates meeting all physical therapy goals at this time.  Patient also demonstrates proper technique and safety with HEP.  Patient appropriate to continue rehabilitation with I HEP and be d/c from physical therapy.        Manual Therapy:         mins  48610;  Therapeutic Exercise:    25     mins  06276;     Neuromuscular Sekou:        mins  55397;    Therapeutic Activity:          mins  79182;     Gait Training:           mins  45905;     Ultrasound:          mins  68555;    Electrical Stimulation:         mins  29265 ( );  Dry Needling          mins self-pay    Timed Treatment:   25   mins   Total Treatment:     25   mins    Baltazar Rod PT  Physical Therapist

## 2020-01-06 ENCOUNTER — TELEPHONE (OUTPATIENT)
Dept: FAMILY MEDICINE CLINIC | Facility: CLINIC | Age: 65
End: 2020-01-06

## 2020-01-09 DIAGNOSIS — Z12.5 PROSTATE CANCER SCREENING: ICD-10-CM

## 2020-01-09 DIAGNOSIS — I10 HYPERTENSION, UNSPECIFIED TYPE: Primary | ICD-10-CM

## 2020-01-09 DIAGNOSIS — E03.9 HYPOTHYROIDISM, UNSPECIFIED TYPE: ICD-10-CM

## 2020-01-28 ENCOUNTER — LAB (OUTPATIENT)
Dept: FAMILY MEDICINE CLINIC | Facility: CLINIC | Age: 65
End: 2020-01-28

## 2020-01-28 DIAGNOSIS — E03.9 HYPOTHYROIDISM, UNSPECIFIED TYPE: ICD-10-CM

## 2020-01-28 DIAGNOSIS — Z12.5 PROSTATE CANCER SCREENING: ICD-10-CM

## 2020-01-28 DIAGNOSIS — I10 HYPERTENSION, UNSPECIFIED TYPE: ICD-10-CM

## 2020-01-28 LAB
ALBUMIN SERPL-MCNC: 4.1 G/DL (ref 3.5–5.2)
ALBUMIN/GLOB SERPL: 1.4 G/DL
ALP SERPL-CCNC: 66 U/L (ref 39–117)
ALT SERPL W P-5'-P-CCNC: 8 U/L (ref 1–41)
ANION GAP SERPL CALCULATED.3IONS-SCNC: 12.7 MMOL/L (ref 5–15)
AST SERPL-CCNC: 11 U/L (ref 1–40)
BASOPHILS # BLD AUTO: 0.03 10*3/MM3 (ref 0–0.2)
BASOPHILS NFR BLD AUTO: 0.5 % (ref 0–1.5)
BILIRUB SERPL-MCNC: 0.5 MG/DL (ref 0.2–1.2)
BILIRUB UR QL STRIP: NEGATIVE
BUN BLD-MCNC: 21 MG/DL (ref 8–23)
BUN/CREAT SERPL: 20.2 (ref 7–25)
CALCIUM SPEC-SCNC: 9.5 MG/DL (ref 8.6–10.5)
CHLORIDE SERPL-SCNC: 104 MMOL/L (ref 98–107)
CHOLEST SERPL-MCNC: 156 MG/DL (ref 0–200)
CLARITY UR: ABNORMAL
CO2 SERPL-SCNC: 26.3 MMOL/L (ref 22–29)
COLOR UR: YELLOW
CREAT BLD-MCNC: 1.04 MG/DL (ref 0.76–1.27)
DEPRECATED RDW RBC AUTO: 44.1 FL (ref 37–54)
EOSINOPHIL # BLD AUTO: 0.15 10*3/MM3 (ref 0–0.4)
EOSINOPHIL NFR BLD AUTO: 2.5 % (ref 0.3–6.2)
ERYTHROCYTE [DISTWIDTH] IN BLOOD BY AUTOMATED COUNT: 13 % (ref 12.3–15.4)
GFR SERPL CREATININE-BSD FRML MDRD: 72 ML/MIN/1.73
GLOBULIN UR ELPH-MCNC: 2.9 GM/DL
GLUCOSE BLD-MCNC: 94 MG/DL (ref 65–99)
GLUCOSE UR STRIP-MCNC: NEGATIVE MG/DL
HCT VFR BLD AUTO: 44.7 % (ref 37.5–51)
HDLC SERPL-MCNC: 62 MG/DL (ref 40–60)
HGB BLD-MCNC: 14.4 G/DL (ref 13–17.7)
HGB UR QL STRIP.AUTO: NEGATIVE
IMM GRANULOCYTES # BLD AUTO: 0.01 10*3/MM3 (ref 0–0.05)
IMM GRANULOCYTES NFR BLD AUTO: 0.2 % (ref 0–0.5)
KETONES UR QL STRIP: NEGATIVE
LDLC SERPL CALC-MCNC: 83 MG/DL (ref 0–100)
LDLC/HDLC SERPL: 1.34 {RATIO}
LEUKOCYTE ESTERASE UR QL STRIP.AUTO: NEGATIVE
LYMPHOCYTES # BLD AUTO: 1.14 10*3/MM3 (ref 0.7–3.1)
LYMPHOCYTES NFR BLD AUTO: 19 % (ref 19.6–45.3)
MCH RBC QN AUTO: 29.4 PG (ref 26.6–33)
MCHC RBC AUTO-ENTMCNC: 32.2 G/DL (ref 31.5–35.7)
MCV RBC AUTO: 91.2 FL (ref 79–97)
MONOCYTES # BLD AUTO: 0.51 10*3/MM3 (ref 0.1–0.9)
MONOCYTES NFR BLD AUTO: 8.5 % (ref 5–12)
NEUTROPHILS # BLD AUTO: 4.16 10*3/MM3 (ref 1.7–7)
NEUTROPHILS NFR BLD AUTO: 69.3 % (ref 42.7–76)
NITRITE UR QL STRIP: NEGATIVE
NRBC BLD AUTO-RTO: 0 /100 WBC (ref 0–0.2)
PH UR STRIP.AUTO: 7.5 [PH] (ref 5–8)
PLATELET # BLD AUTO: 324 10*3/MM3 (ref 140–450)
PMV BLD AUTO: 9.9 FL (ref 6–12)
POTASSIUM BLD-SCNC: 4.7 MMOL/L (ref 3.5–5.2)
PROT SERPL-MCNC: 7 G/DL (ref 6–8.5)
PROT UR QL STRIP: NEGATIVE
PSA SERPL-MCNC: 0.94 NG/ML (ref 0–4)
RBC # BLD AUTO: 4.9 10*6/MM3 (ref 4.14–5.8)
SODIUM BLD-SCNC: 143 MMOL/L (ref 136–145)
SP GR UR STRIP: 1.02 (ref 1–1.03)
TRIGL SERPL-MCNC: 55 MG/DL (ref 0–150)
TSH SERPL DL<=0.05 MIU/L-ACNC: 0.94 UIU/ML (ref 0.27–4.2)
UROBILINOGEN UR QL STRIP: ABNORMAL
VLDLC SERPL-MCNC: 11 MG/DL (ref 5–40)
WBC NRBC COR # BLD: 6 10*3/MM3 (ref 3.4–10.8)

## 2020-01-28 PROCEDURE — 80061 LIPID PANEL: CPT | Performed by: FAMILY MEDICINE

## 2020-01-28 PROCEDURE — 81003 URINALYSIS AUTO W/O SCOPE: CPT | Performed by: FAMILY MEDICINE

## 2020-01-28 PROCEDURE — 85025 COMPLETE CBC W/AUTO DIFF WBC: CPT | Performed by: FAMILY MEDICINE

## 2020-01-28 PROCEDURE — 84443 ASSAY THYROID STIM HORMONE: CPT | Performed by: FAMILY MEDICINE

## 2020-01-28 PROCEDURE — 36415 COLL VENOUS BLD VENIPUNCTURE: CPT

## 2020-01-28 PROCEDURE — 80053 COMPREHEN METABOLIC PANEL: CPT | Performed by: FAMILY MEDICINE

## 2020-01-28 PROCEDURE — G0103 PSA SCREENING: HCPCS | Performed by: FAMILY MEDICINE

## 2020-02-04 ENCOUNTER — OFFICE VISIT (OUTPATIENT)
Dept: FAMILY MEDICINE CLINIC | Facility: CLINIC | Age: 65
End: 2020-02-04

## 2020-02-04 VITALS
RESPIRATION RATE: 12 BRPM | WEIGHT: 176.2 LBS | SYSTOLIC BLOOD PRESSURE: 146 MMHG | HEART RATE: 78 BPM | BODY MASS INDEX: 24.57 KG/M2 | DIASTOLIC BLOOD PRESSURE: 87 MMHG

## 2020-02-04 DIAGNOSIS — Z00.00 WELCOME TO MEDICARE PREVENTIVE VISIT: Primary | ICD-10-CM

## 2020-02-04 DIAGNOSIS — Z23 VACCINE FOR STREPTOCOCCUS PNEUMONIAE AND INFLUENZA: ICD-10-CM

## 2020-02-04 DIAGNOSIS — I10 ESSENTIAL HYPERTENSION: ICD-10-CM

## 2020-02-04 PROCEDURE — G0403 EKG FOR INITIAL PREVENT EXAM: HCPCS | Performed by: FAMILY MEDICINE

## 2020-02-04 PROCEDURE — G0402 INITIAL PREVENTIVE EXAM: HCPCS | Performed by: FAMILY MEDICINE

## 2020-02-04 PROCEDURE — 90670 PCV13 VACCINE IM: CPT | Performed by: FAMILY MEDICINE

## 2020-02-04 PROCEDURE — G0009 ADMIN PNEUMOCOCCAL VACCINE: HCPCS | Performed by: FAMILY MEDICINE

## 2020-02-04 RX ORDER — AMLODIPINE BESYLATE 5 MG/1
TABLET ORAL
Qty: 180 TABLET | Refills: 3 | Status: SHIPPED | OUTPATIENT
Start: 2020-02-04 | End: 2021-05-11 | Stop reason: SDUPTHER

## 2020-02-04 RX ORDER — LEVOTHYROXINE SODIUM 0.15 MG/1
150 TABLET ORAL DAILY
Qty: 90 TABLET | Refills: 3 | Status: SHIPPED | OUTPATIENT
Start: 2020-02-04 | End: 2021-05-11 | Stop reason: SDUPTHER

## 2020-02-04 RX ORDER — DOXAZOSIN MESYLATE 4 MG/1
4 TABLET ORAL
Qty: 90 TABLET | Refills: 3 | Status: SHIPPED | OUTPATIENT
Start: 2020-02-04 | End: 2021-05-11 | Stop reason: SDUPTHER

## 2020-02-04 NOTE — PATIENT INSTRUCTIONS
Exercising to Stay Healthy  To become healthy and stay healthy, it is recommended that you do moderate-intensity and vigorous-intensity exercise. You can tell that you are exercising at a moderate intensity if your heart starts beating faster and you start breathing faster but can still hold a conversation. You can tell that you are exercising at a vigorous intensity if you are breathing much harder and faster and cannot hold a conversation while exercising.  Exercising regularly is important. It has many health benefits, such as:  · Improving overall fitness, flexibility, and endurance.  · Increasing bone density.  · Helping with weight control.  · Decreasing body fat.  · Increasing muscle strength.  · Reducing stress and tension.  · Improving overall health.  How often should I exercise?  Choose an activity that you enjoy, and set realistic goals. Your health care provider can help you make an activity plan that works for you.  Exercise regularly as told by your health care provider. This may include:  · Doing strength training two times a week, such as:  ? Lifting weights.  ? Using resistance bands.  ? Push-ups.  ? Sit-ups.  ? Yoga.  · Doing a certain intensity of exercise for a given amount of time. Choose from these options:  ? A total of 150 minutes of moderate-intensity exercise every week.  ? A total of 75 minutes of vigorous-intensity exercise every week.  ? A mix of moderate-intensity and vigorous-intensity exercise every week.  Children, pregnant women, people who have not exercised regularly, people who are overweight, and older adults may need to talk with a health care provider about what activities are safe to do. If you have a medical condition, be sure to talk with your health care provider before you start a new exercise program.  What are some exercise ideas?  Moderate-intensity exercise ideas include:  · Walking 1 mile (1.6 km) in about 15  minutes.  · Biking.  · Hiking.  · Golfing.  · Dancing.  · Water aerobics.  Vigorous-intensity exercise ideas include:  · Walking 4.5 miles (7.2 km) or more in about 1 hour.  · Jogging or running 5 miles (8 km) in about 1 hour.  · Biking 10 miles (16.1 km) or more in about 1 hour.  · Lap swimming.  · Roller-skating or in-line skating.  · Cross-country skiing.  · Vigorous competitive sports, such as football, basketball, and soccer.  · Jumping rope.  · Aerobic dancing.  What are some everyday activities that can help me to get exercise?  · Yard work, such as:  ? Pushing a .  ? Raking and bagging leaves.  · Washing your car.  · Pushing a stroller.  · Shoveling snow.  · Gardening.  · Washing windows or floors.  How can I be more active in my day-to-day activities?  · Use stairs instead of an elevator.  · Take a walk during your lunch break.  · If you drive, park your car farther away from your work or school.  · If you take public transportation, get off one stop early and walk the rest of the way.  · Stand up or walk around during all of your indoor phone calls.  · Get up, stretch, and walk around every 30 minutes throughout the day.  · Enjoy exercise with a friend. Support to continue exercising will help you keep a regular routine of activity.  What guidelines can I follow while exercising?  · Before you start a new exercise program, talk with your health care provider.  · Do not exercise so much that you hurt yourself, feel dizzy, or get very short of breath.  · Wear comfortable clothes and wear shoes with good support.  · Drink plenty of water while you exercise to prevent dehydration or heat stroke.  · Work out until your breathing and your heartbeat get faster.  Where to find more information  · [place] Department of Health and Human Services: www.hhs.gov  · Centers for Disease Control and Prevention (CDC): www.cdc.gov  Summary  · Exercising regularly is important. It will improve your overall fitness,  "flexibility, and endurance.  · Regular exercise also will improve your overall health. It can help you control your weight, reduce stress, and improve your bone density.  · Do not exercise so much that you hurt yourself, feel dizzy, or get very short of breath.  · Before you start a new exercise program, talk with your health care provider.  This information is not intended to replace advice given to you by your health care provider. Make sure you discuss any questions you have with your health care provider.  Document Released: 01/20/2012 Document Revised: 11/08/2018 Document Reviewed: 11/08/2018  [employer] Interactive Patient Education © 2019 [employer]      Hypertension, Adult  High blood pressure (hypertension) is when the force of blood pumping through the arteries is too strong. The arteries are the blood vessels that carry blood from the heart throughout the body. Hypertension forces the heart to work harder to pump blood and may cause arteries to become narrow or stiff. Untreated or uncontrolled hypertension can cause a heart attack, heart failure, a stroke, kidney disease, and other problems.  A blood pressure reading consists of a higher number over a lower number. Ideally, your blood pressure should be below 120/80. The first (\"top\") number is called the systolic pressure. It is a measure of the pressure in your arteries as your heart beats. The second (\"bottom\") number is called the diastolic pressure. It is a measure of the pressure in your arteries as the heart relaxes.  What are the causes?  The exact cause of this condition is not known. There are some conditions that result in or are related to high blood pressure.  What increases the risk?  Some risk factors for high blood pressure are under your control. The following factors may make you more likely to develop this condition:  · Smoking.  · Having type 2 diabetes mellitus, high cholesterol, or both.  · Not getting enough exercise or physical " activity.  · Being overweight.  · Having too much fat, sugar, calories, or salt (sodium) in your diet.  · Drinking too much alcohol.  Some risk factors for high blood pressure may be difficult or impossible to change. Some of these factors include:  · Having chronic kidney disease.  · Having a family history of high blood pressure.  · Age. Risk increases with age.  · Race. You may be at higher risk if you are .  · Gender. Men are at higher risk than women before age 45. After age 65, women are at higher risk than men.  · Having obstructive sleep apnea.  · Stress.  What are the signs or symptoms?  High blood pressure may not cause symptoms. Very high blood pressure (hypertensive crisis) may cause:  · Headache.  · Anxiety.  · Shortness of breath.  · Nosebleed.  · Nausea and vomiting.  · Vision changes.  · Severe chest pain.  · Seizures.  How is this diagnosed?  This condition is diagnosed by measuring your blood pressure while you are seated, with your arm resting on a flat surface, your legs uncrossed, and your feet flat on the floor. The cuff of the blood pressure monitor will be placed directly against the skin of your upper arm at the level of your heart. It should be measured at least twice using the same arm. Certain conditions can cause a difference in blood pressure between your right and left arms.  Certain factors can cause blood pressure readings to be lower or higher than normal for a short period of time:  · When your blood pressure is higher when you are in a health care provider's office than when you are at home, this is called white coat hypertension. Most people with this condition do not need medicines.  · When your blood pressure is higher at home than when you are in a health care provider's office, this is called masked hypertension. Most people with this condition may need medicines to control blood pressure.  If you have a high blood pressure reading during one visit or you have  normal blood pressure with other risk factors, you may be asked to:  · Return on a different day to have your blood pressure checked again.  · Monitor your blood pressure at home for 1 week or longer.  If you are diagnosed with hypertension, you may have other blood or imaging tests to help your health care provider understand your overall risk for other conditions.  How is this treated?  This condition is treated by making healthy lifestyle changes, such as eating healthy foods, exercising more, and reducing your alcohol intake. Your health care provider may prescribe medicine if lifestyle changes are not enough to get your blood pressure under control, and if:  · Your systolic blood pressure is above 130.  · Your diastolic blood pressure is above 80.  Your personal target blood pressure may vary depending on your medical conditions, your age, and other factors.  Follow these instructions at home:  Eating and drinking    · Eat a diet that is high in fiber and potassium, and low in sodium, added sugar, and fat. An example eating plan is called the DASH (Dietary Approaches to Stop Hypertension) diet. To eat this way:  ? Eat plenty of fresh fruits and vegetables. Try to fill one half of your plate at each meal with fruits and vegetables.  ? Eat whole grains, such as whole-wheat pasta, brown rice, or whole-grain bread. Fill about one fourth of your plate with whole grains.  ? Eat or drink low-fat dairy products, such as skim milk or low-fat yogurt.  ? Avoid fatty cuts of meat, processed or cured meats, and poultry with skin. Fill about one fourth of your plate with lean proteins, such as fish, chicken without skin, beans, eggs, or tofu.  ? Avoid pre-made and processed foods. These tend to be higher in sodium, added sugar, and fat.  · Reduce your daily sodium intake. Most people with hypertension should eat less than 1,500 mg of sodium a day.  · Do not drink alcohol if:  ? Your health care provider tells you not to  drink.  ? You are pregnant, may be pregnant, or are planning to become pregnant.  · If you drink alcohol:  ? Limit how much you use to:  § 0-1 drink a day for women.  § 0-2 drinks a day for men.  ? Be aware of how much alcohol is in your drink. In the [place] one drink equals one 12 oz bottle of beer (355 mL), one 5 oz glass of wine (148 mL), or one 1½ oz glass of hard liquor (44 mL).  Lifestyle    · Work with your health care provider to maintain a healthy body weight or to lose weight. Ask what an ideal weight is for you.  · Get at least 30 minutes of exercise most days of the week. Activities may include walking, swimming, or biking.  · Include exercise to strengthen your muscles (resistance exercise), such as Pilates or lifting weights, as part of your weekly exercise routine. Try to do these types of exercises for 30 minutes at least 3 days a week.  · Do not use any products that contain nicotine or tobacco, such as cigarettes, e-cigarettes, and chewing tobacco. If you need help quitting, ask your health care provider.  · Monitor your blood pressure at home as told by your health care provider.  · Keep all follow-up visits as told by your health care provider. This is important.  Medicines  · Take over-the-counter and prescription medicines only as told by your health care provider. Follow directions carefully. Blood pressure medicines must be taken as prescribed.  · Do not skip doses of blood pressure medicine. Doing this puts you at risk for problems and can make the medicine less effective.  · Ask your health care provider about side effects or reactions to medicines that you should watch for.  Contact a health care provider if you:  · Think you are having a reaction to a medicine you are taking.  · Have headaches that keep coming back (recurring).  · Feel dizzy.  · Have swelling in your ankles.  · Have trouble with your vision.  Get help right away if you:  · Develop a severe headache or confusion.  · Have  unusual weakness or numbness.  · Feel faint.  · Have severe pain in your chest or abdomen.  · Vomit repeatedly.  · Have trouble breathing.  Summary  · Hypertension is when the force of blood pumping through your arteries is too strong. If this condition is not controlled, it may put you at risk for serious complications.  · Your personal target blood pressure may vary depending on your medical conditions, your age, and other factors. For most people, a normal blood pressure is less than 120/80.  · Hypertension is treated with lifestyle changes, medicines, or a combination of both. Lifestyle changes include losing weight, eating a healthy, low-sodium diet, exercising more, and limiting alcohol.  This information is not intended to replace advice given to you by your health care provider. Make sure you discuss any questions you have with your health care provider.  Document Released: 12/18/2006 Document Revised: 08/28/2019 Document Reviewed: 08/28/2019  [employer] Interactive Patient Education © 2019 [employer]

## 2020-02-04 NOTE — PROGRESS NOTES
The ABCs of the Annual Wellness Visit  Welcome to Medicare Visit    Chief Complaint   Patient presents with   • Welcome To Medicare       Subjective   History of Present Illness:  John Jones is a 64 y.o. male who presents for a  Welcome to Medicare Visit.  The patient has not been exercising as much because he had surgery in April 2019 on his neck, he pretty much lost all use of his left arm after the surgery became weak and he had to do physical therapy and now he is doing his own therapy and lifting some weights and he has full range of motion of his arm now but he still has weakness in the left arm.  He continues to exercise to try to improve this.  Fortunately he is left-handed and has had difficulty writing and doing certain task.  The patient also has had both of his knees operated on, total knee replacements bilaterally.  He is feeling like these are much improved.  He denies any chest pain syncope or dyspnea on exertion.  He denies any issues with his bowel or bladder function, has no blood in his stool or urine.  He has no issues with his memory and he has takes care of his own financials ADLs and transportation.    HEALTH RISK ASSESSMENT    Recent Hospitalizations:  Recently treated at the following:  [facility]    Current Medical Providers:  Patient Care Team:  Diego Benavidez MD as PCP - General (Family Medicine)  Diego Benavidez MD as PCP - Family Medicine    Smoking Status:  Social History     Tobacco Use   Smoking Status Never Smoker   Smokeless Tobacco Never Used       Alcohol Consumption:  Social History     Substance and Sexual Activity   Alcohol Use Yes   • Frequency: 2-4 times a month   • Drinks per session: 3 or 4   • Binge frequency: Less than monthly    Comment: SOCIALLY       Depression Screen:   PHQ-2/PHQ-9 Depression Screening 2/4/2020   Little interest or pleasure in doing things 0   Feeling down, depressed, or hopeless 0   Total Score 0       Fall Risk Screen:  BRAYDON  Fall Risk Assessment was completed, and patient is at LOW risk for falls.Assessment completed on:2/4/2020    Health Habits and Functional and Cognitive Screening:  Functional & Cognitive Status 2/4/2020   Do you have difficulty preparing food and eating? No   Do you have difficulty bathing yourself, getting dressed or grooming yourself? No   Do you have difficulty using the toilet? No   Do you have difficulty moving around from place to place? No   Do you have trouble with steps or getting out of a bed or a chair? No   Current Diet Well Balanced Diet   Dental Exam Up to date   Eye Exam Up to date   Exercise (times per week) 7 times per week   Current Exercise Activities Include Light Weight/Kettebells   Do you need help using the phone?  No   Are you deaf or do you have serious difficulty hearing?  No   Do you need help with transportation? No   Do you need help shopping? No   Do you need help preparing meals?  No   Do you need help with housework?  No   Do you need help with laundry? No   Do you need help taking your medications? No   Do you need help managing money? No   Do you ever drive or ride in a car without wearing a seat belt? No   Have you felt unusual stress, anger or loneliness in the last month? No   Who do you live with? Spouse   If you need help, do you have trouble finding someone available to you? No   Do you have difficulty concentrating, remembering or making decisions? No         Does the patient have evidence of cognitive impairment? No    Asprin use counseling:Taking ASA appropriately as indicated    Visual Acuity:    No exam data present    Age-appropriate Screening Schedule:  Refer to the list below for future screening recommendations based on patient's age, sex and/or medical conditions. Orders for these recommended tests are listed in the plan section. The patient has been provided with a written plan.    Health Maintenance   Topic Date Due   • TDAP/TD VACCINES (2 - Td) 12/11/2025   •  COLONOSCOPY  04/12/2027   • INFLUENZA VACCINE  Completed   • ZOSTER VACCINE  Completed          The following portions of the patient's history were reviewed and updated as appropriate: allergies, current medications, past family history, past medical history, past social history, past surgical history and problem list.    Outpatient Medications Prior to Visit   Medication Sig Dispense Refill   • aspirin 81 MG tablet Take 81 mg by mouth Daily. PT TO HOLD FOR SURGERY     • amLODIPine (NORVASC) 5 MG tablet TAKE 1 TABLET BY MOUTH EVERY DAY 90 tablet 3   • doxazosin (CARDURA) 4 MG tablet TAKE 1 TABLET BY MOUTH EVERY DAY AT BEDTIME 90 tablet 3   • levothyroxine (SYNTHROID, LEVOTHROID) 150 MCG tablet TAKE 1 TABLET BY MOUTH EVERY DAY 90 tablet 3   • meloxicam (MOBIC) 15 MG tablet TAKE 1 TABLET BY MOUTH DAILY 90 tablet 3     No facility-administered medications prior to visit.        Patient Active Problem List   Diagnosis   • Cervical radiculopathy   • Muscle left arm weakness   • Welcome to Medicare preventive visit   • Essential hypertension       Advanced Care Planning:  ACP discussion was held with the patient during this visit. Patient does not have an advance directive, information provided.    Review of Systems   Constitutional: Negative for chills, fatigue and fever.   HENT: Negative for nosebleeds.    Respiratory: Negative for chest tightness and shortness of breath.    Cardiovascular: Negative for chest pain and palpitations.   Gastrointestinal: Negative for abdominal pain and blood in stool.   Genitourinary: Negative for dysuria and hematuria.   Neurological: Negative for dizziness and syncope.   Psychiatric/Behavioral: Negative for confusion.       Compared to one year ago, the patient feels his physical health is better.  Compared to one year ago, the patient feels his mental health is the same.    Reviewed chart for potential of high risk medication in the elderly: yes  Reviewed chart for potential of harmful  drug interactions in the elderly:yes    Objective         Vitals:    02/04/20 1550   BP: 146/87   BP Location: Left arm   Patient Position: Sitting   Cuff Size: Adult   Pulse: 78   Resp: 12   Weight: 79.9 kg (176 lb 3.2 oz)   PainSc: 0-No pain       Body mass index is 24.57 kg/m².  Discussed the patient's BMI with him. The BMI is in the acceptable range.    Physical Exam   Constitutional: He is oriented to person, place, and time. He appears well-developed and well-nourished. No distress.   HENT:   Head: Normocephalic and atraumatic.   Nose: Nose normal.   Mouth/Throat: Oropharynx is clear and moist.   Eyes: Pupils are equal, round, and reactive to light. Conjunctivae, EOM and lids are normal.   Neck: Normal range of motion. Neck supple. No JVD present. Carotid bruit is not present. No thyroid mass and no thyromegaly present.   Cardiovascular: Normal rate, regular rhythm, normal heart sounds and intact distal pulses.   Pulmonary/Chest: Effort normal and breath sounds normal.   Abdominal: Soft. Bowel sounds are normal. He exhibits no mass. There is no tenderness. There is no guarding.   Genitourinary: Rectum normal.   Genitourinary Comments: Enlarged prostate   Musculoskeletal:   No clubbing cyanosis or edema, DP pulses are palpable  He has some muscle atrophy of his left arm and hand.  He is some weakness in his left arm compared to the right   Neurological: He is alert and oriented to person, place, and time.   Skin: Skin is warm and dry.   Psychiatric: He has a normal mood and affect. His speech is normal and behavior is normal. He is attentive.   Nursing note and vitals reviewed.      Lab Results   Component Value Date    TRIG 55 01/28/2020    HDL 62 (H) 01/28/2020    LDL 83 01/28/2020    VLDL 11 01/28/2020          ECG 12 Lead  Date/Time: 2/4/2020 4:23 PM  Performed by: Diego Benavidez MD  Authorized by: Diego Benavidez MD   Comparison: not compared with previous ECG   Previous ECG: no previous ECG  available  Rhythm: sinus rhythm  Rate: normal  Conduction: conduction normal  ST Segments: ST segments normal  T Waves: T waves normal  QRS axis: normal  Other: no other findings    Clinical impression: normal ECG              Assessment/Plan   Medicare Risks and Personalized Health Plan  CMS Preventative Services Quick Reference  Advance Directive Discussion  Colon Cancer Screening  Dementia/Memory   Depression/Dysphoria  Fall Risk  Glaucoma Risk  Immunizations Discussed/Encouraged (specific immunizations; adacel Tdap, Influenza, Pneumococcal 23, Prevnar and Shingrix )  Inadequate Social Support, Isolation, Loneliness, Lack of Transportation, Financial Difficulties, or Caregiver Stress   Inactivity/Sedentary  Prostate Cancer Screening     The above risks/problems have been discussed with the patient.  Pertinent information has been shared with the patient in the After Visit Summary.  Follow up plans and orders are seen below in the Assessment/Plan Section.    Diagnoses and all orders for this visit:    1. Welcome to Medicare preventive visit (Primary)  Assessment & Plan:  Regular aerobic exercise for cardiovascular health, continue weight training for muscle toning and strengthening and strong bones.  Nutritious diet high in fiber low in carbohydrate, eating leaner cuts of meat and higher amounts of fish.  Immunizations were discussed with the patient as well    Orders:  -     ECG 12 Lead    2. Vaccine for streptococcus pneumoniae and influenza  -     Discontinue: pneumococcal conj. 13-valent (PREVNAR-13) vaccine 0.5 mL  -     Pneumococcal Conjugate Vaccine 13-Valent All (PCV13)    3. Essential hypertension  Assessment & Plan:  Worse  Crease Norvasc to 5 mg twice daily  Buy an [employer] blood pressure cuff that is not wrist      Other orders  -     amLODIPine (NORVASC) 5 MG tablet; 1 po bid  Dispense: 180 tablet; Refill: 3  -     doxazosin (CARDURA) 4 MG tablet; Take 1 tablet by mouth every night at bedtime.  Dispense:  90 tablet; Refill: 3  -     levothyroxine (SYNTHROID, LEVOTHROID) 150 MCG tablet; Take 1 tablet by mouth Daily.  Dispense: 90 tablet; Refill: 3      Follow Up:  Return in about 3 months (around 5/4/2020) for Recheck.     An After Visit Summary and PPPS were given to the patient.    Advance Care Planning   ACP discussion was held with the patient during this visit.

## 2020-02-05 NOTE — ASSESSMENT & PLAN NOTE
Regular aerobic exercise for cardiovascular health, continue weight training for muscle toning and strengthening and strong bones.  Nutritious diet high in fiber low in carbohydrate, eating leaner cuts of meat and higher amounts of fish.  Immunizations were discussed with the patient as well

## 2020-05-06 ENCOUNTER — TELEPHONE (OUTPATIENT)
Dept: NEUROSURGERY | Facility: CLINIC | Age: 65
End: 2020-05-06

## 2020-05-08 ENCOUNTER — TELEPHONE (OUTPATIENT)
Dept: NEUROSURGERY | Facility: CLINIC | Age: 65
End: 2020-05-08

## 2020-05-08 ENCOUNTER — OFFICE VISIT (OUTPATIENT)
Dept: FAMILY MEDICINE CLINIC | Facility: CLINIC | Age: 65
End: 2020-05-08

## 2020-05-08 VITALS
RESPIRATION RATE: 12 BRPM | WEIGHT: 175.8 LBS | DIASTOLIC BLOOD PRESSURE: 70 MMHG | BODY MASS INDEX: 24.52 KG/M2 | SYSTOLIC BLOOD PRESSURE: 120 MMHG | HEART RATE: 78 BPM

## 2020-05-08 DIAGNOSIS — I10 ESSENTIAL HYPERTENSION: Primary | ICD-10-CM

## 2020-05-08 DIAGNOSIS — I10 WHITE COAT SYNDROME WITH HYPERTENSION: ICD-10-CM

## 2020-05-08 PROCEDURE — 99213 OFFICE O/P EST LOW 20 MIN: CPT | Performed by: FAMILY MEDICINE

## 2020-05-08 NOTE — TELEPHONE ENCOUNTER
Called to remind patient to get their XR done before their appointment with Dr. Phoenix on 5/12/2020. Their appointment is scheduled as a Telephone visit.

## 2020-05-08 NOTE — ASSESSMENT & PLAN NOTE
The patient's blood pressure is normal at home and here it is high, we will be using his home readings so he will need to check this on occasion and before his next appointment.

## 2020-05-08 NOTE — PROGRESS NOTES
Subjective   History of Present Illness: John Jones is a 64 y.o. male is here today for follow-up via Telephone Visit with a new Cervical XR. Mr. Jones is now 1 year and 6 days out from a C5-6, C6-7 ACDF done on 5/6/2019.    You have chosen to receive care through a telephone visit. Do you consent to use a telephone visit for your medical care today? Yes     I talked to this patient today on the phone because we could not arrange a video visit.  He was at home and I was in the office and we talked for 5 minutes.    History of Present Illness     The patient says that he feels pretty good overall.  He really does not have much in the way of pain.  He feels that he is got all of his range of motion back.    The following portions of the patient's history were reviewed and updated as appropriate: allergies, current medications, past family history, past medical history, past social history, past surgical history and problem list.    Review of Systems   Respiratory: Negative for chest tightness and shortness of breath.    Cardiovascular: Negative for chest pain.   Musculoskeletal: Positive for neck pain.   All other systems reviewed and are negative.      Objective         Physical Exam   Constitutional: He is oriented to person, place, and time.   Neurological: He is oriented to person, place, and time.     Neurologic Exam     Mental Status   Oriented to person, place, and time.           Assessment/Plan   Independent Review of Radiographic Studies:      I personally reviewed the images from the following studies.    I reviewed his x-rays which were done yesterday at [facility].  This shows good alignment at C5-6 and C6-7 with good early fusion at those levels.    Medical Decision Making:      I told the patient that from my point of view he can return to normal activities.  He is to call me if any problems develop.    There are no diagnoses linked to this encounter.  No follow-ups on file.

## 2020-05-11 ENCOUNTER — HOSPITAL ENCOUNTER (OUTPATIENT)
Dept: GENERAL RADIOLOGY | Facility: HOSPITAL | Age: 65
Discharge: HOME | End: 2020-05-11
Admitting: NEUROLOGICAL SURGERY

## 2020-05-11 DIAGNOSIS — M54.12 CERVICAL RADICULOPATHY: ICD-10-CM

## 2020-05-11 PROCEDURE — 72052 X-RAY EXAM NECK SPINE 6/>VWS: CPT

## 2020-05-12 ENCOUNTER — OFFICE VISIT (OUTPATIENT)
Dept: NEUROSURGERY | Facility: CLINIC | Age: 65
End: 2020-05-12

## 2020-05-12 DIAGNOSIS — M54.12 CERVICAL RADICULOPATHY: Primary | ICD-10-CM

## 2020-05-12 PROCEDURE — 99441 PR PHYS/QHP TELEPHONE EVALUATION 5-10 MIN: CPT | Performed by: NEUROLOGICAL SURGERY

## 2020-07-10 ENCOUNTER — OFFICE VISIT (OUTPATIENT)
Dept: FAMILY MEDICINE CLINIC | Facility: CLINIC | Age: 65
End: 2020-07-10

## 2020-07-10 VITALS
SYSTOLIC BLOOD PRESSURE: 145 MMHG | TEMPERATURE: 98.6 F | WEIGHT: 170 LBS | RESPIRATION RATE: 10 BRPM | HEART RATE: 73 BPM | BODY MASS INDEX: 23.71 KG/M2 | DIASTOLIC BLOOD PRESSURE: 76 MMHG

## 2020-07-10 DIAGNOSIS — L75.1 CHROMHIDROSIS: Primary | ICD-10-CM

## 2020-07-10 PROCEDURE — 99213 OFFICE O/P EST LOW 20 MIN: CPT | Performed by: FAMILY MEDICINE

## 2020-07-10 NOTE — PROGRESS NOTES
Rooming Tab(CC,VS,Pt Hx,Fall Screen)  Chief Complaint   Patient presents with   • Excessive Sweating       Subjective Patient is a 64-year-old here complaining of sweating yellowish colored green under his arms and on his abdomen and under his chest.  This is been going on for 2 weeks.  He is had no new medications.  He denies any eating of excess green vegetables.  He really has no other symptoms.  He has no chest pain or shortness of breath or dizziness or syncope.    I have reviewed and updated his medications, medical history and problem list during today's office visit.     Patient Care Team:  Diego Benavidez MD as PCP - General (Family Medicine)  Diego Benavidez MD as PCP - Family Medicine    Problem List Tab  Medications Tab  Synopsis Tab  Chart Review Tab  Care Everywhere Tab  Immunizations Tab  Patient History Tab    Social History     Tobacco Use   • Smoking status: Never Smoker   • Smokeless tobacco: Never Used   Substance Use Topics   • Alcohol use: Yes     Frequency: 2-4 times a month     Drinks per session: 3 or 4     Binge frequency: Less than monthly     Comment: SOCIALLY       Review of Systems   Constitutional: Negative for chills, fatigue and fever.   HENT: Negative for nosebleeds.    Eyes: Negative for double vision.   Respiratory: Negative for chest tightness and shortness of breath.    Cardiovascular: Negative for chest pain and palpitations.   Gastrointestinal: Negative for blood in stool.   Genitourinary: Negative for dysuria and hematuria.   Neurological: Negative for dizziness and syncope.   Psychiatric/Behavioral: Negative for depressed mood.       Objective     Rooming Tab(CC,VS,Pt Hx,Fall Screen)  /76   Pulse 73   Temp 98.6 °F (37 °C)   Resp 10   Wt 77.1 kg (170 lb)   BMI 23.71 kg/m²     Body mass index is 23.71 kg/m².    Physical Exam   Constitutional: He is oriented to person, place, and time. He appears well-developed and well-nourished. No distress.   HENT:   Head:  Normocephalic and atraumatic.   Nose: Nose normal.   Mouth/Throat: Oropharynx is clear and moist.   Eyes: Pupils are equal, round, and reactive to light. Conjunctivae, EOM and lids are normal.   Neck: Trachea normal and normal range of motion. Neck supple. No JVD present. Carotid bruit is not present. No thyroid mass and no thyromegaly present.   No carotid bruits   Cardiovascular: Normal rate, regular rhythm, normal heart sounds and intact distal pulses.   Pulmonary/Chest: Effort normal and breath sounds normal.   Musculoskeletal:   No c/c/e   Neurological: He is alert and oriented to person, place, and time. No cranial nerve deficit.   Skin: Skin is warm and dry.   Psychiatric: He has a normal mood and affect. His speech is normal and behavior is normal. He is attentive.   Nursing note and vitals reviewed.       Statin Choice Calculator  Data Reviewed:                   Assessment/Plan   Order Review Tab  Health Maintenance Tab  Patient Plan/Order Tab  Diagnoses and all orders for this visit:    1. Chromhidrosis (Primary)  Assessment & Plan:  Reassurance.  Would avoid cranberry juice.  Follow-up as needed.        Wrapup Tab  Return if symptoms worsen or fail to improve, for Next scheduled follow up.

## 2020-07-12 PROBLEM — L75.1 CHROMHIDROSIS: Status: ACTIVE | Noted: 2020-07-12

## 2021-01-19 PROBLEM — M75.21 BICEPS TENDINITIS, RIGHT: Status: ACTIVE | Noted: 2018-08-17

## 2021-01-19 PROBLEM — M54.2 NECK PAIN: Status: ACTIVE | Noted: 2019-03-04

## 2021-01-19 PROBLEM — M75.51 SUBACROMIAL BURSITIS OF RIGHT SHOULDER JOINT: Status: ACTIVE | Noted: 2018-08-08

## 2021-01-19 PROBLEM — M25.519 SHOULDER PAIN: Status: ACTIVE | Noted: 2019-03-04

## 2021-01-19 PROBLEM — R21 RASH: Status: ACTIVE | Noted: 2019-05-24

## 2021-01-19 PROBLEM — M75.80 ROTATOR CUFF TENDONITIS: Status: ACTIVE | Noted: 2017-02-27

## 2021-01-19 PROBLEM — Z12.5 PROSTATE CANCER SCREENING: Status: ACTIVE | Noted: 2019-02-27

## 2021-02-11 ENCOUNTER — LAB (OUTPATIENT)
Dept: FAMILY MEDICINE CLINIC | Facility: CLINIC | Age: 66
End: 2021-02-11

## 2021-02-11 DIAGNOSIS — E03.9 HYPOTHYROIDISM, UNSPECIFIED TYPE: ICD-10-CM

## 2021-02-11 DIAGNOSIS — Z12.5 SCREENING FOR PROSTATE CANCER: ICD-10-CM

## 2021-02-11 DIAGNOSIS — E78.5 HYPERLIPIDEMIA, UNSPECIFIED HYPERLIPIDEMIA TYPE: Primary | ICD-10-CM

## 2021-02-11 DIAGNOSIS — I15.9 SECONDARY HYPERTENSION: ICD-10-CM

## 2021-02-11 LAB
ALBUMIN SERPL-MCNC: 4.3 G/DL (ref 3.5–5.2)
ALBUMIN/GLOB SERPL: 1.8 G/DL
ALP SERPL-CCNC: 71 U/L (ref 39–117)
ALT SERPL W P-5'-P-CCNC: 17 U/L (ref 1–41)
ANION GAP SERPL CALCULATED.3IONS-SCNC: 9 MMOL/L (ref 5–15)
AST SERPL-CCNC: 19 U/L (ref 1–40)
BASOPHILS # BLD AUTO: 0.05 10*3/MM3 (ref 0–0.2)
BASOPHILS NFR BLD AUTO: 0.9 % (ref 0–1.5)
BILIRUB SERPL-MCNC: 0.5 MG/DL (ref 0–1.2)
BILIRUB UR QL STRIP: NEGATIVE
BUN SERPL-MCNC: 26 MG/DL (ref 8–23)
BUN/CREAT SERPL: 28.3 (ref 7–25)
CALCIUM SPEC-SCNC: 9.8 MG/DL (ref 8.6–10.5)
CHLORIDE SERPL-SCNC: 104 MMOL/L (ref 98–107)
CHOLEST SERPL-MCNC: 171 MG/DL (ref 0–200)
CLARITY UR: CLEAR
CO2 SERPL-SCNC: 28 MMOL/L (ref 22–29)
COLOR UR: YELLOW
CREAT SERPL-MCNC: 0.92 MG/DL (ref 0.76–1.27)
DEPRECATED RDW RBC AUTO: 43.2 FL (ref 37–54)
EOSINOPHIL # BLD AUTO: 0.24 10*3/MM3 (ref 0–0.4)
EOSINOPHIL NFR BLD AUTO: 4.5 % (ref 0.3–6.2)
ERYTHROCYTE [DISTWIDTH] IN BLOOD BY AUTOMATED COUNT: 12.9 % (ref 12.3–15.4)
GFR SERPL CREATININE-BSD FRML MDRD: 82 ML/MIN/1.73
GLOBULIN UR ELPH-MCNC: 2.4 GM/DL
GLUCOSE SERPL-MCNC: 90 MG/DL (ref 65–99)
GLUCOSE UR STRIP-MCNC: NEGATIVE MG/DL
HCT VFR BLD AUTO: 42.6 % (ref 37.5–51)
HDLC SERPL-MCNC: 68 MG/DL (ref 40–60)
HGB BLD-MCNC: 14.2 G/DL (ref 13–17.7)
HGB UR QL STRIP.AUTO: NEGATIVE
IMM GRANULOCYTES # BLD AUTO: 0.01 10*3/MM3 (ref 0–0.05)
IMM GRANULOCYTES NFR BLD AUTO: 0.2 % (ref 0–0.5)
KETONES UR QL STRIP: NEGATIVE
LDLC SERPL CALC-MCNC: 91 MG/DL (ref 0–100)
LDLC/HDLC SERPL: 1.33 {RATIO}
LEUKOCYTE ESTERASE UR QL STRIP.AUTO: NEGATIVE
LYMPHOCYTES # BLD AUTO: 1.17 10*3/MM3 (ref 0.7–3.1)
LYMPHOCYTES NFR BLD AUTO: 21.7 % (ref 19.6–45.3)
MCH RBC QN AUTO: 30.7 PG (ref 26.6–33)
MCHC RBC AUTO-ENTMCNC: 33.3 G/DL (ref 31.5–35.7)
MCV RBC AUTO: 92 FL (ref 79–97)
MONOCYTES # BLD AUTO: 0.46 10*3/MM3 (ref 0.1–0.9)
MONOCYTES NFR BLD AUTO: 8.6 % (ref 5–12)
NEUTROPHILS NFR BLD AUTO: 3.45 10*3/MM3 (ref 1.7–7)
NEUTROPHILS NFR BLD AUTO: 64.1 % (ref 42.7–76)
NITRITE UR QL STRIP: NEGATIVE
NRBC BLD AUTO-RTO: 0 /100 WBC (ref 0–0.2)
PH UR STRIP.AUTO: 7.5 [PH] (ref 5–8)
PLATELET # BLD AUTO: 250 10*3/MM3 (ref 140–450)
PMV BLD AUTO: 10.3 FL (ref 6–12)
POTASSIUM SERPL-SCNC: 4.6 MMOL/L (ref 3.5–5.2)
PROT SERPL-MCNC: 6.7 G/DL (ref 6–8.5)
PROT UR QL STRIP: NEGATIVE
PSA SERPL-MCNC: 0.8 NG/ML (ref 0–4)
RBC # BLD AUTO: 4.63 10*6/MM3 (ref 4.14–5.8)
SODIUM SERPL-SCNC: 141 MMOL/L (ref 136–145)
SP GR UR STRIP: 1.01 (ref 1–1.03)
T3 SERPL-MCNC: 94.7 NG/DL (ref 80–200)
T4 FREE SERPL-MCNC: 1.94 NG/DL (ref 0.93–1.7)
TRIGL SERPL-MCNC: 64 MG/DL (ref 0–150)
TSH SERPL DL<=0.05 MIU/L-ACNC: 0.76 UIU/ML (ref 0.27–4.2)
UROBILINOGEN UR QL STRIP: NORMAL
VLDLC SERPL-MCNC: 12 MG/DL (ref 5–40)
WBC # BLD AUTO: 5.38 10*3/MM3 (ref 3.4–10.8)

## 2021-02-11 PROCEDURE — 81003 URINALYSIS AUTO W/O SCOPE: CPT | Performed by: FAMILY MEDICINE

## 2021-02-11 PROCEDURE — 85025 COMPLETE CBC W/AUTO DIFF WBC: CPT | Performed by: FAMILY MEDICINE

## 2021-02-11 PROCEDURE — G0103 PSA SCREENING: HCPCS | Performed by: FAMILY MEDICINE

## 2021-02-11 PROCEDURE — 80053 COMPREHEN METABOLIC PANEL: CPT | Performed by: FAMILY MEDICINE

## 2021-02-11 PROCEDURE — 84439 ASSAY OF FREE THYROXINE: CPT | Performed by: FAMILY MEDICINE

## 2021-02-11 PROCEDURE — 84480 ASSAY TRIIODOTHYRONINE (T3): CPT | Performed by: FAMILY MEDICINE

## 2021-02-11 PROCEDURE — 84443 ASSAY THYROID STIM HORMONE: CPT | Performed by: FAMILY MEDICINE

## 2021-02-11 PROCEDURE — 80061 LIPID PANEL: CPT | Performed by: FAMILY MEDICINE

## 2021-02-11 PROCEDURE — 36415 COLL VENOUS BLD VENIPUNCTURE: CPT

## 2021-02-12 ENCOUNTER — OFFICE VISIT (OUTPATIENT)
Dept: FAMILY MEDICINE CLINIC | Facility: CLINIC | Age: 66
End: 2021-02-12

## 2021-02-12 VITALS
RESPIRATION RATE: 16 BRPM | WEIGHT: 173 LBS | BODY MASS INDEX: 24.77 KG/M2 | SYSTOLIC BLOOD PRESSURE: 142 MMHG | HEIGHT: 70 IN | DIASTOLIC BLOOD PRESSURE: 80 MMHG | HEART RATE: 69 BPM | OXYGEN SATURATION: 97 % | TEMPERATURE: 96.8 F

## 2021-02-12 DIAGNOSIS — R09.89 THROAT CLEARING: ICD-10-CM

## 2021-02-12 DIAGNOSIS — Z00.00 MEDICARE ANNUAL WELLNESS VISIT, SUBSEQUENT: Primary | ICD-10-CM

## 2021-02-12 DIAGNOSIS — I15.9 SECONDARY HYPERTENSION: ICD-10-CM

## 2021-02-12 DIAGNOSIS — E03.9 HYPOTHYROIDISM, UNSPECIFIED TYPE: ICD-10-CM

## 2021-02-12 PROCEDURE — G0439 PPPS, SUBSEQ VISIT: HCPCS | Performed by: FAMILY MEDICINE

## 2021-02-12 PROCEDURE — G0009 ADMIN PNEUMOCOCCAL VACCINE: HCPCS | Performed by: FAMILY MEDICINE

## 2021-02-12 PROCEDURE — 90732 PPSV23 VACC 2 YRS+ SUBQ/IM: CPT | Performed by: FAMILY MEDICINE

## 2021-02-12 NOTE — PROGRESS NOTES
The ABCs of the Annual Wellness Visit  Subsequent Medicare Wellness Visit    Chief Complaint   Patient presents with   • Medicare Wellness-subsequent       Subjective   History of Present Illness:  John Jones is a 65 y.o. male who presents for a Subsequent Medicare Wellness Visit.  Patient walks daily when weather permits, he walks about 2 miles.  He tries eat a nutritious diet and avoids fast food and soft drinks.  He denies any chest pain dizziness syncope or CHERRY with exertion.  He denies any issues with his bowel function.  Is no blood in his stool or blood in his urine.  He had his Covid vaccine on 2/8/2021 and has a second vaccine scheduled already.  Patient's memory is good he has no issues with taking care of his own financials ADLs or transportation.      She complains of clearing of his throat quite a bit.  Is been going on for quite some time.  He takes an allergy medicine and it seems to help.  He does not have heartburn.     Patient has hypertension and takes his amlodipine and Cardura and has no issues with medication.  He monitors his blood pressure at home and is normal.     Patient has hypothyroidism and takes his Synthroid 150 mcg daily.    HEALTH RISK ASSESSMENT    Recent Hospitalizations:  No hospitalization(s) within the last year.    Current Medical Providers:  Patient Care Team:  Diego Benavidez MD as PCP - General (Family Medicine)  Diego Benavidez MD as PCP - Family Medicine    Smoking Status:  Social History     Tobacco Use   Smoking Status Never Smoker   Smokeless Tobacco Never Used       Alcohol Consumption:  Social History     Substance and Sexual Activity   Alcohol Use Yes   • Frequency: 2-4 times a month   • Drinks per session: 3 or 4   • Binge frequency: Less than monthly    Comment: SOCIALLY       Depression Screen:   PHQ-2/PHQ-9 Depression Screening 2/12/2021   Little interest or pleasure in doing things 0   Feeling down, depressed, or hopeless 0   Total Score 0       Fall Risk  Screen:  BRAYDON Fall Risk Assessment was completed, and patient is at LOW risk for falls.Assessment completed on:2/12/2021    Health Habits and Functional and Cognitive Screening:  Functional & Cognitive Status 2/12/2021   Do you have difficulty preparing food and eating? No   Do you have difficulty bathing yourself, getting dressed or grooming yourself? No   Do you have difficulty using the toilet? No   Do you have difficulty moving around from place to place? No   Do you have trouble with steps or getting out of a bed or a chair? No   Current Diet Well Balanced Diet   Dental Exam Up to date   Eye Exam Not up to date   Exercise (times per week) 7 times per week   Current Exercise Activities Include Walking   Do you need help using the phone?  No   Are you deaf or do you have serious difficulty hearing?  No   Do you need help with transportation? No   Do you need help shopping? No   Do you need help preparing meals?  No   Do you need help with housework?  No   Do you need help with laundry? No   Do you need help taking your medications? No   Do you need help managing money? No   Do you ever drive or ride in a car without wearing a seat belt? No   Have you felt unusual stress, anger or loneliness in the last month? No   Who do you live with? Spouse   If you need help, do you have trouble finding someone available to you? No   Have you been bothered in the last four weeks by sexual problems? No   Do you have difficulty concentrating, remembering or making decisions? No         Does the patient have evidence of cognitive impairment? No    Asprin use counseling:Taking ASA appropriately as indicated    Age-appropriate Screening Schedule:  Refer to the list below for future screening recommendations based on patient's age, sex and/or medical conditions. Orders for these recommended tests are listed in the plan section. The patient has been provided with a written plan.    Health Maintenance   Topic Date Due   • LIPID  PANEL  02/11/2022   • TDAP/TD VACCINES (2 - Td) 12/11/2025   • COLONOSCOPY  04/12/2027   • INFLUENZA VACCINE  Completed   • ZOSTER VACCINE  Completed          The following portions of the patient's history were reviewed and updated as appropriate: allergies, current medications, past family history, past medical history, past social history, past surgical history and problem list.    Outpatient Medications Prior to Visit   Medication Sig Dispense Refill   • amLODIPine (NORVASC) 5 MG tablet 1 po bid 180 tablet 3   • aspirin 81 MG tablet Take 81 mg by mouth Daily. PT TO HOLD FOR SURGERY     • doxazosin (CARDURA) 4 MG tablet Take 1 tablet by mouth every night at bedtime. 90 tablet 3   • levothyroxine (SYNTHROID, LEVOTHROID) 150 MCG tablet Take 1 tablet by mouth Daily. 90 tablet 3   • cephalexin (KEFLEX) 500 MG capsule Take 1 capsule by mouth 4 (Four) Times a Day. 28 capsule 0     No facility-administered medications prior to visit.        Patient Active Problem List   Diagnosis   • Cervical radiculopathy   • Muscle left arm weakness   • Welcome to Medicare preventive visit   • Essential hypertension   • White coat syndrome with hypertension   • Chromhidrosis   • Benign prostatic hyperplasia   • Biceps tendinitis, right   • Elevated blood pressure reading without diagnosis of hypertension   • Hammer toe   • Hyperlipidemia   • Microscopic hematuria   • Neck pain   • Rash   • Rotator cuff tendonitis   • Shoulder pain   • Subacromial bursitis of right shoulder joint   • Hypertension   • Encounter for general adult medical examination without abnormal findings   • Prostate cancer screening   • Medicare annual wellness visit, subsequent   • Hypothyroidism   • Throat clearing       Advanced Care Planning:  ACP discussion was held with the patient during this visit. Patient does not have an advance directive, declines further assistance.    Review of Systems   Constitutional: Negative for chills, fatigue and fever.   HENT:  "Negative for congestion, nosebleeds and rhinorrhea.         Clear his throat a lot   Eyes: Negative for discharge and visual disturbance.   Respiratory: Negative for chest tightness and shortness of breath.    Cardiovascular: Negative for chest pain and palpitations.   Gastrointestinal: Negative for abdominal pain and blood in stool.   Endocrine: Negative for polydipsia and polyuria.   Genitourinary: Negative for dysuria and hematuria.   Musculoskeletal: Negative for back pain and myalgias.   Skin: Negative for rash.   Neurological: Negative for dizziness and syncope.   Psychiatric/Behavioral: Negative for confusion, dysphoric mood and self-injury.       Compared to one year ago, the patient feels his physical health is the same.  Compared to one year ago, the patient feels his mental health is the same.    Reviewed chart for potential of high risk medication in the elderly: yes  Reviewed chart for potential of harmful drug interactions in the elderly:yes    Objective         Vitals:    02/12/21 0934   BP: 142/80   BP Location: Right arm   Patient Position: Sitting   Cuff Size: Adult   Pulse: 69   Resp: 16   Temp: 96.8 °F (36 °C)   TempSrc: Skin   SpO2: 97%   Weight: 78.5 kg (173 lb)   Height: 177.8 cm (70\")       Body mass index is 24.82 kg/m².  Discussed the patient's BMI with him. The BMI is in the acceptable range.    Physical Exam  Vitals signs and nursing note reviewed.   Constitutional:       General: He is not in acute distress.     Appearance: Normal appearance. He is well-developed and normal weight.      Comments: Pleasant male in no acute distress   HENT:      Head: Normocephalic and atraumatic.      Right Ear: Tympanic membrane and ear canal normal.      Left Ear: Tympanic membrane and ear canal normal.      Nose: Nose normal.      Mouth/Throat:      Mouth: Mucous membranes are moist.      Pharynx: Oropharynx is clear.   Eyes:      General: Lids are normal.      Extraocular Movements: Extraocular " movements intact.      Conjunctiva/sclera: Conjunctivae normal.      Pupils: Pupils are equal, round, and reactive to light.   Neck:      Musculoskeletal: Normal range of motion and neck supple.      Thyroid: No thyroid mass or thyromegaly.      Vascular: No carotid bruit or JVD.      Trachea: Trachea normal.      Comments: No carotid bruits  Cardiovascular:      Rate and Rhythm: Normal rate and regular rhythm.      Pulses: Normal pulses.      Heart sounds: Normal heart sounds.   Pulmonary:      Effort: Pulmonary effort is normal.      Breath sounds: Normal breath sounds.   Abdominal:      General: Bowel sounds are normal. There is no distension.      Palpations: Abdomen is soft. There is no mass.      Tenderness: There is no abdominal tenderness.   Genitourinary:     Rectum: Normal.      Comments: Prostate enlarged, no nodules  Musculoskeletal:      Comments: No c/c/e   Skin:     General: Skin is warm and dry.   Neurological:      General: No focal deficit present.      Mental Status: He is alert and oriented to person, place, and time.      Cranial Nerves: No cranial nerve deficit.   Psychiatric:         Attention and Perception: He is attentive.         Mood and Affect: Mood normal.         Speech: Speech normal.         Behavior: Behavior normal.         Thought Content: Thought content normal.         Judgment: Judgment normal.         Lab Results   Component Value Date    TRIG 64 02/11/2021    HDL 68 (H) 02/11/2021    LDL 91 02/11/2021    VLDL 12 02/11/2021        Assessment/Plan   Medicare Risks and Personalized Health Plan  CMS Preventative Services Quick Reference  Abdominal Aortic Aneurysm Screening  Advance Directive Discussion  Cardiovascular risk  Colon Cancer Screening  Dementia/Memory   Depression/Dysphoria  Diabetic Lab Screening   Fall Risk  Glaucoma Risk  Hearing Problem  Immunizations Discussed/Encouraged (specific immunizations; adacel Tdap, Influenza, Pneumococcal 23, Prevnar and Shingrix  )  Inadequate Social Support, Isolation, Loneliness, Lack of Transportation, Financial Difficulties, or Caregiver Stress   Inactivity/Sedentary  Prostate Cancer Screening     The above risks/problems have been discussed with the patient.  Pertinent information has been shared with the patient in the After Visit Summary.  Follow up plans and orders are seen below in the Assessment/Plan Section.    Diagnoses and all orders for this visit:    1. Medicare annual wellness visit, subsequent (Primary)  Assessment & Plan:  I recommend the patient continue with regular aerobic exercise for his cardiovascular health.  Nutritious diet high in fiber and vegetable, lower carbohydrate, eating lean cuts of meat higher amounts fish.  Immunizations discussed with patient as well.      2. Throat clearing  Assessment & Plan:  I will go ahead and just try loratadine or Allegra daily.  If this is not helping then I would consider LPR and start a proton pump inhibitor daily.  He can follow-up as needed for this.      3. Secondary hypertension  Assessment & Plan:  Hypertension is improving with treatment.  Continue current treatment regimen.  Dietary sodium restriction.  Regular aerobic exercise.  Blood pressure will be reassessed at the next regular appointment.      4. Hypothyroidism, unspecified type  Assessment & Plan:  Continue Synthroid and recheck TFTs.  We will make dosage adjustments based on results      Other orders  -     Cancel: Hepatitis C antibody; Future  -     Cancel: Pneumococcal Polysaccharide Vaccine 23-Valent (PPSV23) Greater Than or Equal To 1yo Subcutaneous / IM  -     Pneumococcal Polysaccharide Vaccine 23-Valent (PPSV23) Greater Than or Equal To 1yo Subcutaneous / IM    Follow Up:  Return in about 1 year (around 2/12/2022), or if symptoms worsen or fail to improve, for Medicare Wellness.     An After Visit Summary and PPPS were given to the patient.

## 2021-02-14 PROBLEM — R09.89 THROAT CLEARING: Status: ACTIVE | Noted: 2021-02-14

## 2021-02-14 NOTE — ASSESSMENT & PLAN NOTE
I will go ahead and just try loratadine or Allegra daily.  If this is not helping then I would consider LPR and start a proton pump inhibitor daily.  He can follow-up as needed for this.

## 2021-02-14 NOTE — ASSESSMENT & PLAN NOTE
I recommend the patient continue with regular aerobic exercise for his cardiovascular health.  Nutritious diet high in fiber and vegetable, lower carbohydrate, eating lean cuts of meat higher amounts fish.  Immunizations discussed with patient as well.

## 2021-02-16 ENCOUNTER — DOCUMENTATION (OUTPATIENT)
Dept: PHYSICAL THERAPY | Facility: CLINIC | Age: 66
End: 2021-02-16

## 2021-02-16 NOTE — PROGRESS NOTES
Discharge Summary  Discharge Summary from Physical Therapy Report        Number of Visits: 11         Goals: All Met    Discharge Plan: Continue with current home exercise program as instructed    Date of Discharge 2/16/21      Baltazar Rod PT  Physical Therapist

## 2021-05-11 NOTE — TELEPHONE ENCOUNTER
Spouse was in stating that patient is needing refills for    amLODIPine (NORVASC) 5 MG     doxazosin (CARDURA) 4 MG     levothyroxine (SYNTHROID, LEVOTHROID) 150 MCG

## 2021-05-13 RX ORDER — DOXAZOSIN MESYLATE 4 MG/1
4 TABLET ORAL
Qty: 90 TABLET | Refills: 3 | Status: SHIPPED | OUTPATIENT
Start: 2021-05-13 | End: 2022-07-01

## 2021-05-13 RX ORDER — AMLODIPINE BESYLATE 5 MG/1
TABLET ORAL
Qty: 180 TABLET | Refills: 3 | Status: SHIPPED | OUTPATIENT
Start: 2021-05-13 | End: 2022-07-01

## 2021-05-13 RX ORDER — LEVOTHYROXINE SODIUM 0.15 MG/1
150 TABLET ORAL DAILY
Qty: 90 TABLET | Refills: 3 | Status: SHIPPED | OUTPATIENT
Start: 2021-05-13 | End: 2022-03-04

## 2022-01-12 ENCOUNTER — TELEMEDICINE (OUTPATIENT)
Dept: FAMILY MEDICINE CLINIC | Facility: TELEHEALTH | Age: 67
End: 2022-01-12

## 2022-01-12 DIAGNOSIS — H66.002 ACUTE SUPPURATIVE OTITIS MEDIA OF LEFT EAR WITHOUT SPONTANEOUS RUPTURE OF TYMPANIC MEMBRANE, RECURRENCE NOT SPECIFIED: Primary | ICD-10-CM

## 2022-01-12 PROCEDURE — 99213 OFFICE O/P EST LOW 20 MIN: CPT | Performed by: NURSE PRACTITIONER

## 2022-01-12 RX ORDER — METHYLPREDNISOLONE 4 MG/1
TABLET ORAL
Qty: 21 TABLET | Refills: 0 | Status: SHIPPED | OUTPATIENT
Start: 2022-01-12 | End: 2022-03-04

## 2022-01-12 RX ORDER — CEFDINIR 300 MG/1
300 CAPSULE ORAL 2 TIMES DAILY
Qty: 20 CAPSULE | Refills: 0 | Status: SHIPPED | OUTPATIENT
Start: 2022-01-12 | End: 2022-01-22

## 2022-01-12 NOTE — PROGRESS NOTES
You have chosen to receive care through a telehealth visit.  Do you consent to use a video/audio connection for your medical care today? Yes     CHIEF COMPLAINT  No chief complaint on file.        HPI  John Jones is a 66 y.o. male  presents with complaint of 3 day history of sore throat on left side of throat with ear pain, congestion, mild cough.  Denies fever, body aches, runny nose, shortness of breath, wheezing.     Review of Systems   Constitutional: Negative for fatigue and fever.   HENT: Positive for congestion, ear pain and sore throat.    Respiratory: Positive for cough. Negative for chest tightness, shortness of breath and wheezing.    Neurological: Negative for headaches.   All other systems reviewed and are negative.      Past Medical History:   Diagnosis Date   • Arthritis    • Biceps tendon rupture     right   • BPH (benign prostatic hyperplasia)    • Cervical spinal stenosis    • Early cataract    • History of hepatitis A     30 YEARS AGO   • Hypertension    • Hypothyroidism    • Knee osteoarthritis    • Knee pain, bilateral    • Spinal stenosis     NUMBNESS/TINGLING LEFT ARM       Family History   Problem Relation Age of Onset   • Pancreatic cancer Mother    • Heart disease Father         bladder cancer   • No Known Problems Sister    • Liver cancer Brother    • Malig Hyperthermia Neg Hx        Social History     Socioeconomic History   • Marital status:    • Number of children: 2   • Years of education: 12   • Highest education level: High school graduate   Tobacco Use   • Smoking status: Never Smoker   • Smokeless tobacco: Never Used   Vaping Use   • Vaping Use: Never used   • Passive vaping exposure: Yes   Substance and Sexual Activity   • Alcohol use: Yes     Comment: SOCIALLY   • Drug use: No   • Sexual activity: Yes     Partners: Female         There were no vitals taken for this visit.    PHYSICAL EXAM  Physical Exam   Constitutional: He is oriented to person, place, and time. He  appears well-developed and well-nourished. He does not have a sickly appearance. He does not appear ill.   HENT:   Head: Normocephalic and atraumatic.   Pulmonary/Chest: Effort normal.  No respiratory distress.  Lymphadenopathy:        Left cervical: Anterior cervical adenopathy present.   Neurological: He is alert and oriented to person, place, and time.         Diagnoses and all orders for this visit:    1. Acute suppurative otitis media of left ear without spontaneous rupture of tympanic membrane, recurrence not specified (Primary)  -     cefdinir (OMNICEF) 300 MG capsule; Take 1 capsule by mouth 2 (Two) Times a Day for 10 days.  Dispense: 20 capsule; Refill: 0  -     methylPREDNISolone (MEDROL) 4 MG dose pack; Take as directed on package instructions.  Dispense: 21 tablet; Refill: 0    --take medications as prescribed  --f/u in 3-5 days if no improvement      FOLLOW-UP  As discussed during visit with PCP/[facility] if no improvement or Urgent Care/Emergency Department if worsening of symptoms    Patient verbalizes understanding of medication dosage, comfort measures, instructions for treatment and follow-up.    Jessica Angel, NALDO  01/12/2022  18:09 EST    This visit was performed via Telehealth.  This patient has been instructed to follow-up with their primary care provider if their symptoms worsen or the treatment provided does not resolve their illness.

## 2022-01-12 NOTE — PATIENT INSTRUCTIONS
Otitis Media, Adult    Otitis media occurs when there is inflammation and fluid in the middle ear space with signs and symptoms of an acute infection. The middle ear is a part of the ear that contains bones for hearing as well as air that helps send sounds to the brain. When infected fluid builds up in this space, it causes pressure and results in symptoms of acute otitis media. The eustachian tube connects the middle ear to the back of the nose (nasopharynx) and normally allows air into the middle ear space. If the eustachian tube becomes blocked, fluid can build up and become infected.  What are the causes?  This condition is caused by a blockage in the eustachian tube. This can be caused by an object like mucus, or by swelling (edema) of the tube. Problems that can cause a blockage include:  · A cold or other upper respiratory infection.  · Allergies.  · An irritant, such as tobacco smoke.  · Enlarged adenoids. The adenoids are areas of soft tissue located high in the back of the throat, behind the nose and the roof of the mouth. They are part of the body's defense system (immune system).  · A mass in the nasopharynx.  · Damage to the ear caused by pressure changes (barotrauma).  What are the signs or symptoms?  Symptoms of this condition include:  · Ear pain.  · Fever.  · Decreased hearing.  · Tiredness (lethargy).  · Fluid leaking from the ear, if the eardrum is ruptured or has burst.  · Ringing in the ear.  How is this diagnosed?    This condition is diagnosed with a physical exam. During the exam, your health care provider will use an instrument called an otoscope to look in your ear and check for redness, swelling, and fluid. He or she will also ask about your symptoms.  Your health care provider may also order tests, such as:  · A pneumatic otoscopy. This is a test to check the movement of the eardrum. It is done by squeezing a small amount of air into the ear.  · A tympanogram is a test that shows how well  the eardrum moves in response to air pressure in the ear canal. It provides a graph for your health care provider to review.  How is this treated?  This condition can go away on its own within 3-5 days. But if the condition is caused by a bacterial infection and does not go away on its own, or if it keeps coming back, your health care provider may:  · Prescribe antibiotic medicine to treat the infection.  · Prescribe or recommend medicines to control pain.  Follow these instructions at home:  · Take over-the-counter and prescription medicines only as told by your health care provider.  · If you were prescribed an antibiotic medicine, take it as told by your health care provider. Do not stop taking the antibiotic even if you start to feel better.  · Keep all follow-up visits as told by your health care provider. This is important.  Contact a health care provider if:  · You have bleeding from your nose.  · There is a lump on your neck.  · You are not feeling better in 5 days.  · You feel worse instead of better.  Get help right away if:  · You have severe pain that is not controlled with medicine.  · You have swelling, redness, or pain around your ear.  · You have stiffness in your neck.  · A part of your face is not moving (paralyzed).  · The bone behind your ear (mastoid) is tender when you touch it.  · You develop a severe headache.  Summary  · Otitis media is redness, soreness, and swelling of the middle ear, usually resulting in pain.  · This condition can go away on its own within 3-5 days.  · If the problem does not go away in 3-5 days, your health care provider may prescribe or recommend medicines to treat the infection or your symptoms.  · If you were prescribed an antibiotic medicine, take it as told by your health care provider.  · Follow all instructions you were given by your health care provider.  This information is not intended to replace advice given to you by your health care provider. Make sure you  discuss any questions you have with your health care provider.  Document Revised: 11/19/2020 Document Reviewed: 11/19/2020  Elsevier Patient Education © 2021 [employer]      Upper Respiratory Infection, Adult  An upper respiratory infection (URI) is a common viral infection of the nose, throat, and upper air passages that lead to the lungs. The most common type of URI is the common cold. URIs usually get better on their own, without medical treatment.  What are the causes?  A URI is caused by a virus. You may catch a virus by:  · Breathing in droplets from an infected person's cough or sneeze.  · Touching something that has been exposed to the virus (contaminated) and then touching your mouth, nose, or eyes.  What increases the risk?  You are more likely to get a URI if:  · You are very young or very old.  · It is jasper or winter.  · You have close contact with others, such as at a , school, or health care facility.  · You smoke.  · You have long-term (chronic) heart or lung disease.  · You have a weakened disease-fighting (immune) system.  · You have nasal allergies or asthma.  · You are experiencing a lot of stress.  · You work in an area that has poor air circulation.  · You have poor nutrition.  What are the signs or symptoms?  A URI usually involves some of the following symptoms:  · Runny or stuffy (congested) nose.  · Sneezing.  · Cough.  · Sore throat.  · Headache.  · Fatigue.  · Fever.  · Loss of appetite.  · Pain in your forehead, behind your eyes, and over your cheekbones (sinus pain).  · Muscle aches.  · Redness or irritation of the eyes.  · Pressure in the ears or face.  How is this diagnosed?  This condition may be diagnosed based on your medical history and symptoms, and a physical exam. Your health care provider may use a cotton swab to take a mucus sample from your nose (nasal swab). This sample can be tested to determine what virus is causing the illness.  How is this treated?  URIs  usually get better on their own within 7-10 days. You can take steps at home to relieve your symptoms. Medicines cannot cure URIs, but your health care provider may recommend certain medicines to help relieve symptoms, such as:  · Over-the-counter cold medicines.  · Cough suppressants. Coughing is a type of defense against infection that helps to clear the respiratory system, so take these medicines only as recommended by your health care provider.  · Fever-reducing medicines.  Follow these instructions at home:  Activity  · Rest as needed.  · If you have a fever, stay home from work or school until your fever is gone or until your health care provider says you are no longer contagious. Your health care provider may have you wear a face mask to prevent your infection from spreading.  Relieving symptoms  · Gargle with a salt-water mixture 3-4 times a day or as needed. To make a salt-water mixture, completely dissolve ½-1 tsp of salt in 1 cup of warm water.  · Use a cool-mist humidifier to add moisture to the air. This can help you breathe more easily.  Eating and drinking    · Drink enough fluid to keep your urine pale yellow.  · Eat soups and other clear broths.    General instructions    · Take over-the-counter and prescription medicines only as told by your health care provider. These include cold medicines, fever reducers, and cough suppressants.  · Do not use any products that contain nicotine or tobacco, such as cigarettes and e-cigarettes. If you need help quitting, ask your health care provider.  · Stay away from secondhand smoke.  · Stay up to date on all immunizations, including the yearly (annual) flu vaccine.  · Keep all follow-up visits as told by your health care provider. This is important.    How to prevent the spread of infection to others    · URIs can be passed from person to person (are contagious). To prevent the infection from spreading:  ? Wash your hands often with soap and water. If soap and  water are not available, use hand .  ? Avoid touching your mouth, face, eyes, or nose.  ? Cough or sneeze into a tissue or your sleeve or elbow instead of into your hand or into the air.    Contact a health care provider if:  · You are getting worse instead of better.  · You have a fever or chills.  · Your mucus is brown or red.  · You have yellow or brown discharge coming from your nose.  · You have pain in your face, especially when you bend forward.  · You have swollen neck glands.  · You have pain while swallowing.  · You have white areas in the back of your throat.  Get help right away if:  · You have shortness of breath that gets worse.  · You have severe or persistent:  ? Headache.  ? Ear pain.  ? Sinus pain.  ? Chest pain.  · You have chronic lung disease along with any of the following:  ? Wheezing.  ? Prolonged cough.  ? Coughing up blood.  ? A change in your usual mucus.  · You have a stiff neck.  · You have changes in your:  ? Vision.  ? Hearing.  ? Thinking.  ? Mood.  Summary  · An upper respiratory infection (URI) is a common infection of the nose, throat, and upper air passages that lead to the lungs.  · A URI is caused by a virus.  · URIs usually get better on their own within 7-10 days.  · Medicines cannot cure URIs, but your health care provider may recommend certain medicines to help relieve symptoms.  This information is not intended to replace advice given to you by your health care provider. Make sure you discuss any questions you have with your health care provider.  Document Revised: 12/26/2019 Document Reviewed: 08/03/2018  [employer] Patient Education © 2021 [employer]

## 2022-02-23 DIAGNOSIS — Z12.5 SCREENING FOR PROSTATE CANCER: ICD-10-CM

## 2022-02-23 DIAGNOSIS — I10 ESSENTIAL HYPERTENSION: ICD-10-CM

## 2022-02-23 DIAGNOSIS — E78.5 HYPERLIPIDEMIA, UNSPECIFIED HYPERLIPIDEMIA TYPE: ICD-10-CM

## 2022-02-23 DIAGNOSIS — E03.9 HYPOTHYROIDISM, UNSPECIFIED TYPE: Primary | ICD-10-CM

## 2022-02-25 ENCOUNTER — LAB (OUTPATIENT)
Dept: FAMILY MEDICINE CLINIC | Facility: CLINIC | Age: 67
End: 2022-02-25

## 2022-02-25 DIAGNOSIS — I10 ESSENTIAL HYPERTENSION: ICD-10-CM

## 2022-02-25 DIAGNOSIS — E78.5 HYPERLIPIDEMIA, UNSPECIFIED HYPERLIPIDEMIA TYPE: ICD-10-CM

## 2022-02-25 DIAGNOSIS — E03.9 HYPOTHYROIDISM, UNSPECIFIED TYPE: ICD-10-CM

## 2022-02-25 DIAGNOSIS — Z12.5 SCREENING FOR PROSTATE CANCER: ICD-10-CM

## 2022-02-25 LAB
ALBUMIN SERPL-MCNC: 4.3 G/DL (ref 3.5–5.2)
ALBUMIN/GLOB SERPL: 1.8 G/DL
ALP SERPL-CCNC: 64 U/L (ref 39–117)
ALT SERPL W P-5'-P-CCNC: 14 U/L (ref 1–41)
ANION GAP SERPL CALCULATED.3IONS-SCNC: 11 MMOL/L (ref 5–15)
AST SERPL-CCNC: 14 U/L (ref 1–40)
BASOPHILS # BLD AUTO: 0.03 10*3/MM3 (ref 0–0.2)
BASOPHILS NFR BLD AUTO: 0.6 % (ref 0–1.5)
BILIRUB SERPL-MCNC: 0.5 MG/DL (ref 0–1.2)
BUN SERPL-MCNC: 29 MG/DL (ref 8–23)
BUN/CREAT SERPL: 32.2 (ref 7–25)
CALCIUM SPEC-SCNC: 9.5 MG/DL (ref 8.6–10.5)
CHLORIDE SERPL-SCNC: 105 MMOL/L (ref 98–107)
CHOLEST SERPL-MCNC: 171 MG/DL (ref 0–200)
CO2 SERPL-SCNC: 26 MMOL/L (ref 22–29)
CREAT SERPL-MCNC: 0.9 MG/DL (ref 0.76–1.27)
DEPRECATED RDW RBC AUTO: 42.1 FL (ref 37–54)
EOSINOPHIL # BLD AUTO: 0.1 10*3/MM3 (ref 0–0.4)
EOSINOPHIL NFR BLD AUTO: 2.1 % (ref 0.3–6.2)
ERYTHROCYTE [DISTWIDTH] IN BLOOD BY AUTOMATED COUNT: 12.7 % (ref 12.3–15.4)
GFR SERPL CREATININE-BSD FRML MDRD: 84 ML/MIN/1.73
GLOBULIN UR ELPH-MCNC: 2.4 GM/DL
GLUCOSE SERPL-MCNC: 81 MG/DL (ref 65–99)
HCT VFR BLD AUTO: 44.9 % (ref 37.5–51)
HDLC SERPL-MCNC: 66 MG/DL (ref 40–60)
HGB BLD-MCNC: 14.8 G/DL (ref 13–17.7)
IMM GRANULOCYTES # BLD AUTO: 0.01 10*3/MM3 (ref 0–0.05)
IMM GRANULOCYTES NFR BLD AUTO: 0.2 % (ref 0–0.5)
LDLC SERPL CALC-MCNC: 92 MG/DL (ref 0–100)
LDLC/HDLC SERPL: 1.39 {RATIO}
LYMPHOCYTES # BLD AUTO: 1.2 10*3/MM3 (ref 0.7–3.1)
LYMPHOCYTES NFR BLD AUTO: 25.2 % (ref 19.6–45.3)
MCH RBC QN AUTO: 30.1 PG (ref 26.6–33)
MCHC RBC AUTO-ENTMCNC: 33 G/DL (ref 31.5–35.7)
MCV RBC AUTO: 91.3 FL (ref 79–97)
MONOCYTES # BLD AUTO: 0.44 10*3/MM3 (ref 0.1–0.9)
MONOCYTES NFR BLD AUTO: 9.2 % (ref 5–12)
NEUTROPHILS NFR BLD AUTO: 2.99 10*3/MM3 (ref 1.7–7)
NEUTROPHILS NFR BLD AUTO: 62.7 % (ref 42.7–76)
NRBC BLD AUTO-RTO: 0 /100 WBC (ref 0–0.2)
PLATELET # BLD AUTO: 263 10*3/MM3 (ref 140–450)
PMV BLD AUTO: 10 FL (ref 6–12)
POTASSIUM SERPL-SCNC: 4.6 MMOL/L (ref 3.5–5.2)
PROT SERPL-MCNC: 6.7 G/DL (ref 6–8.5)
PSA SERPL-MCNC: 0.86 NG/ML (ref 0–4)
RBC # BLD AUTO: 4.92 10*6/MM3 (ref 4.14–5.8)
SODIUM SERPL-SCNC: 142 MMOL/L (ref 136–145)
T4 FREE SERPL-MCNC: 1.98 NG/DL (ref 0.93–1.7)
TRIGL SERPL-MCNC: 65 MG/DL (ref 0–150)
TSH SERPL DL<=0.05 MIU/L-ACNC: 0.09 UIU/ML (ref 0.27–4.2)
VLDLC SERPL-MCNC: 13 MG/DL (ref 5–40)
WBC NRBC COR # BLD: 4.77 10*3/MM3 (ref 3.4–10.8)

## 2022-02-25 PROCEDURE — G0103 PSA SCREENING: HCPCS | Performed by: PHYSICIAN ASSISTANT

## 2022-02-25 PROCEDURE — 80061 LIPID PANEL: CPT | Performed by: PHYSICIAN ASSISTANT

## 2022-02-25 PROCEDURE — 36415 COLL VENOUS BLD VENIPUNCTURE: CPT

## 2022-02-25 PROCEDURE — 84439 ASSAY OF FREE THYROXINE: CPT | Performed by: PHYSICIAN ASSISTANT

## 2022-02-25 PROCEDURE — 85025 COMPLETE CBC W/AUTO DIFF WBC: CPT | Performed by: PHYSICIAN ASSISTANT

## 2022-02-25 PROCEDURE — 84443 ASSAY THYROID STIM HORMONE: CPT | Performed by: PHYSICIAN ASSISTANT

## 2022-02-25 PROCEDURE — 80053 COMPREHEN METABOLIC PANEL: CPT | Performed by: PHYSICIAN ASSISTANT

## 2022-02-28 RX ORDER — LEVOTHYROXINE SODIUM 137 UG/1
137 TABLET ORAL DAILY
Qty: 90 TABLET | Refills: 0 | Status: SHIPPED | OUTPATIENT
Start: 2022-02-28 | End: 2022-05-30

## 2022-03-04 ENCOUNTER — OFFICE VISIT (OUTPATIENT)
Dept: FAMILY MEDICINE CLINIC | Facility: CLINIC | Age: 67
End: 2022-03-04

## 2022-03-04 VITALS
HEART RATE: 66 BPM | TEMPERATURE: 97.5 F | BODY MASS INDEX: 23.91 KG/M2 | HEIGHT: 70 IN | SYSTOLIC BLOOD PRESSURE: 118 MMHG | OXYGEN SATURATION: 99 % | DIASTOLIC BLOOD PRESSURE: 70 MMHG | RESPIRATION RATE: 17 BRPM | WEIGHT: 167 LBS

## 2022-03-04 DIAGNOSIS — Z00.00 MEDICARE ANNUAL WELLNESS VISIT, SUBSEQUENT: Primary | ICD-10-CM

## 2022-03-04 DIAGNOSIS — I10 ESSENTIAL HYPERTENSION: ICD-10-CM

## 2022-03-04 DIAGNOSIS — E03.9 HYPOTHYROIDISM, UNSPECIFIED TYPE: ICD-10-CM

## 2022-03-04 DIAGNOSIS — K21.9 GASTROESOPHAGEAL REFLUX DISEASE, UNSPECIFIED WHETHER ESOPHAGITIS PRESENT: ICD-10-CM

## 2022-03-04 PROCEDURE — 1170F FXNL STATUS ASSESSED: CPT | Performed by: PHYSICIAN ASSISTANT

## 2022-03-04 PROCEDURE — 99213 OFFICE O/P EST LOW 20 MIN: CPT | Performed by: PHYSICIAN ASSISTANT

## 2022-03-04 PROCEDURE — G0439 PPPS, SUBSEQ VISIT: HCPCS | Performed by: PHYSICIAN ASSISTANT

## 2022-03-04 PROCEDURE — 1160F RVW MEDS BY RX/DR IN RCRD: CPT | Performed by: PHYSICIAN ASSISTANT

## 2022-03-04 RX ORDER — PANTOPRAZOLE SODIUM 40 MG/1
40 TABLET, DELAYED RELEASE ORAL DAILY
Qty: 90 TABLET | Refills: 3 | Status: SHIPPED | OUTPATIENT
Start: 2022-03-04 | End: 2023-02-27 | Stop reason: SDUPTHER

## 2022-03-04 NOTE — PROGRESS NOTES
The ABCs of the Annual Wellness Visit  Subsequent Medicare Wellness Visit    Chief Complaint   Patient presents with   • Medicare Wellness-subsequent      Subjective    History of Present Illness:  John Jones is a 66 y.o. male who presents for a Subsequent Medicare Wellness Visit. He also has some feelings like something is stuck in his throat. No actual food bolus issue. No pain epigastric pain. No dark colored stools.     The following portions of the patient's history were reviewed and   updated as appropriate: allergies, current medications, past family history, past medical history, past social history, past surgical history and problem list.    Compared to one year ago, the patient feels his physical   health is the same.    Compared to one year ago, the patient feels his mental   health is the same.    Recent Hospitalizations:  He was not admitted to the hospital during the last year.       Current Medical Providers:  Patient Care Team:  Michael Pérez PA-C as PCP - General (Physician Assistant)    Outpatient Medications Prior to Visit   Medication Sig Dispense Refill   • amLODIPine (NORVASC) 5 MG tablet 1 po bid 180 tablet 3   • doxazosin (CARDURA) 4 MG tablet Take 1 tablet by mouth every night at bedtime. 90 tablet 3   • levothyroxine (SYNTHROID, LEVOTHROID) 137 MCG tablet Take 1 tablet by mouth Daily for 90 days. 90 tablet 0   • aspirin 81 MG tablet Take 81 mg by mouth Daily. PT TO HOLD FOR SURGERY     • levothyroxine (SYNTHROID, LEVOTHROID) 150 MCG tablet Take 1 tablet by mouth Daily. 90 tablet 3   • methylPREDNISolone (MEDROL) 4 MG dose pack Take as directed on package instructions. 21 tablet 0     No facility-administered medications prior to visit.       No opioid medication identified on active medication list. I have reviewed chart for other potential  high risk medication/s and harmful drug interactions in the elderly.          Aspirin is on active medication list. Aspirin use is not indicated  "based on review of current medical condition/s. Risk of harm outweighs potential benefits. Patient instructed to discontinue this medication.  .      Patient Active Problem List   Diagnosis   • Cervical radiculopathy   • Muscle left arm weakness   • Welcome to Medicare preventive visit   • Essential hypertension   • White coat syndrome with hypertension   • Chromhidrosis   • Benign prostatic hyperplasia   • Biceps tendinitis, right   • Elevated blood pressure reading without diagnosis of hypertension   • Hammer toe   • Hyperlipidemia   • Microscopic hematuria   • Neck pain   • Rash   • Rotator cuff tendonitis   • Shoulder pain   • Subacromial bursitis of right shoulder joint   • Hypertension   • Encounter for general adult medical examination without abnormal findings   • Prostate cancer screening   • Medicare annual wellness visit, subsequent   • Hypothyroidism   • Throat clearing   • Gastroesophageal reflux disease     Advance Care Planning  Advance Directive is not on file.  ACP discussion was held with the patient during this visit. Patient does not have an advance directive, information provided.          Objective    Vitals:    03/04/22 0801   BP: 118/70   Pulse: 66   Resp: 17   Temp: 97.5 °F (36.4 °C)   SpO2: 99%   Weight: 75.8 kg (167 lb)   Height: 177.8 cm (70\")     BMI Readings from Last 1 Encounters:   03/04/22 23.96 kg/m²   BMI is within normal parameters. No follow-up required.    Does the patient have evidence of cognitive impairment? No    Physical Exam  Constitutional:       Appearance: He is well-developed.   HENT:      Head: Normocephalic and atraumatic.   Eyes:      Conjunctiva/sclera: Conjunctivae normal.      Pupils: Pupils are equal, round, and reactive to light.   Cardiovascular:      Rate and Rhythm: Normal rate and regular rhythm.      Heart sounds: No murmur heard.      Pulmonary:      Effort: Pulmonary effort is normal.      Breath sounds: Normal breath sounds.   Abdominal:      General: " Bowel sounds are normal.      Palpations: Abdomen is soft.      Tenderness: There is no abdominal tenderness.   Musculoskeletal:         General: No deformity. Normal range of motion.      Cervical back: Normal range of motion and neck supple.   Lymphadenopathy:      Cervical: No cervical adenopathy.   Skin:     General: Skin is warm and dry.      Capillary Refill: Capillary refill takes less than 2 seconds.      Findings: No rash.   Neurological:      Mental Status: He is alert and oriented to person, place, and time.      Cranial Nerves: No cranial nerve deficit.   Psychiatric:         Behavior: Behavior normal.         Thought Content: Thought content normal.         Judgment: Judgment normal.       Lab Results   Component Value Date    TRIG 65 02/25/2022    HDL 66 (H) 02/25/2022    LDL 92 02/25/2022    VLDL 13 02/25/2022            HEALTH RISK ASSESSMENT    Smoking Status:  Social History     Tobacco Use   Smoking Status Never Smoker   Smokeless Tobacco Never Used     Alcohol Consumption:  Social History     Substance and Sexual Activity   Alcohol Use Yes    Comment: SOCIALLY     Fall Risk Screen:    [facility] Fall Risk Assessment has not been completed.    Depression Screening:  PHQ-2/PHQ-9 Depression Screening 3/4/2022   Little interest or pleasure in doing things 0   Feeling down, depressed, or hopeless 0   Trouble falling or staying asleep, or sleeping too much 0   Feeling tired or having little energy 0   Poor appetite or overeating 0   Feeling bad about yourself - or that you are a failure or have let yourself or your family down 0   Trouble concentrating on things, such as reading the newspaper or watching television 0   Moving or speaking so slowly that other people could have noticed. Or the opposite - being so fidgety or restless that you have been moving around a lot more than usual 0   Thoughts that you would be better off dead, or of hurting yourself in some way 0   Total Score 0   If you checked off  any problems, how difficult have these problems made it for you to do your work, take care of things at home, or get along with other people? Not difficult at all       Health Habits and Functional and Cognitive Screening:  Functional & Cognitive Status 3/4/2022   Do you have difficulty preparing food and eating? No   Do you have difficulty bathing yourself, getting dressed or grooming yourself? No   Do you have difficulty using the toilet? No   Do you have difficulty moving around from place to place? No   Do you have trouble with steps or getting out of a bed or a chair? No   Current Diet Well Balanced Diet   Dental Exam Up to date   Eye Exam Up to date   Exercise (times per week) 3 times per week   Current Exercises Include Walking   Current Exercise Activities Include -   Do you need help using the phone?  No   Are you deaf or do you have serious difficulty hearing?  No   Do you need help with transportation? No   Do you need help shopping? No   Do you need help preparing meals?  No   Do you need help with housework?  No   Do you need help with laundry? No   Do you need help taking your medications? No   Do you need help managing money? No   Do you ever drive or ride in a car without wearing a seat belt? No   Have you felt unusual stress, anger or loneliness in the last month? No   Who do you live with? Spouse   If you need help, do you have trouble finding someone available to you? No   Have you been bothered in the last four weeks by sexual problems? No   Do you have difficulty concentrating, remembering or making decisions? No       Age-appropriate Screening Schedule:  Refer to the list below for future screening recommendations based on patient's age, sex and/or medical conditions. Orders for these recommended tests are listed in the plan section. The patient has been provided with a written plan.    Health Maintenance   Topic Date Due   • LIPID PANEL  02/25/2023   • TDAP/TD VACCINES (2 - Td or Tdap)  12/11/2025   • INFLUENZA VACCINE  Completed   • ZOSTER VACCINE  Completed              Assessment/Plan   CMS Preventative Services Quick Reference  Risk Factors Identified During Encounter  Cardiovascular Disease  Chronic Pain   Inactivity/Sedentary  The above risks/problems have been discussed with the patient.  Follow up actions/plans if indicated are seen below in the Assessment/Plan Section.  Pertinent information has been shared with the patient in the After Visit Summary.    Diagnoses and all orders for this visit:    1. Medicare annual wellness visit, subsequent (Primary)  Comments:  doing well. keep up healthy lifestyle choices and staying active.     2. Hypothyroidism, unspecified type  Comments:  recheck tsh in 6 weeks.   Orders:  -     TSH Rfx On Abnormal To Free T4; Future    3. Essential hypertension  Comments:  continue current medications    4. Gastroesophageal reflux disease, unspecified whether esophagitis present  Comments:  trial of ppi for a few months. if persists we will pursue secondary workup.     Other orders  -     pantoprazole (PROTONIX) 40 MG EC tablet; Take 1 tablet by mouth Daily.  Dispense: 90 tablet; Refill: 3        Follow Up:   No follow-ups on file.     An After Visit Summary and PPPS were made available to the patient.        I spent  minutes caring for John on this date of service. This time includes time spent by me in the following activities:preparing for the visit, reviewing tests, obtaining and/or reviewing a separately obtained history, performing a medically appropriate examination and/or evaluation , counseling and educating the patient/family/caregiver, ordering medications, tests, or procedures and documenting information in the medical record

## 2022-04-15 ENCOUNTER — LAB (OUTPATIENT)
Dept: FAMILY MEDICINE CLINIC | Facility: CLINIC | Age: 67
End: 2022-04-15

## 2022-04-15 DIAGNOSIS — E03.9 HYPOTHYROIDISM, UNSPECIFIED TYPE: ICD-10-CM

## 2022-04-15 LAB
T4 FREE SERPL-MCNC: 2.02 NG/DL (ref 0.93–1.7)
TSH SERPL DL<=0.05 MIU/L-ACNC: 0.18 UIU/ML (ref 0.27–4.2)

## 2022-04-15 PROCEDURE — 84443 ASSAY THYROID STIM HORMONE: CPT | Performed by: PHYSICIAN ASSISTANT

## 2022-04-15 PROCEDURE — 36415 COLL VENOUS BLD VENIPUNCTURE: CPT

## 2022-04-15 PROCEDURE — 84439 ASSAY OF FREE THYROXINE: CPT | Performed by: PHYSICIAN ASSISTANT

## 2022-04-18 RX ORDER — LEVOTHYROXINE SODIUM 0.12 MG/1
125 TABLET ORAL DAILY
Qty: 90 TABLET | Refills: 3 | Status: SHIPPED | OUTPATIENT
Start: 2022-04-18 | End: 2022-12-26

## 2022-04-21 PROBLEM — Z86.010 PERSONAL HISTORY OF COLONIC POLYPS: Status: ACTIVE | Noted: 2017-04-24

## 2022-05-30 RX ORDER — LEVOTHYROXINE SODIUM 137 UG/1
TABLET ORAL
Qty: 90 TABLET | Refills: 0 | Status: SHIPPED | OUTPATIENT
Start: 2022-05-30 | End: 2022-12-26

## 2022-06-06 DIAGNOSIS — E03.9 HYPOTHYROIDISM, UNSPECIFIED TYPE: Primary | ICD-10-CM

## 2022-06-08 ENCOUNTER — TELEPHONE (OUTPATIENT)
Dept: FAMILY MEDICINE CLINIC | Facility: CLINIC | Age: 67
End: 2022-06-08

## 2022-06-08 NOTE — TELEPHONE ENCOUNTER
UNABLE TO WARM TRANSFER    Caller: DARWIN REEDER    Relationship to patient: Emergency Contact    Best call back number: [phone]       Type of visit: LAB   Requested date: 6/9/2022 AROUND 9 AM OR AFTER OR Friday MORNING       Additional notes:    WOULD LIKE TO KNOW IF MAR WILL NEED TO BE NPO FOR THYROID LAB

## 2022-06-09 ENCOUNTER — LAB (OUTPATIENT)
Dept: FAMILY MEDICINE CLINIC | Facility: CLINIC | Age: 67
End: 2022-06-09

## 2022-06-09 DIAGNOSIS — E03.9 HYPOTHYROIDISM, UNSPECIFIED TYPE: ICD-10-CM

## 2022-06-09 LAB — TSH SERPL DL<=0.05 MIU/L-ACNC: 1.08 UIU/ML (ref 0.27–4.2)

## 2022-06-09 PROCEDURE — 36415 COLL VENOUS BLD VENIPUNCTURE: CPT

## 2022-06-09 PROCEDURE — 84443 ASSAY THYROID STIM HORMONE: CPT | Performed by: PHYSICIAN ASSISTANT

## 2022-06-17 ENCOUNTER — ON CAMPUS - OUTPATIENT (AMBULATORY)
Dept: URBAN - METROPOLITAN AREA HOSPITAL 2 | Facility: HOSPITAL | Age: 67
End: 2022-06-17
Payer: MEDICARE

## 2022-06-17 VITALS
TEMPERATURE: 97.8 F | SYSTOLIC BLOOD PRESSURE: 115 MMHG | DIASTOLIC BLOOD PRESSURE: 71 MMHG | HEART RATE: 53 BPM | SYSTOLIC BLOOD PRESSURE: 106 MMHG | HEART RATE: 54 BPM | HEIGHT: 69 IN | HEART RATE: 52 BPM | OXYGEN SATURATION: 99 % | DIASTOLIC BLOOD PRESSURE: 68 MMHG | RESPIRATION RATE: 18 BRPM | OXYGEN SATURATION: 100 % | DIASTOLIC BLOOD PRESSURE: 72 MMHG | HEART RATE: 71 BPM | HEART RATE: 56 BPM | DIASTOLIC BLOOD PRESSURE: 70 MMHG | HEART RATE: 78 BPM | DIASTOLIC BLOOD PRESSURE: 65 MMHG | SYSTOLIC BLOOD PRESSURE: 121 MMHG | WEIGHT: 163 LBS | DIASTOLIC BLOOD PRESSURE: 77 MMHG | SYSTOLIC BLOOD PRESSURE: 109 MMHG | HEART RATE: 73 BPM | OXYGEN SATURATION: 96 % | RESPIRATION RATE: 16 BRPM | OXYGEN SATURATION: 98 % | SYSTOLIC BLOOD PRESSURE: 116 MMHG | SYSTOLIC BLOOD PRESSURE: 122 MMHG | DIASTOLIC BLOOD PRESSURE: 67 MMHG | SYSTOLIC BLOOD PRESSURE: 112 MMHG | OXYGEN SATURATION: 97 %

## 2022-06-17 DIAGNOSIS — Z86.010 PERSONAL HISTORY OF COLONIC POLYPS: ICD-10-CM

## 2022-06-17 DIAGNOSIS — K64.1 SECOND DEGREE HEMORRHOIDS: ICD-10-CM

## 2022-06-17 PROCEDURE — G0105 COLORECTAL SCRN; HI RISK IND: HCPCS | Performed by: INTERNAL MEDICINE

## 2022-07-01 RX ORDER — AMLODIPINE BESYLATE 5 MG/1
TABLET ORAL
Qty: 180 TABLET | Refills: 3 | Status: SHIPPED | OUTPATIENT
Start: 2022-07-01 | End: 2023-04-05 | Stop reason: SDUPTHER

## 2022-07-01 RX ORDER — DOXAZOSIN MESYLATE 4 MG/1
TABLET ORAL
Qty: 90 TABLET | Refills: 3 | Status: SHIPPED | OUTPATIENT
Start: 2022-07-01

## 2022-07-18 RX ORDER — LEVOTHYROXINE SODIUM 0.15 MG/1
TABLET ORAL
Qty: 90 TABLET | Refills: 3 | Status: SHIPPED | OUTPATIENT
Start: 2022-07-18 | End: 2022-12-26

## 2022-10-06 NOTE — TELEPHONE ENCOUNTER
Pt is coming in 1/28/20 for fasting labs for wellness exam. Please put lab order in. Thank you.   She should have refills.

## 2022-12-26 RX ORDER — LEVOTHYROXINE SODIUM 0.15 MG/1
TABLET ORAL
Qty: 90 TABLET | Refills: 3 | Status: SHIPPED | OUTPATIENT
Start: 2022-12-26 | End: 2023-02-28 | Stop reason: SDUPTHER

## 2023-02-24 ENCOUNTER — TELEPHONE (OUTPATIENT)
Dept: FAMILY MEDICINE CLINIC | Facility: CLINIC | Age: 68
End: 2023-02-24
Payer: MEDICARE

## 2023-02-24 NOTE — TELEPHONE ENCOUNTER
Caller: John Jones    Relationship: Self    Best call back number: [phone]    What is the best time to reach you: ANYTIME    Who are you requesting to speak with (clinical staff, provider,  specific staff member): CLINICAL STAFF    Do you know the name of the person who called: PATIENT     What was the call regarding: PATIENT IS TRYING TO REFILL HIS LEVOTHYROXINE AND IS NOT SURE WHAT DOSAGE HE IS SUPPOSED TO BE TAKING    Do you require a callback: YES

## 2023-02-27 RX ORDER — PANTOPRAZOLE SODIUM 40 MG/1
40 TABLET, DELAYED RELEASE ORAL DAILY
Qty: 90 TABLET | Refills: 0 | Status: SHIPPED | OUTPATIENT
Start: 2023-02-27

## 2023-02-28 RX ORDER — LEVOTHYROXINE SODIUM 0.15 MG/1
150 TABLET ORAL DAILY
Qty: 90 TABLET | Refills: 3 | Status: SHIPPED | OUTPATIENT
Start: 2023-02-28 | End: 2023-03-21

## 2023-03-07 ENCOUNTER — TELEPHONE (OUTPATIENT)
Dept: FAMILY MEDICINE CLINIC | Facility: CLINIC | Age: 68
End: 2023-03-07
Payer: MEDICARE

## 2023-03-07 NOTE — TELEPHONE ENCOUNTER
UNABLE TO WARM TRANSFER    Caller: John Jones    Relationship to patient: Self    Best call back number:[phone]       Type of visit:LAB     Requested date: BEFORE 3/21/2023 MEDICARE WELLNESS

## 2023-03-13 ENCOUNTER — TELEPHONE (OUTPATIENT)
Dept: FAMILY MEDICINE CLINIC | Facility: CLINIC | Age: 68
End: 2023-03-13
Payer: MEDICARE

## 2023-03-13 DIAGNOSIS — Z00.00 MEDICARE ANNUAL WELLNESS VISIT, SUBSEQUENT: ICD-10-CM

## 2023-03-13 DIAGNOSIS — Z12.5 SCREENING FOR PROSTATE CANCER: ICD-10-CM

## 2023-03-13 DIAGNOSIS — E03.9 HYPOTHYROIDISM, UNSPECIFIED TYPE: Primary | ICD-10-CM

## 2023-03-15 ENCOUNTER — LAB (OUTPATIENT)
Dept: FAMILY MEDICINE CLINIC | Facility: CLINIC | Age: 68
End: 2023-03-15
Payer: MEDICARE

## 2023-03-15 DIAGNOSIS — E03.9 HYPOTHYROIDISM, UNSPECIFIED TYPE: ICD-10-CM

## 2023-03-15 DIAGNOSIS — Z00.00 MEDICARE ANNUAL WELLNESS VISIT, SUBSEQUENT: ICD-10-CM

## 2023-03-15 DIAGNOSIS — Z12.5 SCREENING FOR PROSTATE CANCER: ICD-10-CM

## 2023-03-15 LAB
ALBUMIN SERPL-MCNC: 4.3 G/DL (ref 3.5–5.2)
ALBUMIN/GLOB SERPL: 1.7 G/DL
ALP SERPL-CCNC: 66 U/L (ref 39–117)
ALT SERPL W P-5'-P-CCNC: 18 U/L (ref 1–41)
ANION GAP SERPL CALCULATED.3IONS-SCNC: 8.3 MMOL/L (ref 5–15)
AST SERPL-CCNC: 14 U/L (ref 1–40)
BASOPHILS # BLD AUTO: 0.04 10*3/MM3 (ref 0–0.2)
BASOPHILS NFR BLD AUTO: 0.8 % (ref 0–1.5)
BILIRUB SERPL-MCNC: 0.6 MG/DL (ref 0–1.2)
BUN SERPL-MCNC: 21 MG/DL (ref 8–23)
BUN/CREAT SERPL: 19.6 (ref 7–25)
CALCIUM SPEC-SCNC: 9.7 MG/DL (ref 8.6–10.5)
CHLORIDE SERPL-SCNC: 106 MMOL/L (ref 98–107)
CHOLEST SERPL-MCNC: 184 MG/DL (ref 0–200)
CO2 SERPL-SCNC: 28.7 MMOL/L (ref 22–29)
CREAT SERPL-MCNC: 1.07 MG/DL (ref 0.76–1.27)
DEPRECATED RDW RBC AUTO: 41.7 FL (ref 37–54)
EGFRCR SERPLBLD CKD-EPI 2021: 75.6 ML/MIN/1.73
EOSINOPHIL # BLD AUTO: 0.14 10*3/MM3 (ref 0–0.4)
EOSINOPHIL NFR BLD AUTO: 2.8 % (ref 0.3–6.2)
ERYTHROCYTE [DISTWIDTH] IN BLOOD BY AUTOMATED COUNT: 12.7 % (ref 12.3–15.4)
GLOBULIN UR ELPH-MCNC: 2.5 GM/DL
GLUCOSE SERPL-MCNC: 89 MG/DL (ref 65–99)
HCT VFR BLD AUTO: 42.1 % (ref 37.5–51)
HDLC SERPL-MCNC: 70 MG/DL (ref 40–60)
HGB BLD-MCNC: 14.6 G/DL (ref 13–17.7)
IMM GRANULOCYTES # BLD AUTO: 0.01 10*3/MM3 (ref 0–0.05)
IMM GRANULOCYTES NFR BLD AUTO: 0.2 % (ref 0–0.5)
LDLC SERPL CALC-MCNC: 102 MG/DL (ref 0–100)
LDLC/HDLC SERPL: 1.44 {RATIO}
LYMPHOCYTES # BLD AUTO: 1.09 10*3/MM3 (ref 0.7–3.1)
LYMPHOCYTES NFR BLD AUTO: 21.5 % (ref 19.6–45.3)
MCH RBC QN AUTO: 31.1 PG (ref 26.6–33)
MCHC RBC AUTO-ENTMCNC: 34.7 G/DL (ref 31.5–35.7)
MCV RBC AUTO: 89.8 FL (ref 79–97)
MONOCYTES # BLD AUTO: 0.51 10*3/MM3 (ref 0.1–0.9)
MONOCYTES NFR BLD AUTO: 10 % (ref 5–12)
NEUTROPHILS NFR BLD AUTO: 3.29 10*3/MM3 (ref 1.7–7)
NEUTROPHILS NFR BLD AUTO: 64.7 % (ref 42.7–76)
NRBC BLD AUTO-RTO: 0 /100 WBC (ref 0–0.2)
PLATELET # BLD AUTO: 284 10*3/MM3 (ref 140–450)
PMV BLD AUTO: 9.7 FL (ref 6–12)
POTASSIUM SERPL-SCNC: 4.6 MMOL/L (ref 3.5–5.2)
PROT SERPL-MCNC: 6.8 G/DL (ref 6–8.5)
PSA SERPL-MCNC: 1.26 NG/ML (ref 0–4)
RBC # BLD AUTO: 4.69 10*6/MM3 (ref 4.14–5.8)
SODIUM SERPL-SCNC: 143 MMOL/L (ref 136–145)
T4 FREE SERPL-MCNC: 2.29 NG/DL (ref 0.93–1.7)
TRIGL SERPL-MCNC: 65 MG/DL (ref 0–150)
TSH SERPL DL<=0.05 MIU/L-ACNC: 0.15 UIU/ML (ref 0.27–4.2)
VLDLC SERPL-MCNC: 12 MG/DL (ref 5–40)
WBC NRBC COR # BLD: 5.08 10*3/MM3 (ref 3.4–10.8)

## 2023-03-15 PROCEDURE — G0103 PSA SCREENING: HCPCS | Performed by: PHYSICIAN ASSISTANT

## 2023-03-15 PROCEDURE — 84439 ASSAY OF FREE THYROXINE: CPT | Performed by: PHYSICIAN ASSISTANT

## 2023-03-15 PROCEDURE — 80053 COMPREHEN METABOLIC PANEL: CPT | Performed by: PHYSICIAN ASSISTANT

## 2023-03-15 PROCEDURE — 36415 COLL VENOUS BLD VENIPUNCTURE: CPT

## 2023-03-15 PROCEDURE — 80061 LIPID PANEL: CPT | Performed by: PHYSICIAN ASSISTANT

## 2023-03-15 PROCEDURE — 85025 COMPLETE CBC W/AUTO DIFF WBC: CPT | Performed by: PHYSICIAN ASSISTANT

## 2023-03-15 PROCEDURE — 84443 ASSAY THYROID STIM HORMONE: CPT | Performed by: PHYSICIAN ASSISTANT

## 2023-03-21 ENCOUNTER — OFFICE VISIT (OUTPATIENT)
Dept: FAMILY MEDICINE CLINIC | Facility: CLINIC | Age: 68
End: 2023-03-21
Payer: MEDICARE

## 2023-03-21 VITALS
HEART RATE: 78 BPM | HEIGHT: 70 IN | WEIGHT: 176 LBS | OXYGEN SATURATION: 97 % | BODY MASS INDEX: 25.2 KG/M2 | DIASTOLIC BLOOD PRESSURE: 80 MMHG | SYSTOLIC BLOOD PRESSURE: 138 MMHG | RESPIRATION RATE: 16 BRPM

## 2023-03-21 DIAGNOSIS — E78.5 HYPERLIPIDEMIA, UNSPECIFIED HYPERLIPIDEMIA TYPE: ICD-10-CM

## 2023-03-21 DIAGNOSIS — I10 PRIMARY HYPERTENSION: ICD-10-CM

## 2023-03-21 DIAGNOSIS — Z00.00 MEDICARE ANNUAL WELLNESS VISIT, SUBSEQUENT: Primary | ICD-10-CM

## 2023-03-21 DIAGNOSIS — E03.9 HYPOTHYROIDISM, UNSPECIFIED TYPE: ICD-10-CM

## 2023-03-21 PROBLEM — R21 RASH: Status: RESOLVED | Noted: 2019-05-24 | Resolved: 2023-03-21

## 2023-03-21 RX ORDER — LEVOTHYROXINE SODIUM 0.12 MG/1
125 TABLET ORAL
Qty: 90 TABLET | Refills: 3 | Status: SHIPPED | OUTPATIENT
Start: 2023-03-21

## 2023-03-21 RX ORDER — LEVOTHYROXINE SODIUM 0.12 MG/1
TABLET ORAL
COMMUNITY
Start: 2023-02-25 | End: 2023-03-21 | Stop reason: SDUPTHER

## 2023-03-21 NOTE — PROGRESS NOTES
The ABCs of the Annual Wellness Visit  Subsequent Medicare Wellness Visit    Subjective    John Jones is a 67 y.o. male who presents for a Subsequent Medicare Wellness Visit.    The following portions of the patient's history were reviewed and   updated as appropriate: allergies, current medications, past family history, past medical history, past social history, past surgical history and problem list.    Compared to one year ago, the patient feels his physical   health is the same.    Compared to one year ago, the patient feels his mental   health is the same.    Recent Hospitalizations:  He was not admitted to the hospital during the last year.       Current Medical Providers:  Patient Care Team:  Michael Pérez PA-C as PCP - General (Physician Assistant)    Outpatient Medications Prior to Visit   Medication Sig Dispense Refill   • amLODIPine (NORVASC) 5 MG tablet TAKE ONE TABLET BY MOUTH TWICE A  tablet 3   • doxazosin (CARDURA) 4 MG tablet TAKE ONE TABLET BY MOUTH EVERY NIGHT AT BEDTIME 90 tablet 3   • pantoprazole (PROTONIX) 40 MG EC tablet Take 1 tablet by mouth Daily. 90 tablet 0   • levothyroxine (SYNTHROID, LEVOTHROID) 150 MCG tablet Take 1 tablet by mouth Daily. 90 tablet 3   • levothyroxine (SYNTHROID, LEVOTHROID) 125 MCG tablet  (Patient not taking: Reported on 3/21/2023)       No facility-administered medications prior to visit.       No opioid medication identified on active medication list. I have reviewed chart for other potential  high risk medication/s and harmful drug interactions in the elderly.          Aspirin is not on active medication list.  Aspirin use is not indicated based on review of current medical condition/s. Risk of harm outweighs potential benefits.  .    Patient Active Problem List   Diagnosis   • Cervical radiculopathy   • Muscle left arm weakness   • Welcome to Medicare preventive visit   • Essential hypertension   • White coat syndrome with hypertension   •  "Chromhidrosis   • Benign prostatic hyperplasia   • Biceps tendinitis, right   • Elevated blood pressure reading without diagnosis of hypertension   • Hammer toe   • Hyperlipidemia   • Microscopic hematuria   • Neck pain   • Rotator cuff tendonitis   • Shoulder pain   • Subacromial bursitis of right shoulder joint   • Hypertension   • Encounter for general adult medical examination without abnormal findings   • Prostate cancer screening   • Medicare annual wellness visit, subsequent   • Hypothyroidism   • Throat clearing   • Gastroesophageal reflux disease     Advance Care Planning  Advance Directive is not on file.  ACP discussion was held with the patient during this visit. Patient has an advance directive (not in EMR), copy requested. Patient does not have an advance directive, information provided.     Objective    Vitals:    03/21/23 0918   BP: 138/80   BP Location: Right arm   Patient Position: Sitting   Cuff Size: Adult   Pulse: 78   Resp: 16   SpO2: 97%   Weight: 79.8 kg (176 lb)   Height: 177.8 cm (70\")     Estimated body mass index is 25.25 kg/m² as calculated from the following:    Height as of this encounter: 177.8 cm (70\").    Weight as of this encounter: 79.8 kg (176 lb).    BMI is >= 25 and <30. (Overweight) The following options were offered after discussion;: exercise counseling/recommendations and nutrition counseling/recommendations      Does the patient have evidence of cognitive impairment? No    Lab Results   Component Value Date    TRIG 65 03/15/2023    HDL 70 (H) 03/15/2023     (H) 03/15/2023    VLDL 12 03/15/2023        HEALTH RISK ASSESSMENT    Smoking Status:  Social History     Tobacco Use   Smoking Status Never   Smokeless Tobacco Never     Alcohol Consumption:  Social History     Substance and Sexual Activity   Alcohol Use Yes    Comment: SOCIALLY     Fall Risk Screen:    STEADI Fall Risk Assessment was completed, and patient is at LOW risk for falls.Assessment completed " on:3/21/2023    Depression Screening:  PHQ-2/PHQ-9 Depression Screening 3/21/2023   Little Interest or Pleasure in Doing Things 0-->not at all   Feeling Down, Depressed or Hopeless 0-->not at all   PHQ-9: Brief Depression Severity Measure Score 0       Health Habits and Functional and Cognitive Screening:  Functional & Cognitive Status 3/21/2023   Do you have difficulty preparing food and eating? No   Do you have difficulty bathing yourself, getting dressed or grooming yourself? No   Do you have difficulty using the toilet? No   Do you have difficulty moving around from place to place? No   Do you have trouble with steps or getting out of a bed or a chair? No   Current Diet Well Balanced Diet   Dental Exam Up to date   Eye Exam Up to date   Exercise (times per week) -   Current Exercises Include Walking   Current Exercise Activities Include -   Do you need help using the phone?  No   Are you deaf or do you have serious difficulty hearing?  No   Do you need help with transportation? No   Do you need help shopping? No   Do you need help preparing meals?  No   Do you need help with housework?  No   Do you need help with laundry? No   Do you need help taking your medications? No   Do you need help managing money? No   Do you ever drive or ride in a car without wearing a seat belt? No   Have you felt unusual stress, anger or loneliness in the last month? No   Who do you live with? -   If you need help, do you have trouble finding someone available to you? No   Have you been bothered in the last four weeks by sexual problems? -   Do you have difficulty concentrating, remembering or making decisions? No       Age-appropriate Screening Schedule:  Refer to the list below for future screening recommendations based on patient's age, sex and/or medical conditions. Orders for these recommended tests are listed in the plan section. The patient has been provided with a written plan.    Health Maintenance   Topic Date Due   •  "HEPATITIS C SCREENING  03/21/2023 (Originally 4/3/2019)   • COVID-19 Vaccine (4 - Booster) 04/09/2023 (Originally 10/14/2022)   • LIPID PANEL  03/15/2024   • ANNUAL WELLNESS VISIT  03/21/2024   • TDAP/TD VACCINES (2 - Td or Tdap) 12/11/2025   • COLORECTAL CANCER SCREENING  06/17/2032   • INFLUENZA VACCINE  Completed   • Pneumococcal Vaccine 65+  Completed   • ZOSTER VACCINE  Completed                CMS Preventative Services Quick Reference  Risk Factors Identified During Encounter  Urinary Incontinence: Medication adjustment made  The above risks/problems have been discussed with the patient.  Pertinent information has been shared with the patient in the After Visit Summary.  An After Visit Summary and PPPS were made available to the patient.    Follow Up:   Next Medicare Wellness visit to be scheduled in 1 year.       Additional E&M Note during same encounter follows:  Patient has multiple medical problems which are significant and separately identifiable that require additional work above and beyond the Medicare Wellness Visit.      Chief Complaint  Medicare Wellness-subsequent    Subjective        HPI  John Jones is also being seen today for bph, hypothyroid, htn, and gerd. He had an over corrected thyroid. He states that he thinks he was taking the wrong dose over the past visit. He thinks he is taking 150 and should be on 125. gerd is well controlled on protonix. Urinary symptoms good on doxazosin. Home blood pressures around 125 systolic. No chest pain, soa, stomach pain.          Objective   Vital Signs:  /80 (BP Location: Right arm, Patient Position: Sitting, Cuff Size: Adult)   Pulse 78   Resp 16   Ht 177.8 cm (70\")   Wt 79.8 kg (176 lb)   SpO2 97%   BMI 25.25 kg/m²     Physical Exam  Constitutional:       Appearance: He is well-developed.   HENT:      Head: Normocephalic and atraumatic.      Right Ear: Tympanic membrane normal.      Left Ear: Tympanic membrane normal.      Nose: No " congestion.      Mouth/Throat:      Pharynx: No oropharyngeal exudate.   Eyes:      Conjunctiva/sclera: Conjunctivae normal.      Pupils: Pupils are equal, round, and reactive to light.   Cardiovascular:      Rate and Rhythm: Normal rate and regular rhythm.      Heart sounds: No murmur heard.  Pulmonary:      Effort: Pulmonary effort is normal.      Breath sounds: Normal breath sounds.   Abdominal:      General: Bowel sounds are normal.      Palpations: Abdomen is soft.      Tenderness: There is no abdominal tenderness.   Musculoskeletal:         General: No deformity. Normal range of motion.      Cervical back: Normal range of motion and neck supple.   Lymphadenopathy:      Cervical: No cervical adenopathy.   Skin:     General: Skin is warm and dry.      Findings: No rash.   Neurological:      Mental Status: He is alert and oriented to person, place, and time.      Cranial Nerves: No cranial nerve deficit.   Psychiatric:         Behavior: Behavior normal.         Thought Content: Thought content normal.         Judgment: Judgment normal.          The following data was reviewed by: Michael Pérez PA-C on 03/21/2023:  Common labs    Common Labs 3/15/23 3/15/23 3/15/23 3/15/23    0855 0855 0855 0855   Glucose  89     BUN  21     Creatinine  1.07     Sodium  143     Potassium  4.6     Chloride  106     Calcium  9.7     Albumin  4.3     Total Bilirubin  0.6     Alkaline Phosphatase  66     AST (SGOT)  14     ALT (SGPT)  18     WBC 5.08      Hemoglobin 14.6      Hematocrit 42.1      Platelets 284      Total Cholesterol   184    Triglycerides   65    HDL Cholesterol   70 (A)    LDL Cholesterol    102 (A)    PSA    1.260   (A) Abnormal value                       Assessment and Plan     Diagnoses and all orders for this visit:    1. Medicare annual wellness visit, subsequent (Primary)  Comments:  screenings utd and vaccines utd. healthy lifestyle choices.     2. Hypothyroidism, unspecified type  Comments:  adjust  levothyroxine to 125 mcg.   Orders:  -     TSH Rfx On Abnormal To Free T4; Future    3. Hyperlipidemia, unspecified hyperlipidemia type  Comments:  doing well. no changes.     4. Primary hypertension  Comments:  blood pressures good.     Other orders  -     levothyroxine (SYNTHROID, LEVOTHROID) 125 MCG tablet; Take 1 tablet by mouth Every Morning.  Dispense: 90 tablet; Refill: 3           I spent 45 minutes caring for John on this date of service. This time includes time spent by me in the following activities:preparing for the visit, reviewing tests, obtaining and/or reviewing a separately obtained history, performing a medically appropriate examination and/or evaluation , counseling and educating the patient/family/caregiver, ordering medications, tests, or procedures and documenting information in the medical record  Follow Up     Return in about 1 year (around 3/21/2024), or if symptoms worsen or fail to improve- recheck tsh at 6 week interval., for Medicare Wellness.  Patient was given instructions and counseling regarding his condition or for health maintenance advice. Please see specific information pulled into the AVS if appropriate.

## 2023-04-04 ENCOUNTER — TELEMEDICINE (OUTPATIENT)
Dept: FAMILY MEDICINE CLINIC | Facility: TELEHEALTH | Age: 68
End: 2023-04-04
Payer: MEDICARE

## 2023-04-04 DIAGNOSIS — Z20.818 PERTUSSIS EXPOSURE: Primary | ICD-10-CM

## 2023-04-04 PROCEDURE — 99213 OFFICE O/P EST LOW 20 MIN: CPT | Performed by: NURSE PRACTITIONER

## 2023-04-04 RX ORDER — AZITHROMYCIN 250 MG/1
TABLET, FILM COATED ORAL
Qty: 6 TABLET | Refills: 0 | Status: SHIPPED | OUTPATIENT
Start: 2023-04-04

## 2023-04-04 NOTE — PROGRESS NOTES
CHIEF COMPLAINT  Chief Complaint   Patient presents with   • Cough     Direct exposure to Pertussis.          HPI  John Jones is a 67 y.o. male  presents with complaint of recent exposure to Pertussis. His grandchildren currently have it and he has been around them in the last week. He reports a dry cough without fever or other associated symptoms.     Review of Systems   Constitutional: Negative.    HENT: Negative.    Respiratory: Positive for cough. Negative for shortness of breath, wheezing and stridor.    Cardiovascular: Negative.    Gastrointestinal: Negative.    Musculoskeletal: Negative.    Skin: Negative.    Allergic/Immunologic: Negative.    Neurological: Negative.    Hematological: Negative.    Psychiatric/Behavioral: Negative.        Past Medical History:   Diagnosis Date   • Arthritis    • Biceps tendon rupture     right   • BPH (benign prostatic hyperplasia)    • Cervical spinal stenosis    • Colon polyp ?    1 polyp about 15 years ago   • Early cataract    • History of hepatitis A     30 YEARS AGO   • Hypertension    • Hypothyroidism    • Infectious viral hepatitis 30 plus years ago    Hepatitis A   • Knee osteoarthritis    • Knee pain, bilateral    • Spinal stenosis     NUMBNESS/TINGLING LEFT ARM       Family History   Problem Relation Age of Onset   • Pancreatic cancer Mother    • Cancer Mother    • Heart disease Father         bladder cancer   • Arthritis Father    • Cancer Father    • Hyperlipidemia Father    • Thyroid disease Father    • No Known Problems Sister    • Liver cancer Brother    • Malig Hyperthermia Neg Hx        Social History     Socioeconomic History   • Marital status:    • Number of children: 2   • Years of education: 12   • Highest education level: High school graduate   Tobacco Use   • Smoking status: Never   • Smokeless tobacco: Never   Vaping Use   • Vaping Use: Never used   • Passive vaping exposure: Yes   Substance and Sexual Activity   • Alcohol use: Yes      Comment: SOCIALLY   • Drug use: No   • Sexual activity: Yes     Partners: Female         There were no vitals taken for this visit.    PHYSICAL EXAM  Physical Exam   Constitutional: He appears well-developed and well-nourished. He does not have a sickly appearance. He does not appear ill. No distress.   HENT:   Head: Normocephalic and atraumatic.   Right Ear: Hearing normal.   Left Ear: Hearing normal.   Nose: Nose normal.   Mouth/Throat: Mouth/Lips are normal.  Pulmonary/Chest: Effort normal.  No respiratory distress.  Neurological: He is alert.   Psychiatric: He has a normal mood and affect.   Vitals reviewed.      Results for orders placed or performed in visit on 03/15/23   TSH Rfx On Abnormal To Free T4    Specimen: Blood   Result Value Ref Range    TSH 0.153 (L) 0.270 - 4.200 uIU/mL   Comprehensive metabolic panel    Specimen: Blood   Result Value Ref Range    Glucose 89 65 - 99 mg/dL    BUN 21 8 - 23 mg/dL    Creatinine 1.07 0.76 - 1.27 mg/dL    Sodium 143 136 - 145 mmol/L    Potassium 4.6 3.5 - 5.2 mmol/L    Chloride 106 98 - 107 mmol/L    CO2 28.7 22.0 - 29.0 mmol/L    Calcium 9.7 8.6 - 10.5 mg/dL    Total Protein 6.8 6.0 - 8.5 g/dL    Albumin 4.3 3.5 - 5.2 g/dL    ALT (SGPT) 18 1 - 41 U/L    AST (SGOT) 14 1 - 40 U/L    Alkaline Phosphatase 66 39 - 117 U/L    Total Bilirubin 0.6 0.0 - 1.2 mg/dL    Globulin 2.5 gm/dL    A/G Ratio 1.7 g/dL    BUN/Creatinine Ratio 19.6 7.0 - 25.0    Anion Gap 8.3 5.0 - 15.0 mmol/L    eGFR 75.6 >60.0 mL/min/1.73   Lipid panel    Specimen: Blood   Result Value Ref Range    Total Cholesterol 184 0 - 200 mg/dL    Triglycerides 65 0 - 150 mg/dL    HDL Cholesterol 70 (H) 40 - 60 mg/dL    LDL Cholesterol  102 (H) 0 - 100 mg/dL    VLDL Cholesterol 12 5 - 40 mg/dL    LDL/HDL Ratio 1.44    PSA SCREENING    Specimen: Blood   Result Value Ref Range    PSA 1.260 0.000 - 4.000 ng/mL   CBC Auto Differential    Specimen: Blood   Result Value Ref Range    WBC 5.08 3.40 - 10.80 10*3/mm3    RBC  4.69 4.14 - 5.80 10*6/mm3    Hemoglobin 14.6 13.0 - 17.7 g/dL    Hematocrit 42.1 37.5 - 51.0 %    MCV 89.8 79.0 - 97.0 fL    MCH 31.1 26.6 - 33.0 pg    MCHC 34.7 31.5 - 35.7 g/dL    RDW 12.7 12.3 - 15.4 %    RDW-SD 41.7 37.0 - 54.0 fl    MPV 9.7 6.0 - 12.0 fL    Platelets 284 140 - 450 10*3/mm3    Neutrophil % 64.7 42.7 - 76.0 %    Lymphocyte % 21.5 19.6 - 45.3 %    Monocyte % 10.0 5.0 - 12.0 %    Eosinophil % 2.8 0.3 - 6.2 %    Basophil % 0.8 0.0 - 1.5 %    Immature Grans % 0.2 0.0 - 0.5 %    Neutrophils, Absolute 3.29 1.70 - 7.00 10*3/mm3    Lymphocytes, Absolute 1.09 0.70 - 3.10 10*3/mm3    Monocytes, Absolute 0.51 0.10 - 0.90 10*3/mm3    Eosinophils, Absolute 0.14 0.00 - 0.40 10*3/mm3    Basophils, Absolute 0.04 0.00 - 0.20 10*3/mm3    Immature Grans, Absolute 0.01 0.00 - 0.05 10*3/mm3    nRBC 0.0 0.0 - 0.2 /100 WBC   T4, Free    Specimen: Blood   Result Value Ref Range    Free T4 2.29 (H) 0.93 - 1.70 ng/dL       Diagnoses and all orders for this visit:    1. Pertussis exposure (Primary)  -     azithromycin (Zithromax Z-Isidro) 250 MG tablet; Take 2 tablets by mouth on day 1, then 1 tablet daily on days 2-5  Dispense: 6 tablet; Refill: 0    Mucinex DM as directed   Increase fuids and rest.   Avoid exposure to high risk individuals until symptoms resolve and mask when in public.     The use of a video visit has been reviewed with the patient and verbal informd consent has een obtained. Myself and John Jones participated in this visit. The patient is located in [address] [place] [address] [place] [facility]. I am located in [place], [place].I spent 15  minutes in the patient's chart for this visit.           NALDO Fuller  04/04/2023  16:48 EDT

## 2023-04-05 RX ORDER — AMLODIPINE BESYLATE 5 MG/1
TABLET ORAL
Qty: 180 TABLET | Refills: 3 | Status: SHIPPED | OUTPATIENT
Start: 2023-04-05

## 2023-04-05 NOTE — TELEPHONE ENCOUNTER
Caller: John Jones LUIS    Relationship: Self    Best call back number: [phone]    Requested Prescriptions:   Requested Prescriptions     Pending Prescriptions Disp Refills   • amLODIPine (NORVASC) 5 MG tablet 180 tablet 3     Sig: TAKE ONE TABLET BY MOUTH TWICE A DAY        Pharmacy where request should be sent: [facility] [address] [place], [place] [id number] [place] [phone]      Last office visit with prescribing clinician: 3/21/2023   Last telemedicine visit with prescribing clinician: 5/2/2023   Next office visit with prescribing clinician: Visit date not found     Additional details provided by patient: PATIENT HAS A WEEK SUPPLY LEFT    Does the patient have less than a 3 day supply:  [] Yes  [x] No    Would you like a call back once the refill request has been completed: [] Yes [x] No    If the office needs to give you a call back, can they leave a voicemail: [] Yes [x] No    Caprice Sam Rep   04/05/23 08:05 EDT

## 2023-04-28 RX ORDER — PANTOPRAZOLE SODIUM 40 MG/1
40 TABLET, DELAYED RELEASE ORAL DAILY
Qty: 90 TABLET | Refills: 0 | Status: SHIPPED | OUTPATIENT
Start: 2023-04-28

## 2023-04-28 RX ORDER — LEVOTHYROXINE SODIUM 0.12 MG/1
125 TABLET ORAL
Qty: 90 TABLET | Refills: 3 | Status: SHIPPED | OUTPATIENT
Start: 2023-04-28

## 2023-04-28 RX ORDER — DOXAZOSIN MESYLATE 4 MG/1
4 TABLET ORAL
Qty: 90 TABLET | Refills: 3 | Status: SHIPPED | OUTPATIENT
Start: 2023-04-28

## 2023-04-28 NOTE — TELEPHONE ENCOUNTER
Caller: John Jones LUIS    Relationship: Self    Best call back number: [phone]    Requested Prescriptions:   Requested Prescriptions     Pending Prescriptions Disp Refills   • doxazosin (CARDURA) 4 MG tablet 90 tablet 3     Sig: Take 1 tablet by mouth every night at bedtime.   • levothyroxine (SYNTHROID, LEVOTHROID) 125 MCG tablet 90 tablet 3     Sig: Take 1 tablet by mouth Every Morning.   • pantoprazole (PROTONIX) 40 MG EC tablet 90 tablet 0     Sig: Take 1 tablet by mouth Daily.        Pharmacy where request should be sent: [address]  [phone] [place] [phone]      Last office visit with prescribing clinician: 3/21/2023   Last telemedicine visit with prescribing clinician: 5/2/2023   Next office visit with prescribing clinician: Visit date not found     Additional details provided by patient: PATIENT HAS A NEW PHARMACY AND NEEDS NEW PRESCRIPTIONS SENT OVER    Does the patient have less than a 3 day supply:  [] Yes  [x] No    Caprice Ronquillo Rep   04/28/23 15:47 EDT

## 2023-05-02 ENCOUNTER — LAB (OUTPATIENT)
Dept: FAMILY MEDICINE CLINIC | Facility: CLINIC | Age: 68
End: 2023-05-02
Payer: MEDICARE

## 2023-05-02 DIAGNOSIS — E03.9 HYPOTHYROIDISM, UNSPECIFIED TYPE: ICD-10-CM

## 2023-05-02 LAB — TSH SERPL DL<=0.05 MIU/L-ACNC: 0.99 UIU/ML (ref 0.27–4.2)

## 2023-05-02 PROCEDURE — 84443 ASSAY THYROID STIM HORMONE: CPT | Performed by: PHYSICIAN ASSISTANT

## 2023-05-02 PROCEDURE — 36415 COLL VENOUS BLD VENIPUNCTURE: CPT

## 2023-08-15 RX ORDER — PANTOPRAZOLE SODIUM 40 MG/1
TABLET, DELAYED RELEASE ORAL
Qty: 90 TABLET | Refills: 0 | Status: SHIPPED | OUTPATIENT
Start: 2023-08-15

## 2023-10-09 NOTE — ASSESSMENT & PLAN NOTE
Caller: Helga Cardenas    Relationship: Self    Best call back number: [phone]    What is the best time to reach you: AS SOON AS POSSIBLE     Who are you requesting to speak with (clinical staff, provider,  specific staff member): PAIGE ALEMAN         What was the call regarding: THE PATIENT WOULD LIKE TO TALK TO THEM ABOUT HER RECENT EMERGENCY ROOM VISIT OVER THE WEEKEND        Hypertension is improving with treatment.  Continue current treatment regimen.  Dietary sodium restriction.  Regular aerobic exercise.  Blood pressure will be reassessed at the next regular appointment.

## 2023-11-06 RX ORDER — PANTOPRAZOLE SODIUM 40 MG/1
TABLET, DELAYED RELEASE ORAL
Qty: 90 TABLET | Refills: 0 | Status: SHIPPED | OUTPATIENT
Start: 2023-11-06

## 2023-12-15 ENCOUNTER — TELEPHONE (OUTPATIENT)
Dept: FAMILY MEDICINE CLINIC | Facility: CLINIC | Age: 68
End: 2023-12-15
Payer: MEDICARE

## 2023-12-15 DIAGNOSIS — M54.12 CERVICAL RADICULOPATHY: Primary | ICD-10-CM

## 2023-12-15 NOTE — TELEPHONE ENCOUNTER
ARTERIAL SURGICAL FUSION / NECK     PAIN IN SHOULDER- BLADE     HAD SURGERY IN THE PAST       Caller:       DARWIN REEDER () [phone] (Home)       What is the medical concern/diagnosis:     What specialty or service is being requested:     What is the provider, practice or medical service name: DR GALAVIZ / [facility] /     Phone Fax E-mail Address   [phone] Not available [address] [place] [address]       Any additional details: PLEASE CALL IF YOU HAVE ANY QUESTIONS

## 2023-12-20 ENCOUNTER — TELEPHONE (OUTPATIENT)
Dept: FAMILY MEDICINE CLINIC | Facility: CLINIC | Age: 68
End: 2023-12-20
Payer: MEDICARE

## 2023-12-20 DIAGNOSIS — M54.12 CERVICAL RADICULOPATHY: Primary | ICD-10-CM

## 2023-12-20 NOTE — TELEPHONE ENCOUNTER
Caller: DARWIN REEDER    Relationship: Emergency Contact    Best call back number: [phone]    What orders are you requesting (i.e. lab or imaging): IMAGING FOR NEUROSURGEON - POSSIBLE CT OR X-RAY DUE TO THE PATIENT HAVING METAL IN HIS NECK    In what timeframe would the patient need to come in: ASAP    Where will you receive your lab/imaging services: [facility] IF IT IS MORE THAN JUST AN X-RAY    Additional notes: NEUROSURGERY WOULD LIKE FOR THE PATIENT TO HAVE IMAGING DONE BEFORE HIS APPOINTMENT ON 02/08/2024.

## 2024-01-02 ENCOUNTER — TELEPHONE (OUTPATIENT)
Dept: FAMILY MEDICINE CLINIC | Facility: CLINIC | Age: 69
End: 2024-01-02
Payer: MEDICARE

## 2024-01-02 NOTE — TELEPHONE ENCOUNTER
----- Message from José Bravo MD sent at 1/2/2024 12:32 PM EST -----  Please let patient know that his CT scan showed chronic changes. He should still his specialist for further follow up.

## 2024-02-07 RX ORDER — PANTOPRAZOLE SODIUM 40 MG/1
TABLET, DELAYED RELEASE ORAL
Qty: 90 TABLET | Refills: 0 | Status: SHIPPED | OUTPATIENT
Start: 2024-02-07

## 2024-03-25 ENCOUNTER — TELEPHONE (OUTPATIENT)
Dept: FAMILY MEDICINE CLINIC | Facility: CLINIC | Age: 69
End: 2024-03-25

## 2024-03-25 DIAGNOSIS — I10 ESSENTIAL HYPERTENSION: ICD-10-CM

## 2024-03-25 DIAGNOSIS — E78.5 HYPERLIPIDEMIA, UNSPECIFIED HYPERLIPIDEMIA TYPE: ICD-10-CM

## 2024-03-25 DIAGNOSIS — E03.9 HYPOTHYROIDISM, UNSPECIFIED TYPE: ICD-10-CM

## 2024-03-25 DIAGNOSIS — Z12.5 SCREENING FOR PROSTATE CANCER: Primary | ICD-10-CM

## 2024-03-25 NOTE — TELEPHONE ENCOUNTER
Caller: John Jones    Relationship: Self    Best call back number:   John Jones LUIS (Self) [phone] (Home)       What orders are you requesting (i.e. lab or imaging): LAB ORDER PRIOR TO APPT     In what timeframe would the patient need to come in: THIS WEEK       Where will you receive your lab/imaging services:       PLEASE ORDER LABS, AND CALL TO SCHEDULE LAB APPT

## 2024-03-27 PROCEDURE — 85025 COMPLETE CBC W/AUTO DIFF WBC: CPT | Performed by: STUDENT IN AN ORGANIZED HEALTH CARE EDUCATION/TRAINING PROGRAM

## 2024-03-27 PROCEDURE — 84443 ASSAY THYROID STIM HORMONE: CPT | Performed by: STUDENT IN AN ORGANIZED HEALTH CARE EDUCATION/TRAINING PROGRAM

## 2024-03-27 PROCEDURE — G0103 PSA SCREENING: HCPCS | Performed by: STUDENT IN AN ORGANIZED HEALTH CARE EDUCATION/TRAINING PROGRAM

## 2024-03-27 PROCEDURE — 80061 LIPID PANEL: CPT | Performed by: STUDENT IN AN ORGANIZED HEALTH CARE EDUCATION/TRAINING PROGRAM

## 2024-03-27 PROCEDURE — 80053 COMPREHEN METABOLIC PANEL: CPT | Performed by: STUDENT IN AN ORGANIZED HEALTH CARE EDUCATION/TRAINING PROGRAM

## 2024-04-01 ENCOUNTER — OFFICE VISIT (OUTPATIENT)
Dept: FAMILY MEDICINE CLINIC | Facility: CLINIC | Age: 69
End: 2024-04-01
Payer: MEDICARE

## 2024-04-01 VITALS
HEART RATE: 81 BPM | SYSTOLIC BLOOD PRESSURE: 128 MMHG | BODY MASS INDEX: 27.9 KG/M2 | RESPIRATION RATE: 16 BRPM | OXYGEN SATURATION: 97 % | HEIGHT: 69 IN | WEIGHT: 188.38 LBS | DIASTOLIC BLOOD PRESSURE: 76 MMHG

## 2024-04-01 DIAGNOSIS — E03.9 HYPOTHYROIDISM, UNSPECIFIED TYPE: ICD-10-CM

## 2024-04-01 DIAGNOSIS — I10 ESSENTIAL HYPERTENSION: ICD-10-CM

## 2024-04-01 DIAGNOSIS — E78.5 HYPERLIPIDEMIA, UNSPECIFIED HYPERLIPIDEMIA TYPE: ICD-10-CM

## 2024-04-01 DIAGNOSIS — Z00.00 MEDICARE ANNUAL WELLNESS VISIT, SUBSEQUENT: Primary | ICD-10-CM

## 2024-04-01 PROBLEM — Z12.5 PROSTATE CANCER SCREENING: Status: RESOLVED | Noted: 2019-02-27 | Resolved: 2024-04-01

## 2024-04-01 NOTE — PROGRESS NOTES
The ABCs of the Annual Wellness Visit  Subsequent Medicare Wellness Visit    Subjective    John Jones is a 68 y.o. male who presents for a Subsequent Medicare Wellness Visit.    The following portions of the patient's history were reviewed and   updated as appropriate: allergies, current medications, past family history, past medical history, past social history, past surgical history, and problem list.    Compared to one year ago, the patient feels his physical   health is the same.    Compared to one year ago, the patient feels his mental   health is the same.    Recent Hospitalizations:  He was not admitted to the hospital during the last year.       Current Medical Providers:  Patient Care Team:  José Bravo MD as PCP - General (Internal Medicine)    Outpatient Medications Prior to Visit   Medication Sig Dispense Refill    amLODIPine (NORVASC) 5 MG tablet TAKE ONE TABLET BY MOUTH TWICE A  tablet 3    Cyanocobalamin (VITAMIN B12 PO) Take  by mouth Daily.      doxazosin (CARDURA) 4 MG tablet Take 1 tablet by mouth every night at bedtime. 90 tablet 3    levothyroxine (SYNTHROID, LEVOTHROID) 125 MCG tablet Take 1 tablet by mouth Every Morning. 90 tablet 3    pantoprazole (PROTONIX) 40 MG EC tablet Take 1 tablet by mouth once daily 90 tablet 0    azithromycin (Zithromax Z-Isidro) 250 MG tablet Take 2 tablets by mouth on day 1, then 1 tablet daily on days 2-5 6 tablet 0     No facility-administered medications prior to visit.       No opioid medication identified on active medication list. I have reviewed chart for other potential  high risk medication/s and harmful drug interactions in the elderly.        Aspirin is not on active medication list.  Aspirin use is not indicated based on review of current medical condition/s. Risk of harm outweighs potential benefits.  .    Patient Active Problem List   Diagnosis    Cervical radiculopathy    Muscle left arm weakness    Essential hypertension    White coat  "syndrome with hypertension    Chromhidrosis    Benign prostatic hyperplasia    Biceps tendinitis, right    Elevated blood pressure reading without diagnosis of hypertension    Hammer toe    Hyperlipidemia    Microscopic hematuria    Neck pain    Rotator cuff tendonitis    Shoulder pain    Subacromial bursitis of right shoulder joint    Hypertension    Encounter for general adult medical examination without abnormal findings    Medicare annual wellness visit, subsequent    Hypothyroidism    Throat clearing    Gastroesophageal reflux disease     Advance Care Planning   Advance Care Planning     Advance Directive is not on file.  ACP discussion was held with the patient during this visit. Patient does not have an advance directive, information provided.     Objective    Vitals:    24 0843   BP: 128/76   Pulse: 81   Resp: 16   SpO2: 97%   Weight: 85.4 kg (188 lb 6 oz)   Height: 175.3 cm (69\")     Estimated body mass index is 27.82 kg/m² as calculated from the following:    Height as of this encounter: 175.3 cm (69\").    Weight as of this encounter: 85.4 kg (188 lb 6 oz).    BMI is >= 25 and <30. (Overweight) The following options were offered after discussion;: weight loss educational material (shared in after visit summary) and exercise counseling/recommendations      Does the patient have evidence of cognitive impairment? No    Lab Results   Component Value Date    TRIG 73 2024    HDL 75 (H) 2024     (H) 2024    VLDL 13 2024        HEALTH RISK ASSESSMENT    Smoking Status:  Social History     Tobacco Use   Smoking Status Never   Smokeless Tobacco Never     Alcohol Consumption:  Social History     Substance and Sexual Activity   Alcohol Use Yes    Comment: On rare ocasions     Fall Risk Screen:    BRAYDON Fall Risk Assessment was completed, and patient is at LOW risk for falls.Assessment completed on:2024    Depression Screenin/1/2024     8:45 AM   PHQ-2/PHQ-9 Depression " Screening   Little Interest or Pleasure in Doing Things 0-->not at all   Feeling Down, Depressed or Hopeless 0-->not at all   PHQ-9: Brief Depression Severity Measure Score 0       Health Habits and Functional and Cognitive Screenin/1/2024     8:46 AM   Functional & Cognitive Status   Do you have difficulty preparing food and eating? No   Do you have difficulty bathing yourself, getting dressed or grooming yourself? No   Do you have difficulty using the toilet? No   Do you have difficulty moving around from place to place? No   Do you have trouble with steps or getting out of a bed or a chair? No   Current Diet Other        Current Diet Comment eating more fried food   Dental Exam Up to date   Eye Exam Up to date   Exercise (times per week) 3 times per week   Current Exercises Include Walking   Do you need help using the phone?  No   Are you deaf or do you have serious difficulty hearing?  No   Do you need help to go to places out of walking distance? No   Do you need help shopping? No   Do you need help preparing meals?  No   Do you need help with housework?  No   Do you need help with laundry? No   Do you need help taking your medications? No   Do you need help managing money? No   Do you ever drive or ride in a car without wearing a seat belt? No       Age-appropriate Screening Schedule:  Refer to the list below for future screening recommendations based on patient's age, sex and/or medical conditions. Orders for these recommended tests are listed in the plan section. The patient has been provided with a written plan.    Health Maintenance   Topic Date Due    RSV Vaccine - Adults (1 - 1-dose 60+ series) Never done    HEPATITIS C SCREENING  Never done    COVID-19 Vaccine (2023- season) 2023    INFLUENZA VACCINE  2024    LIPID PANEL  2025    ANNUAL WELLNESS VISIT  2025    BMI FOLLOWUP  2025    TDAP/TD VACCINES (2 - Td or Tdap) 2025    COLORECTAL CANCER SCREENING   "06/17/2032    Pneumococcal Vaccine 65+  Completed    ZOSTER VACCINE  Completed                  CMS Preventative Services Quick Reference  Risk Factors Identified During Encounter  None Identified  The above risks/problems have been discussed with the patient.  Pertinent information has been shared with the patient in the After Visit Summary.  An After Visit Summary and PPPS were made available to the patient.    Follow Up:   Next Medicare Wellness visit to be scheduled in 1 year.       Additional E&M Note during same encounter follows:  Patient has multiple medical problems which are significant and separately identifiable that require additional work above and beyond the Medicare Wellness Visit.      Chief Complaint  Medicare Wellness-subsequent    Subjective        HPI  John Jones is also being seen today for his hypertension, hyperlipidemia, hypothyroidism, GERD.    Hypertension  States that he has been monitoring his blood pressure at home.  States it is pretty close to high 120s to 70s.  States he has no syncopal episodes, chest pain, swelling in his legs or feet.  Has been taking his amlodipine 5 and Cardura for this.  States that he has not no side effects with his medication.  Cost is affordable.    Hypothyroidism  States he has been on 125 mcg daily.  States he has no difficulty with the medication.  Had a thyroid checked last week.    GERD/chronic throat clearing  States he has been on Protonix 40 mg daily.  States he has not had any issues with this.  States that he has not had any trouble with taking the medication.         Objective   Vital Signs:  /76   Pulse 81   Resp 16   Ht 175.3 cm (69\")   Wt 85.4 kg (188 lb 6 oz)   SpO2 97%   BMI 27.82 kg/m²     Physical Exam  Vitals and nursing note reviewed.   Constitutional:       General: He is not in acute distress.     Appearance: Normal appearance. He is not toxic-appearing.   HENT:      Head: Normocephalic and atraumatic.      Right Ear: " Tympanic membrane, ear canal and external ear normal.      Left Ear: Tympanic membrane, ear canal and external ear normal.      Nose: Nose normal. No congestion or rhinorrhea.      Mouth/Throat:      Mouth: Mucous membranes are moist.      Pharynx: No oropharyngeal exudate.   Eyes:      General: No scleral icterus.        Right eye: No discharge.         Left eye: No discharge.      Extraocular Movements: Extraocular movements intact.      Conjunctiva/sclera: Conjunctivae normal.      Pupils: Pupils are equal, round, and reactive to light.   Cardiovascular:      Rate and Rhythm: Normal rate and regular rhythm.      Pulses: Normal pulses.      Heart sounds: Normal heart sounds. No murmur heard.  Pulmonary:      Effort: Pulmonary effort is normal. No respiratory distress.      Breath sounds: Normal breath sounds. No wheezing.   Abdominal:      General: Abdomen is flat. Bowel sounds are normal. There is no distension.      Palpations: Abdomen is soft.      Tenderness: There is no abdominal tenderness. There is no guarding.   Musculoskeletal:         General: No swelling, tenderness or deformity. Normal range of motion.      Cervical back: Normal range of motion and neck supple. No rigidity or tenderness.   Lymphadenopathy:      Cervical: No cervical adenopathy.   Skin:     General: Skin is warm.      Capillary Refill: Capillary refill takes less than 2 seconds.   Neurological:      General: No focal deficit present.      Mental Status: He is alert and oriented to person, place, and time. Mental status is at baseline.      Cranial Nerves: No cranial nerve deficit.      Gait: Gait normal.   Psychiatric:         Mood and Affect: Mood normal.         Behavior: Behavior normal.         Thought Content: Thought content normal.         Judgment: Judgment normal.          The following data was reviewed by: José Bravo MD on 04/01/2024:  Common labs          3/27/2024    08:53   Common Labs   Glucose 74    BUN 27     Creatinine 1.10    Sodium 141    Potassium 4.8    Chloride 105    Calcium 9.4    Albumin 4.3    Total Bilirubin 0.6    Alkaline Phosphatase 58    AST (SGOT) 20    ALT (SGPT) 22    WBC 5.49    Hemoglobin 14.6    Hematocrit 44.2    Platelets 283    Total Cholesterol 211    Triglycerides 73    HDL Cholesterol 75    LDL Cholesterol  123    PSA 0.999      Data reviewed : Previous notes           Assessment and Plan   Diagnoses and all orders for this visit:    1. Medicare annual wellness visit, subsequent (Primary)    2. Essential hypertension    3. Hypothyroidism, unspecified type    4. Hyperlipidemia, unspecified hyperlipidemia type    For his annual wellness visit, he is up-to-date on all of his routine cancer screenings.  Discussed RSV vaccines and COVID-vaccine.    Hypertension  Currently well-controlled.  Continue amlodipine 5 and doxazosin 4 mg.  Blood work obtained and normal kidney function.  Discussed increasing fluid intake.    Hyperlipidemia  Currently diet controlled.  Continue monitoring.  His LDL is slightly a little bit more elevated than what it was a year ago so we will continue to watch.  But his healthy cholesterol is elevated 2.  No indication for treatment at this time.  If it does increase next year we will consider doing a coronary calcium score.    Hypothyroidism  Thyroid studies are in normal range.  Will continue to monitor and see how he is doing with that.  No hyper or hypothyroidism symptoms.         Follow Up   Return in about 1 year (around 4/1/2025) for Medicare Wellness.  Patient was given instructions and counseling regarding his condition or for health maintenance advice. Please see specific information pulled into the AVS if appropriate.

## 2024-04-10 ENCOUNTER — TRANSCRIBE ORDERS (OUTPATIENT)
Dept: PHYSICAL THERAPY | Facility: CLINIC | Age: 69
End: 2024-04-10
Payer: MEDICARE

## 2024-04-10 DIAGNOSIS — G56.01 CARPAL TUNNEL SYNDROME ON RIGHT: Primary | ICD-10-CM

## 2024-04-10 DIAGNOSIS — G56.21 LESION OF RIGHT ULNAR NERVE: ICD-10-CM

## 2024-04-11 RX ORDER — AMLODIPINE BESYLATE 5 MG/1
TABLET ORAL
Qty: 180 TABLET | Refills: 0 | Status: SHIPPED | OUTPATIENT
Start: 2024-04-11

## 2024-04-15 ENCOUNTER — TREATMENT (OUTPATIENT)
Dept: PHYSICAL THERAPY | Facility: CLINIC | Age: 69
End: 2024-04-15
Payer: MEDICARE

## 2024-04-15 DIAGNOSIS — G56.21 LESION OF RIGHT ULNAR NERVE: ICD-10-CM

## 2024-04-15 DIAGNOSIS — G56.01 CARPAL TUNNEL SYNDROME ON RIGHT: Primary | ICD-10-CM

## 2024-04-15 PROCEDURE — 97112 NEUROMUSCULAR REEDUCATION: CPT | Performed by: PHYSICAL THERAPIST

## 2024-04-15 PROCEDURE — G0283 ELEC STIM OTHER THAN WOUND: HCPCS | Performed by: PHYSICAL THERAPIST

## 2024-04-15 PROCEDURE — 97530 THERAPEUTIC ACTIVITIES: CPT | Performed by: PHYSICAL THERAPIST

## 2024-04-15 PROCEDURE — 97110 THERAPEUTIC EXERCISES: CPT | Performed by: PHYSICAL THERAPIST

## 2024-04-15 PROCEDURE — 97161 PT EVAL LOW COMPLEX 20 MIN: CPT | Performed by: PHYSICAL THERAPIST

## 2024-04-15 NOTE — PROGRESS NOTES
Physical Therapy Initial Evaluation and Plan of Care    Patient: John Jones   : 1955  Diagnosis/ICD-10 Code:  Carpal tunnel syndrome on right [G56.01]  Referring practitioner: Sahil Marks MD  Date of Initial Visit: 4/15/2024  Today's Date: 4/15/2024  Patient seen for 1 sessions         Visit Diagnoses:    ICD-10-CM ICD-9-CM   1. Carpal tunnel syndrome on right  G56.01 354.0   2. Lesion of right ulnar nerve  G56.21 354.2       Subjective Questionnaire: QuickDASH: 31.82     Subjective Evaluation    History of Present Illness  Date of surgery: 2024  Mechanism of injury: 68 year old male s/p R carpal tunnel and cubital tunnel releases on 24. No complications from surgery and pain is minimal at this time. Pt remains with c/o weakness in R hand and difficulty with opening jars/ bottles, gripping activities,  zipping a jacket, and fishing.     Pt with h/o ACDF with L sided residual weakness.      Patient Occupation: Retired. Helps to care for young grand children. Pain  No pain reported    Social Support  Lives in: multiple-level home  Lives with: spouse    Hand dominance: left    Treatments  Previous treatment: physical therapy  Patient Goals  Patient goals for therapy: increased strength, independence with ADLs/IADLs and increased motion             Objective          Postural Observations  Seated posture: poor  Standing posture: fair      Neurological Testing     Sensation   Cervical/Thoracic   Left   Intact: light touch    Right   Diminished: light touch    Strength/Myotome Testing     Left Shoulder     Planes of Motion   Flexion: 4-   Abduction: 4-   External rotation at 0°: 4-     Isolated Muscles   Lower trapezius: 3-   Middle trapezius: 3-     Right Shoulder     Planes of Motion   Flexion: 4-   Abduction: 4-   External rotation at 0°: 4-     Isolated Muscles   Lower trapezius: 3-   Middle trapezius: 3-     Left Elbow   Flexion: 4+  Extension: 4+    Right Elbow   Flexion: 4+  Extension:  4+    Left Wrist/Hand   Wrist extension: 4+  Wrist flexion: 4+  Radial deviation: 4+  Ulnar deviation: 4+     (2nd hand position)     Trial 1: 40 lbs    Right Wrist/Hand   Wrist extension: 4  Wrist flexion: 3+  Radial deviation: 3+  Ulnar deviation: 3+     (2nd hand position)     Trial 1: 22 lbs          Assessment & Plan       Assessment  Impairments: abnormal or restricted ROM, impaired physical strength, lacks appropriate home exercise program and pain with function   Functional limitations: carrying objects, lifting, sleeping, pulling, pushing, reaching behind back, reaching overhead and unable to perform repetitive tasks   Assessment details: 68 year old male s/p R carpal tunnel and cubital tunnel releases on 24. No complications from surgery and pain is minimal at this time. Pt remains with c/o weakness in R hand and difficulty with opening jars/ bottles, gripping activities,  zipping a jacket, and fishing. Pt presents to skilled PT with R UE weakness, postural weakness, R hand/  weakness, and postural abnormality. Pt would benefit from skilled PT to address the above findings and improve pt's ease with functional mobility and return to PLOF.      Barriers to therapy: none  Prognosis: good    Goals  Plan Goals: ST. Pt will improve R  strength L2 to 30 lb in 2 weeks for improved ease with opening a container.   2. Pt will demo improved postural awareness and correction in 2 weeks for decreased spinal stress.  3. Pt will demo compliance and good tolerance with HEP in 2 weeks.      LT.Pt will be independent with HEP in 6 weeks for self management and prevention of re-occurrence.   2.Pt will improve strength of R UE to grossly 4+/5 in 6 weeks for improved ease with overhead mobility and ADLs.  3.Pt will improve R  strength to 40 lb in 6 weeks for improved ease with UE dressing, gripping tasks, and opening containers  4.Pt will report reduction in impairment to <20 % per QuickDASH  in 6 weeks for improved ease and independence with ADLs and work tasks.  5. Pt will improve B scapular strength to grossly 4/5 in 6 weeks for improved postural stability and decreased stress to GH joint.     Plan  Therapy options: will be seen for skilled therapy services  Planned modality interventions: cryotherapy, electrical stimulation/Russian stimulation and thermotherapy (hydrocollator packs)  Planned therapy interventions: flexibility, functional ROM exercises, home exercise program, joint mobilization, manual therapy, neuromuscular re-education, postural training, soft tissue mobilization, strengthening, stretching, spinal/joint mobilization and therapeutic activities  Frequency: 2x week  Duration in weeks: 6  Treatment plan discussed with: patient      History # of Personal Factors and/or Comorbidities: MODERATE (1-2)  Examination of Body System(s): # of elements: LOW (1-2)  Clinical Presentation: STABLE   Clinical Decision Making: LOW       Timed:  Therapeutic Exercise:    15     mins  76037;     Neuromuscular Sekou:   10    mins  12996;    Therapeutic Activity:      13     mins  87069;      Untimed:  Low Eval                        15     Mins  93161  Electrical Stimulation:    10     mins  15066 ( );       Timed Treatment:   38   mins   Total Treatment:     63   mins    PT SIGNATURE: Melissa Gaytan PT, DPT  PT license: [place] [id number]        DATE TREATMENT INITIATED: 4/15/2024    Medicare Initial Certification  Certification Period: 7/13/2024  I certify that the therapy services are furnished while this patient is under my care.  The services outlined above are required by this patient, and will be reviewed every 90 days.    Physician Signature: __________________________________________________________    PHYSICIAN: Sahil Marks MD      DATE:     Please sign and return via fax to [phone].. Thank you, [facility].  [address] [place] [address]

## 2024-04-17 ENCOUNTER — TREATMENT (OUTPATIENT)
Dept: PHYSICAL THERAPY | Facility: CLINIC | Age: 69
End: 2024-04-17
Payer: MEDICARE

## 2024-04-17 DIAGNOSIS — G56.01 CARPAL TUNNEL SYNDROME ON RIGHT: Primary | ICD-10-CM

## 2024-04-17 DIAGNOSIS — G56.21 LESION OF RIGHT ULNAR NERVE: ICD-10-CM

## 2024-04-17 PROCEDURE — 97140 MANUAL THERAPY 1/> REGIONS: CPT | Performed by: PHYSICAL THERAPIST

## 2024-04-17 PROCEDURE — 97112 NEUROMUSCULAR REEDUCATION: CPT | Performed by: PHYSICAL THERAPIST

## 2024-04-17 PROCEDURE — 97110 THERAPEUTIC EXERCISES: CPT | Performed by: PHYSICAL THERAPIST

## 2024-04-17 NOTE — PROGRESS NOTES
Physical Therapy Daily Treatment Note  Visit: 2        Patient: John Jones   : 1955  Diagnosis/ICD-10 Code:  Carpal tunnel syndrome on right [G56.01]  Referring practitioner: Sahil Marks MD  Date of Initial Visit: Type: THERAPY  Noted: 4/15/2024  Today's Date: 2024  Patient seen for 2 sessions       Visit Diagnoses:    ICD-10-CM ICD-9-CM   1. Carpal tunnel syndrome on right  G56.01 354.0   2. Lesion of right ulnar nerve  G56.21 354.2       Subjective     John Jones reports: HEP is going well. Has been using home NMES unit.    Objective   See Exercise, Manual, and Modality Logs for complete treatment.       Assessment/Plan  Scapular and postural strengthening progressed today with excellent carry over. Demo compliance and good understanding of HEP.     Progress per Plan of Care.         Timed:  Manual Therapy:    8     mins  43477;  Therapeutic Exercise:    15     mins  45790;     Neuromuscular Sekou:    15    mins  95495;      Timed Treatment:   38   mins   Total Treatment:     38   mins        Melissa Gaytan PT, DPT  Physical Therapist  PT license: [place] [id number]

## 2024-04-19 RX ORDER — DOXAZOSIN MESYLATE 4 MG/1
4 TABLET ORAL
Qty: 90 TABLET | Refills: 0 | Status: SHIPPED | OUTPATIENT
Start: 2024-04-19

## 2024-04-22 ENCOUNTER — TREATMENT (OUTPATIENT)
Dept: PHYSICAL THERAPY | Facility: CLINIC | Age: 69
End: 2024-04-22
Payer: MEDICARE

## 2024-04-22 DIAGNOSIS — G56.21 LESION OF RIGHT ULNAR NERVE: ICD-10-CM

## 2024-04-22 DIAGNOSIS — G56.01 CARPAL TUNNEL SYNDROME ON RIGHT: Primary | ICD-10-CM

## 2024-04-22 PROCEDURE — 97112 NEUROMUSCULAR REEDUCATION: CPT | Performed by: PHYSICAL THERAPIST

## 2024-04-22 PROCEDURE — 97110 THERAPEUTIC EXERCISES: CPT | Performed by: PHYSICAL THERAPIST

## 2024-04-22 PROCEDURE — 97530 THERAPEUTIC ACTIVITIES: CPT | Performed by: PHYSICAL THERAPIST

## 2024-04-22 NOTE — PROGRESS NOTES
Physical Therapy Daily Treatment Note  Visit: 3        Patient: John Jones   : 1955  Diagnosis/ICD-10 Code:  Carpal tunnel syndrome on right [G56.01]  Referring practitioner: Sahil Marks MD  Date of Initial Visit: Type: THERAPY  Noted: 4/15/2024  Today's Date: 2024  Patient seen for 3 sessions       Visit Diagnoses:    ICD-10-CM ICD-9-CM   1. Carpal tunnel syndrome on right  G56.01 354.0   2. Lesion of right ulnar nerve  G56.21 354.2       Subjective     John Jones reports: mild stiffness in R hand. Cont to report weakness of fingers and difficulty gripping.     Objective   See Exercise, Manual, and Modality Logs for complete treatment.       Assessment/Plan  Cont to progress postural and gripping activities with excellent tolerance and carry over. Cont weakness and dexterity noted in R hand.     Progress per Plan of Care.         Timed:  Therapeutic Exercise:    15     mins  72410;     Neuromuscular Sekou:    15    mins  23007;    Therapeutic Activity:     8     mins  94232;         Timed Treatment:   38   mins   Total Treatment:     38   mins        Melissa Gaytan PT, DPT  Physical Therapist  PT license: [place] [id number]

## 2024-04-25 ENCOUNTER — TREATMENT (OUTPATIENT)
Dept: PHYSICAL THERAPY | Facility: CLINIC | Age: 69
End: 2024-04-25
Payer: MEDICARE

## 2024-04-25 DIAGNOSIS — G56.21 LESION OF RIGHT ULNAR NERVE: ICD-10-CM

## 2024-04-25 DIAGNOSIS — G56.01 CARPAL TUNNEL SYNDROME ON RIGHT: Primary | ICD-10-CM

## 2024-04-25 PROCEDURE — 97112 NEUROMUSCULAR REEDUCATION: CPT | Performed by: PHYSICAL THERAPIST

## 2024-04-25 PROCEDURE — 97110 THERAPEUTIC EXERCISES: CPT | Performed by: PHYSICAL THERAPIST

## 2024-04-25 PROCEDURE — 97530 THERAPEUTIC ACTIVITIES: CPT | Performed by: PHYSICAL THERAPIST

## 2024-04-25 NOTE — PROGRESS NOTES
Physical Therapy Daily Treatment Note  Visit: 4        Patient: John Jones   : 1955  Diagnosis/ICD-10 Code:  Carpal tunnel syndrome on right [G56.01]  Referring practitioner: Sahil Marks MD  Date of Initial Visit: Type: THERAPY  Noted: 4/15/2024  Today's Date: 2024  Patient seen for 4 sessions       Visit Diagnoses:    ICD-10-CM ICD-9-CM   1. Carpal tunnel syndrome on right  G56.01 354.0   2. Lesion of right ulnar nerve  G56.21 354.2       Subjective     John Jones reports: cont weakness in R hand.    Objective   See Exercise, Manual, and Modality Logs for complete treatment.       Assessment/Plan  Progressed scap stabilization exercises today with good tolerance. Fatigue reported at end of session. Muscle atrophy present in thenar eminence.     Progress per Plan of Care.         Timed:    Therapeutic Exercise:    15     mins  47350;     Neuromuscular Sekou:    15    mins  35384;    Therapeutic Activity:     8     mins  12549;         Timed Treatment:   38   mins   Total Treatment:     38   mins        Melissa Gaytan PT, DPT  Physical Therapist  PT license: [place] [id number]

## 2024-04-29 ENCOUNTER — TREATMENT (OUTPATIENT)
Dept: PHYSICAL THERAPY | Facility: CLINIC | Age: 69
End: 2024-04-29
Payer: MEDICARE

## 2024-04-29 DIAGNOSIS — G56.01 CARPAL TUNNEL SYNDROME ON RIGHT: Primary | ICD-10-CM

## 2024-04-29 DIAGNOSIS — G56.21 LESION OF RIGHT ULNAR NERVE: ICD-10-CM

## 2024-04-29 PROCEDURE — 97140 MANUAL THERAPY 1/> REGIONS: CPT | Performed by: PHYSICAL THERAPIST

## 2024-04-29 PROCEDURE — 97110 THERAPEUTIC EXERCISES: CPT | Performed by: PHYSICAL THERAPIST

## 2024-04-29 PROCEDURE — 97112 NEUROMUSCULAR REEDUCATION: CPT | Performed by: PHYSICAL THERAPIST

## 2024-04-29 NOTE — PROGRESS NOTES
Physical Therapy Daily Treatment Note  Visit: 5        Patient: John Jones   : 1955  Diagnosis/ICD-10 Code:  Carpal tunnel syndrome on right [G56.01]  Referring practitioner: Sahil Marks MD  Date of Initial Visit: Type: THERAPY  Noted: 4/15/2024  Today's Date: 2024  Patient seen for 5 sessions       Visit Diagnoses:    ICD-10-CM ICD-9-CM   1. Carpal tunnel syndrome on right  G56.01 354.0   2. Lesion of right ulnar nerve  G56.21 354.2       Subjective     John Jones reports: cont weakness in R hand.     Objective   See Exercise, Manual, and Modality Logs for complete treatment.       Assessment/Plan  Demo compliance with HEP. Good tolerance to scapular and UE strengthening progressions this date. Progress per pt tolerance.     Progress per Plan of Care.         Timed:  Manual Therapy:    8     mins  41633;  Therapeutic Exercise:    15     mins  61972;     Neuromuscular Sekou:    15    mins  72339;      Timed Treatment:   38   mins   Total Treatment:     38   mins        Melissa Gaytan PT, DPT  Physical Therapist  PT license: [place] [id number]

## 2024-05-02 ENCOUNTER — TREATMENT (OUTPATIENT)
Dept: PHYSICAL THERAPY | Facility: CLINIC | Age: 69
End: 2024-05-02
Payer: MEDICARE

## 2024-05-02 DIAGNOSIS — G56.21 LESION OF RIGHT ULNAR NERVE: ICD-10-CM

## 2024-05-02 DIAGNOSIS — G56.01 CARPAL TUNNEL SYNDROME ON RIGHT: Primary | ICD-10-CM

## 2024-05-02 PROCEDURE — 97140 MANUAL THERAPY 1/> REGIONS: CPT | Performed by: PHYSICAL THERAPIST

## 2024-05-02 PROCEDURE — 97112 NEUROMUSCULAR REEDUCATION: CPT | Performed by: PHYSICAL THERAPIST

## 2024-05-02 PROCEDURE — 97110 THERAPEUTIC EXERCISES: CPT | Performed by: PHYSICAL THERAPIST

## 2024-05-02 NOTE — PROGRESS NOTES
Physical Therapy Daily Treatment Note  Visit: 6        Patient: John Jones   : 1955  Diagnosis/ICD-10 Code:  Carpal tunnel syndrome on right [G56.01]  Referring practitioner: Sahil Marks MD  Date of Initial Visit: Type: THERAPY  Noted: 4/15/2024  Today's Date: 2024  Patient seen for 6 sessions       Visit Diagnoses:    ICD-10-CM ICD-9-CM   1. Carpal tunnel syndrome on right  G56.01 354.0   2. Lesion of right ulnar nerve  G56.21 354.2       Subjective     John Jones reports: having trouble opening a bag of chips due to weakness.    Objective   See Exercise, Manual, and Modality Logs for complete treatment.       Assessment/Plan  Working on postural retraining as well as various  strengthening activities. Good tolerance to all interventions and compliance with HEP.     Progress per Plan of Care.         Timed:  Manual Therapy:    8     mins  36129;  Therapeutic Exercise:    15     mins  74594;     Neuromuscular Sekou:    15    mins  70708;        Timed Treatment:   38   mins   Total Treatment:     38   mins        Melissa Gaytan PT, DPT  Physical Therapist  PT license: [place] [id number]

## 2024-05-06 ENCOUNTER — TREATMENT (OUTPATIENT)
Dept: PHYSICAL THERAPY | Facility: CLINIC | Age: 69
End: 2024-05-06
Payer: MEDICARE

## 2024-05-06 DIAGNOSIS — G56.01 CARPAL TUNNEL SYNDROME ON RIGHT: Primary | ICD-10-CM

## 2024-05-06 DIAGNOSIS — G56.21 LESION OF RIGHT ULNAR NERVE: ICD-10-CM

## 2024-05-06 PROCEDURE — 97112 NEUROMUSCULAR REEDUCATION: CPT | Performed by: PHYSICAL THERAPIST

## 2024-05-06 PROCEDURE — 97110 THERAPEUTIC EXERCISES: CPT | Performed by: PHYSICAL THERAPIST

## 2024-05-06 PROCEDURE — 97140 MANUAL THERAPY 1/> REGIONS: CPT | Performed by: PHYSICAL THERAPIST

## 2024-05-06 RX ORDER — PANTOPRAZOLE SODIUM 40 MG/1
TABLET, DELAYED RELEASE ORAL
Qty: 90 TABLET | Refills: 0 | Status: SHIPPED | OUTPATIENT
Start: 2024-05-06

## 2024-05-09 ENCOUNTER — TREATMENT (OUTPATIENT)
Dept: PHYSICAL THERAPY | Facility: CLINIC | Age: 69
End: 2024-05-09
Payer: MEDICARE

## 2024-05-09 DIAGNOSIS — G56.21 LESION OF RIGHT ULNAR NERVE: ICD-10-CM

## 2024-05-09 DIAGNOSIS — G56.01 CARPAL TUNNEL SYNDROME ON RIGHT: Primary | ICD-10-CM

## 2024-05-09 PROCEDURE — 97112 NEUROMUSCULAR REEDUCATION: CPT | Performed by: PHYSICAL THERAPIST

## 2024-05-09 PROCEDURE — 97110 THERAPEUTIC EXERCISES: CPT | Performed by: PHYSICAL THERAPIST

## 2024-05-09 PROCEDURE — 97140 MANUAL THERAPY 1/> REGIONS: CPT | Performed by: PHYSICAL THERAPIST

## 2024-05-13 ENCOUNTER — TREATMENT (OUTPATIENT)
Dept: PHYSICAL THERAPY | Facility: CLINIC | Age: 69
End: 2024-05-13
Payer: MEDICARE

## 2024-05-13 DIAGNOSIS — G56.01 CARPAL TUNNEL SYNDROME ON RIGHT: Primary | ICD-10-CM

## 2024-05-13 DIAGNOSIS — G56.21 LESION OF RIGHT ULNAR NERVE: ICD-10-CM

## 2024-05-13 PROCEDURE — 97110 THERAPEUTIC EXERCISES: CPT | Performed by: PHYSICAL THERAPIST

## 2024-05-13 PROCEDURE — 97140 MANUAL THERAPY 1/> REGIONS: CPT | Performed by: PHYSICAL THERAPIST

## 2024-05-13 PROCEDURE — 97112 NEUROMUSCULAR REEDUCATION: CPT | Performed by: PHYSICAL THERAPIST

## 2024-05-13 NOTE — PROGRESS NOTES
Physical Therapy Daily Treatment Note  Visit: 9        Patient: John Jones   : 1955  Diagnosis/ICD-10 Code:  Carpal tunnel syndrome on right [G56.01]  Referring practitioner: Sahil Marks MD  Date of Initial Visit: Type: THERAPY  Noted: 4/15/2024  Today's Date: 2024  Patient seen for 9 sessions       Visit Diagnoses:    ICD-10-CM ICD-9-CM   1. Carpal tunnel syndrome on right  G56.01 354.0   2. Lesion of right ulnar nerve  G56.21 354.2       Subjective     John Jones reports: improved ease with turning his key ignition. Still having trouble opening a chip bag or tearing.     Objective   See Exercise, Manual, and Modality Logs for complete treatment.       Assessment/Plan  Improved L  strength to 50# and R to 30#. Progressing well towards goals and demo compliance with HEP.    Progress per Plan of Care.         Timed:  Manual Therapy:    8     mins  49255;  Therapeutic Exercise:    15     mins  44742;     Neuromuscular Sekou:    15    mins  66392;        Timed Treatment:   38   mins   Total Treatment:     38   mins        Melissa Gaytan, PT, DPT  Physical Therapist  PT license: [place] [id number]

## 2024-05-16 ENCOUNTER — TREATMENT (OUTPATIENT)
Dept: PHYSICAL THERAPY | Facility: CLINIC | Age: 69
End: 2024-05-16
Payer: MEDICARE

## 2024-05-16 DIAGNOSIS — G56.21 LESION OF RIGHT ULNAR NERVE: ICD-10-CM

## 2024-05-16 DIAGNOSIS — G56.01 CARPAL TUNNEL SYNDROME ON RIGHT: Primary | ICD-10-CM

## 2024-05-16 PROCEDURE — 97110 THERAPEUTIC EXERCISES: CPT | Performed by: PHYSICAL THERAPIST

## 2024-05-16 PROCEDURE — 97112 NEUROMUSCULAR REEDUCATION: CPT | Performed by: PHYSICAL THERAPIST

## 2024-05-16 PROCEDURE — 97530 THERAPEUTIC ACTIVITIES: CPT | Performed by: PHYSICAL THERAPIST

## 2024-05-16 RX ORDER — LEVOTHYROXINE SODIUM 0.12 MG/1
125 TABLET ORAL EVERY MORNING
Qty: 90 TABLET | Refills: 0 | Status: SHIPPED | OUTPATIENT
Start: 2024-05-16

## 2024-05-16 NOTE — PROGRESS NOTES
Physical Therapy Daily Treatment Note  Visit: 10        Patient: John Jones   : 1955  Diagnosis/ICD-10 Code:  Carpal tunnel syndrome on right [G56.01]  Referring practitioner: Sahil Marks MD  Date of Initial Visit: Type: THERAPY  Noted: 4/15/2024  Today's Date: 2024  Patient seen for 10 sessions       Visit Diagnoses:    ICD-10-CM ICD-9-CM   1. Carpal tunnel syndrome on right  G56.01 354.0   2. Lesion of right ulnar nerve  G56.21 354.2       Subjective     John Jones reports: no new complaints.     Objective   See Exercise, Manual, and Modality Logs for complete treatment.       Assessment/Plan  Pt progressing towards independence of current HEP. Planning to start d/c planning over the next 2 weeks.     Progress per Plan of Care.         Timed:  Therapeutic Exercise:    15     mins  20367;     Neuromuscular Sekou:    15    mins  35006;    Therapeutic Activity:     8     mins  92134;         Timed Treatment:   38   mins   Total Treatment:     38   mins        Melissa Gaytan PT, DPT  Physical Therapist  PT license: [place] [id number]

## 2024-05-23 ENCOUNTER — TREATMENT (OUTPATIENT)
Dept: PHYSICAL THERAPY | Facility: CLINIC | Age: 69
End: 2024-05-23
Payer: MEDICARE

## 2024-05-23 DIAGNOSIS — G56.21 LESION OF RIGHT ULNAR NERVE: ICD-10-CM

## 2024-05-23 DIAGNOSIS — G56.01 CARPAL TUNNEL SYNDROME ON RIGHT: Primary | ICD-10-CM

## 2024-05-23 PROCEDURE — 97110 THERAPEUTIC EXERCISES: CPT | Performed by: PHYSICAL THERAPIST

## 2024-05-23 PROCEDURE — 97530 THERAPEUTIC ACTIVITIES: CPT | Performed by: PHYSICAL THERAPIST

## 2024-05-23 PROCEDURE — 97112 NEUROMUSCULAR REEDUCATION: CPT | Performed by: PHYSICAL THERAPIST

## 2024-05-23 NOTE — PROGRESS NOTES
Physical Therapy Daily Treatment Note  Visit: 11        Patient: John Jones   : 1955  Diagnosis/ICD-10 Code:  Carpal tunnel syndrome on right [G56.01]  Referring practitioner: Sahil Marks MD  Date of Initial Visit: Type: THERAPY  Noted: 4/15/2024  Today's Date: 2024  Patient seen for 11 sessions       Visit Diagnoses:    ICD-10-CM ICD-9-CM   1. Carpal tunnel syndrome on right  G56.01 354.0   2. Lesion of right ulnar nerve  G56.21 354.2       Subjective     John Jones reports: using level red on finger/ pinching exercise at home.    Objective   See Exercise, Manual, and Modality Logs for complete treatment.       Assessment/Plan  Progressing well towards goals. Planning review next week and will start d/c planning. No adverse reaction to tx session.     Progress per Plan of Care.         Timed:  Therapeutic Exercise:    15     mins  31322;     Neuromuscular Sekou:    15    mins  70526;    Therapeutic Activity:     8     mins  49334;         Timed Treatment:   38   mins   Total Treatment:     38   mins        Melissa Gaytan PT, DPT  Physical Therapist  PT license: [place] [id number]

## 2024-05-30 ENCOUNTER — TREATMENT (OUTPATIENT)
Dept: PHYSICAL THERAPY | Facility: CLINIC | Age: 69
End: 2024-05-30
Payer: MEDICARE

## 2024-05-30 DIAGNOSIS — G56.01 CARPAL TUNNEL SYNDROME ON RIGHT: Primary | ICD-10-CM

## 2024-05-30 DIAGNOSIS — G56.21 LESION OF RIGHT ULNAR NERVE: ICD-10-CM

## 2024-05-30 PROCEDURE — 97112 NEUROMUSCULAR REEDUCATION: CPT | Performed by: PHYSICAL THERAPIST

## 2024-05-30 PROCEDURE — 97530 THERAPEUTIC ACTIVITIES: CPT | Performed by: PHYSICAL THERAPIST

## 2024-05-30 PROCEDURE — 97110 THERAPEUTIC EXERCISES: CPT | Performed by: PHYSICAL THERAPIST

## 2024-05-30 NOTE — PROGRESS NOTES
Physical Therapy Daily Treatment Note  Visit: 12        Patient: John Jones   : 1955  Diagnosis/ICD-10 Code:  Carpal tunnel syndrome on right [G56.01]  Referring practitioner: Sahil Marks MD  Date of Initial Visit: Type: THERAPY  Noted: 4/15/2024  Today's Date: 2024  Patient seen for 12 sessions       Visit Diagnoses:    ICD-10-CM ICD-9-CM   1. Carpal tunnel syndrome on right  G56.01 354.0   2. Lesion of right ulnar nerve  G56.21 354.2       Subjective     John Jones reports: doing well. Has been able to increase resistance with HEP at home including pincher grippers.    Objective   See Exercise, Manual, and Modality Logs for complete treatment.       Assessment/Plan  Ready to trial HEP. Pt increased to 35lb on R  strength at L2. 50 lb on L. He demo independence with HEP. Pt to call if any problems arise in the next 60 days.    Progress per Plan of Care.         Timed:  Therapeutic Exercise:    15     mins  91713;     Neuromuscular Sekou:    15    mins  96424;    Therapeutic Activity:     8     mins  61866;          Timed Treatment:   38   mins   Total Treatment:     38   mins        Melissa Gaytan, PT, DPT  Physical Therapist  PT license: [place] [id number]

## 2024-07-09 RX ORDER — AMLODIPINE BESYLATE 5 MG/1
TABLET ORAL
Qty: 180 TABLET | Refills: 0 | Status: SHIPPED | OUTPATIENT
Start: 2024-07-09

## 2024-07-22 RX ORDER — DOXAZOSIN MESYLATE 4 MG/1
4 TABLET ORAL
Qty: 90 TABLET | Refills: 0 | Status: SHIPPED | OUTPATIENT
Start: 2024-07-22

## 2024-07-30 RX ORDER — PANTOPRAZOLE SODIUM 40 MG/1
TABLET, DELAYED RELEASE ORAL
Qty: 90 TABLET | Refills: 0 | Status: SHIPPED | OUTPATIENT
Start: 2024-07-30

## 2024-08-12 RX ORDER — LEVOTHYROXINE SODIUM 0.12 MG/1
125 TABLET ORAL EVERY MORNING
Qty: 90 TABLET | Refills: 0 | Status: SHIPPED | OUTPATIENT
Start: 2024-08-12

## 2024-10-01 NOTE — PROGRESS NOTES
Rooming Tab(CC,VS,Pt Hx,Fall Screen)  Chief Complaint   Patient presents with   • Hypertension       Subjective 64-year-old here for follow-up of his hypertension.  His blood pressure checks at home is 1 15-1 20 systolic and runs about 70 diastolic.  He denies any dizziness syncope or chest pain.  He does not really exercise but he does a lot of activity that is exertional.  He is tolerating his medication and feeling well, he does have a touch of whitecoat hypertension and when he comes in here his blood pressure is elevated.    I have reviewed and updated his medications, medical history and problem list during today's office visit.     Patient Care Team:  Diego Benavidez MD as PCP - General (Family Medicine)  Diego Benavidez MD as PCP - Family Medicine    Problem List Tab  Medications Tab  Synopsis Tab  Chart Review Tab  Care Everywhere Tab  Immunizations Tab  Patient History Tab    Social History     Tobacco Use   • Smoking status: Never Smoker   • Smokeless tobacco: Never Used   Substance Use Topics   • Alcohol use: Yes     Frequency: 2-4 times a month     Drinks per session: 3 or 4     Binge frequency: Less than monthly     Comment: SOCIALLY       Review of Systems   Constitutional: Negative for chills, fatigue and fever.   HENT: Negative for nosebleeds.    Eyes: Negative for blurred vision and double vision.   Respiratory: Negative for chest tightness and shortness of breath.    Cardiovascular: Negative for chest pain and palpitations.   Gastrointestinal: Negative for blood in stool.   Genitourinary: Negative for dysuria and hematuria.   Musculoskeletal: Negative for neck pain.   Neurological: Negative for dizziness and syncope.   Psychiatric/Behavioral: Negative for depressed mood.       Objective     Rooming Tab(CC,VS,Pt Hx,Fall Screen)  /70   Pulse 78   Resp 12   Wt 79.7 kg (175 lb 12.8 oz)   BMI 24.52 kg/m²     Body mass index is 24.52 kg/m².    Physical Exam   Constitutional: He is oriented  [FreeTextEntry1] : NSR 64 no acute st/t changes to person, place, and time. He appears well-developed and well-nourished. No distress.   HENT:   Head: Normocephalic and atraumatic.   Nose: Nose normal.   Mouth/Throat: Oropharynx is clear and moist.   Eyes: Pupils are equal, round, and reactive to light. Conjunctivae, EOM and lids are normal.   Neck: Trachea normal and normal range of motion. Neck supple. No JVD present. Carotid bruit is not present. No thyroid mass and no thyromegaly present.   No carotid bruits   Cardiovascular: Normal rate, regular rhythm, normal heart sounds and intact distal pulses.   Pulmonary/Chest: Effort normal and breath sounds normal.   Musculoskeletal:   No c/c/e   Neurological: He is alert and oriented to person, place, and time. No cranial nerve deficit.   Skin: Skin is warm and dry.   Psychiatric: He has a normal mood and affect. His speech is normal and behavior is normal. He is attentive.   Nursing note and vitals reviewed.       Statin Choice Calculator  Data Reviewed:                   Assessment/Plan   Order Review Tab  Health Maintenance Tab  Patient Plan/Order Tab  Diagnoses and all orders for this visit:    1. Essential hypertension (Primary)  Assessment & Plan:  Hypertension is improving with treatment.  Continue current treatment regimen.  Dietary sodium restriction.  Regular aerobic exercise.  Continue current medications.  Blood pressure will be reassessed at the next regular appointment.      2. White coat syndrome with hypertension  Assessment & Plan:  The patient's blood pressure is normal at home and here it is high, we will be using his home readings so he will need to check this on occasion and before his next appointment.        Wrapup Tab  Return in about 39 weeks (around 2/5/2021) for Medicare Wellness.

## 2024-10-07 RX ORDER — AMLODIPINE BESYLATE 5 MG/1
TABLET ORAL
Qty: 180 TABLET | Refills: 0 | Status: SHIPPED | OUTPATIENT
Start: 2024-10-07

## 2024-10-17 RX ORDER — DOXAZOSIN 4 MG/1
4 TABLET ORAL
Qty: 90 TABLET | Refills: 0 | Status: SHIPPED | OUTPATIENT
Start: 2024-10-17

## 2024-10-24 RX ORDER — PANTOPRAZOLE SODIUM 40 MG/1
TABLET, DELAYED RELEASE ORAL
Qty: 90 TABLET | Refills: 0 | Status: SHIPPED | OUTPATIENT
Start: 2024-10-24

## 2024-11-07 RX ORDER — LEVOTHYROXINE SODIUM 125 UG/1
125 TABLET ORAL EVERY MORNING
Qty: 90 TABLET | Refills: 0 | Status: SHIPPED | OUTPATIENT
Start: 2024-11-07

## 2024-12-04 DIAGNOSIS — M25.511 CHRONIC RIGHT SHOULDER PAIN: Primary | ICD-10-CM

## 2024-12-04 DIAGNOSIS — G89.29 CHRONIC RIGHT SHOULDER PAIN: Primary | ICD-10-CM

## 2024-12-05 ENCOUNTER — HOSPITAL ENCOUNTER (OUTPATIENT)
Dept: GENERAL RADIOLOGY | Facility: HOSPITAL | Age: 69
Discharge: HOME | End: 2024-12-05
Admitting: NURSE PRACTITIONER
Payer: MEDICARE

## 2024-12-05 ENCOUNTER — OFFICE VISIT (OUTPATIENT)
Age: 69
End: 2024-12-05
Payer: MEDICARE

## 2024-12-05 VITALS — HEIGHT: 69 IN | OXYGEN SATURATION: 98 % | WEIGHT: 188 LBS | RESPIRATION RATE: 20 BRPM | BODY MASS INDEX: 27.85 KG/M2

## 2024-12-05 DIAGNOSIS — M54.12 CERVICAL RADICULOPATHY: ICD-10-CM

## 2024-12-05 DIAGNOSIS — M67.813 TENDINOSIS OF RIGHT SHOULDER: Primary | ICD-10-CM

## 2024-12-05 PROCEDURE — 73030 X-RAY EXAM OF SHOULDER: CPT

## 2024-12-05 RX ORDER — TRIAMCINOLONE ACETONIDE 40 MG/ML
80 INJECTION, SUSPENSION INTRA-ARTICULAR; INTRAMUSCULAR
Status: COMPLETED | OUTPATIENT
Start: 2024-12-05 | End: 2024-12-05

## 2024-12-05 RX ORDER — AMOXICILLIN 500 MG/1
CAPSULE ORAL
COMMUNITY
Start: 2024-08-21

## 2024-12-05 RX ORDER — LIDOCAINE HYDROCHLORIDE 10 MG/ML
2 INJECTION, SOLUTION INFILTRATION; PERINEURAL
Status: COMPLETED | OUTPATIENT
Start: 2024-12-05 | End: 2024-12-05

## 2024-12-05 RX ADMIN — LIDOCAINE HYDROCHLORIDE 2 ML: 10 INJECTION, SOLUTION INFILTRATION; PERINEURAL at 10:05

## 2024-12-05 RX ADMIN — TRIAMCINOLONE ACETONIDE 80 MG: 40 INJECTION, SUSPENSION INTRA-ARTICULAR; INTRAMUSCULAR at 10:05

## 2024-12-05 NOTE — PROGRESS NOTES
"[facility]        Patient Name: John Jones  : 1955  Primary Care Physician: José Bravo MD        Chief Complaint:    Chief Complaint   Patient presents with    Right Shoulder - Pain, Initial Evaluation      Ongoing pain for about 1 month and now feels a \"catch\" in it         HPI:   John Jones is a 69 y.o. year old who presents today for evaluation of right shoulder pain.    Onset of symptoms was ~ 6 weeks ago after raking leaves all day. Pain location is the anterior shoulder. He feels the shoulder has a \"catch\" in it and is \"popping\" a lot. He has some pain with ROM. Denies any weakness. No numbness/tingling. He denies injury. Previous right CTS, but no shoulder surgeries. He takes Advil Qhs which provides minimal relief.   He is left hand dominant.         Past Medical/Surgical, Social and Family History:  I have reviewed and/or updated pertinent history as noted in the medical record including:  Past Medical History:   Diagnosis Date    Ankle sprain     Arthritis     Biceps tendon rupture     right    BPH (benign prostatic hyperplasia)     Cervical disc disorder 1 year ago    Cervical spinal stenosis     Colon polyp ?    1 polyp about 15 years ago    CTS (carpal tunnel syndrome) 10 years ago    Early cataract     History of hepatitis A     30 YEARS AGO    Hypertension     Hypothyroidism     Infectious viral hepatitis 30 plus years ago    Hepatitis A    Knee osteoarthritis     Knee pain, bilateral     Spinal stenosis     NUMBNESS/TINGLING LEFT ARM    Tear of meniscus of knee      Past Surgical History:   Procedure Laterality Date    ADENOIDECTOMY      ANTERIOR CERVICAL DISCECTOMY W/ FUSION N/A 2019    Procedure: C5-C7 anterior cervical discectomy, fusion and instrumentation;  Surgeon: Roderick Phoenix MD;  Location: [facility] OR;  Service: Neurosurgery    CARPAL TUNNEL RELEASE Right     CATARACT EXTRACTION, BILATERAL      COLONOSCOPY      2017-due 5 years     EYE SURGERY  2019    " Cataracts    JOINT REPLACEMENT  2019    Lt & Rt knees    KNEE ARTHROSCOPY Bilateral 1990    NO COMPLICATIONS    NECK SURGERY  May 6, 2019    REPLACEMENT TOTAL KNEE BILATERAL      SPINE SURGERY  2019    Ant Cerv fusion 2 level    TONSILLECTOMY  1960    NO COMPLICATIONS    WRIST SURGERY      Carpal tunnel surgery RHand     Social History     Occupational History    Occupation: RETIRED   Tobacco Use    Smoking status: Never    Smokeless tobacco: Never   Vaping Use    Vaping status: Never Used    Passive vaping exposure: Yes   Substance and Sexual Activity    Alcohol use: Yes     Alcohol/week: 1.0 standard drink of alcohol     Types: 1 Cans of beer per week     Comment: Very rarely    Drug use: No    Sexual activity: Yes     Partners: Female          Allergies: No Known Allergies    Medications:   Home Medications:  Current Outpatient Medications on File Prior to Visit   Medication Sig    amLODIPine (NORVASC) 5 MG tablet Take 1 tablet by mouth twice daily    amoxicillin (AMOXIL) 500 MG capsule TAKE 4 CAPSULES BY MOUTH 1 HOUR PRIOR TO DENTAL APPOINTMENT    doxazosin (CARDURA) 4 MG tablet TAKE 1 TABLET BY MOUTH ONCE DAILY AT BEDTIME    levothyroxine (SYNTHROID, LEVOTHROID) 125 MCG tablet TAKE 1 TABLET BY MOUTH ONCE DAILY IN THE MORNING    pantoprazole (PROTONIX) 40 MG EC tablet Take 1 tablet by mouth once daily    [DISCONTINUED] Cyanocobalamin (VITAMIN B12 PO) Take  by mouth Daily.     No current facility-administered medications on file prior to visit.         ROS:  Negative unless listed in the HPI    Physical Exam:   69 y.o. male  Body mass index is 27.76 kg/m²., 85.3 kg (188 lb)  Vitals:    12/05/24 0913   Resp: 20   SpO2: 98%     General: Alert, cooperative, appears well and in no observable distress.   HEENT: Normocephalic, atraumatic on external visual inspection. No icterus.   CV: No significant peripheral edema.    Respiratory: Normal respiratory effort.   Skin: Warm & well perfused; appropriate skin  turgor.  Psych: Appropriate mood & affect.  Neuro: Gross sensation and motor intact in affected extremity/extremities.  Vascular: Peripheral pulses palpable in affected extremity/extremities.     Right Shoulder Exam     Range of Motion   Active abduction:  180   Internal rotation 90 degrees:  90     Muscle Strength   Abduction: 4/5   Internal rotation: 5/5   External rotation: 4+/5   Biceps: 5/5     Tests   Puga test: positive  Drop arm: negative           Shoulder Musculoskeletal Exam    Inspection    Right      Ecchymosis: none      Peripheral edema: none      Atrophy: none      Deformity: none    Palpation    Right      Crepitus: no crepitus      Increased warmth: none      Tenderness: none    Range of Motion    Right      Active ROM: normal and pain.       Passive ROM: normal.       Active forward elevation: 180.       Shoulder active abduction: 180.       Active external rotation at side: 90.       Active internal rotation in abduction: 90.     Strength    Right      External rotation: 4+/5. External rotation is affected by pain.       Internal rotation: 5/5.       Abduction: 4/5. Abduction is affected by pain.       Biceps: 5/5.       Triceps: 5/5.     Special Tests    Right    Rotator Cuff Signs      Puga test: positive       Belly press test: negative       Painful arc test: positive       Lift-off sign: negative       Drop arm test: negative     Biceps/pedro luis Signs      [place]'s test: negative       Clicking/popping: positive       Justyn deformity: negative       Speed's test: negative     AC Joint Signs      Active horizontal adduction pain: negative       Radiology:  X-Ray Report:  Right shoulder(s) X-Ray  Indication: Evaluation of pain  AP, Lateral views  Findings: mild degenerative changes. No acute findings noted   Bony lesion: no  Soft tissues: within normal limits  Joint spaces: within normal limits  Hardware appropriately positioned: not applicable  Prior studies available for comparison: no      Large Joint Arthrocentesis: R subacromial bursa  Date/Time: 12/5/2024 10:05 AM  Consent given by: patient  Site marked: site marked  Timeout: Immediately prior to procedure a time out was called to verify the correct patient, procedure, equipment, support staff and site/side marked as required   Supporting Documentation  Indications: pain   Procedure Details  Location: shoulder - R subacromial bursa  Needle size: 25 G  Approach: posterior  Medications administered: 2 mL lidocaine 1 %; 80 mg triamcinolone acetonide 40 MG/ML  Patient tolerance: patient tolerated the procedure well with no immediate complications          Assessment:  Right shoulder tendinosis   Body mass index is 27.76 kg/m².  BMI consistent with Overweight: 25.0-29.9kg/m2            Plan:  I have reviewed the above with the patient in detail today  He has preserved ROM and overall preserved strength  No acute findings on imaging; no findings on clinical exam to warrant advanced imaging  Discussed steroid injection today, risk/benefits. He is willing to proceed. Please see above.  Follow up if symptoms persist or worsen   Patient encouraged to call with any questions or concerns in the interim    NALDO Mirza

## 2024-12-30 RX ORDER — AMLODIPINE BESYLATE 5 MG/1
TABLET ORAL
Qty: 180 TABLET | Refills: 0 | Status: SHIPPED | OUTPATIENT
Start: 2024-12-30

## 2025-01-07 ENCOUNTER — TELEPHONE (OUTPATIENT)
Dept: FAMILY MEDICINE CLINIC | Facility: CLINIC | Age: 70
End: 2025-01-07
Payer: MEDICARE

## 2025-01-07 PROBLEM — M54.2 NECK PAIN: Status: RESOLVED | Noted: 2019-03-04 | Resolved: 2025-01-07

## 2025-01-07 PROBLEM — M75.80 ROTATOR CUFF TENDONITIS: Status: RESOLVED | Noted: 2017-02-27 | Resolved: 2025-01-07

## 2025-01-07 PROBLEM — M62.81 MUSCLE LEFT ARM WEAKNESS: Status: RESOLVED | Noted: 2019-04-10 | Resolved: 2025-01-07

## 2025-01-07 PROBLEM — M25.519 SHOULDER PAIN: Status: RESOLVED | Noted: 2019-03-04 | Resolved: 2025-01-07

## 2025-01-07 PROBLEM — R09.89 THROAT CLEARING: Status: RESOLVED | Noted: 2021-02-14 | Resolved: 2025-01-07

## 2025-01-07 PROBLEM — M75.21 BICEPS TENDINITIS, RIGHT: Status: RESOLVED | Noted: 2018-08-17 | Resolved: 2025-01-07

## 2025-01-07 PROBLEM — I10 WHITE COAT SYNDROME WITH HYPERTENSION: Status: RESOLVED | Noted: 2020-05-08 | Resolved: 2025-01-07

## 2025-01-07 PROBLEM — M75.51 SUBACROMIAL BURSITIS OF RIGHT SHOULDER JOINT: Status: RESOLVED | Noted: 2018-08-08 | Resolved: 2025-01-07

## 2025-01-07 NOTE — TELEPHONE ENCOUNTER
Caller: DARWIN REEDER    Relationship: Emergency Contact    Best call back number: [phone]     What was the call regarding: PATIENT'S WIFE IS REQUESTING THAT PATIENT'S LIST OF MEDICATIONS BE SENT TO BOTH [facility] AND [facility]. PATIENT'S WIFE WAS GIVEN THE FOLLOWING INFORMATION BY [facility]: [phone], PRESS 2 IF GIVING VERBAL, PRESS 3 FOR FAX, PRESS 6 IF ELECTRONICALLY SENDING IT. PLEASE ADVISE.    PHARMACIES:  [facility] - [address] [phone] [facility] [phone]      [id number] - [facility] [place], [place] - [address] [facility] - [phone]  - [phone] FX

## 2025-01-07 NOTE — TELEPHONE ENCOUNTER
Patient needs to change pcp and schedule appointment.  I can not send refills under Dr. Bravo to mailorder .

## 2025-01-07 NOTE — TELEPHONE ENCOUNTER
Gave message to patient's wife Petrona at 2:50pm. Scheduled an appt with Dr Johnson on 01/08/2025 at 10am. Wife was not happy about Dr Bravo leaving our practice and said patient never got a letter.

## 2025-01-08 ENCOUNTER — OFFICE VISIT (OUTPATIENT)
Dept: FAMILY MEDICINE CLINIC | Facility: CLINIC | Age: 70
End: 2025-01-08
Payer: MEDICARE

## 2025-01-08 VITALS
RESPIRATION RATE: 18 BRPM | SYSTOLIC BLOOD PRESSURE: 124 MMHG | WEIGHT: 187.5 LBS | DIASTOLIC BLOOD PRESSURE: 72 MMHG | OXYGEN SATURATION: 98 % | HEART RATE: 88 BPM | HEIGHT: 69 IN | BODY MASS INDEX: 27.77 KG/M2

## 2025-01-08 DIAGNOSIS — I10 ESSENTIAL HYPERTENSION: Primary | ICD-10-CM

## 2025-01-08 DIAGNOSIS — E03.9 ACQUIRED HYPOTHYROIDISM: ICD-10-CM

## 2025-01-08 DIAGNOSIS — Z13.220 SCREENING FOR HYPERLIPIDEMIA: ICD-10-CM

## 2025-01-08 DIAGNOSIS — K21.9 LARYNGOPHARYNGEAL REFLUX (LPR): ICD-10-CM

## 2025-01-08 DIAGNOSIS — Z13.1 SCREENING FOR DIABETES MELLITUS: ICD-10-CM

## 2025-01-08 DIAGNOSIS — N40.1 BENIGN PROSTATIC HYPERPLASIA WITH LOWER URINARY TRACT SYMPTOMS, SYMPTOM DETAILS UNSPECIFIED: ICD-10-CM

## 2025-01-08 PROBLEM — L75.1 CHROMHIDROSIS: Status: RESOLVED | Noted: 2020-07-12 | Resolved: 2025-01-08

## 2025-01-08 PROBLEM — M54.12 CERVICAL RADICULOPATHY: Status: RESOLVED | Noted: 2019-04-10 | Resolved: 2025-01-08

## 2025-01-08 PROCEDURE — 1126F AMNT PAIN NOTED NONE PRSNT: CPT | Performed by: STUDENT IN AN ORGANIZED HEALTH CARE EDUCATION/TRAINING PROGRAM

## 2025-01-08 PROCEDURE — 3074F SYST BP LT 130 MM HG: CPT | Performed by: STUDENT IN AN ORGANIZED HEALTH CARE EDUCATION/TRAINING PROGRAM

## 2025-01-08 PROCEDURE — G2211 COMPLEX E/M VISIT ADD ON: HCPCS | Performed by: STUDENT IN AN ORGANIZED HEALTH CARE EDUCATION/TRAINING PROGRAM

## 2025-01-08 PROCEDURE — 3078F DIAST BP <80 MM HG: CPT | Performed by: STUDENT IN AN ORGANIZED HEALTH CARE EDUCATION/TRAINING PROGRAM

## 2025-01-08 PROCEDURE — 99214 OFFICE O/P EST MOD 30 MIN: CPT | Performed by: STUDENT IN AN ORGANIZED HEALTH CARE EDUCATION/TRAINING PROGRAM

## 2025-01-08 RX ORDER — DOXAZOSIN 4 MG/1
4 TABLET ORAL
Qty: 90 TABLET | Refills: 3 | Status: SHIPPED | OUTPATIENT
Start: 2025-01-08 | End: 2025-01-09 | Stop reason: SDUPTHER

## 2025-01-08 RX ORDER — AMLODIPINE BESYLATE 5 MG/1
5 TABLET ORAL 2 TIMES DAILY
Qty: 180 TABLET | Refills: 3 | Status: SHIPPED | OUTPATIENT
Start: 2025-01-08 | End: 2025-01-09 | Stop reason: SDUPTHER

## 2025-01-08 RX ORDER — LEVOTHYROXINE SODIUM 125 UG/1
125 TABLET ORAL EVERY MORNING
Qty: 90 TABLET | Refills: 3 | Status: SHIPPED | OUTPATIENT
Start: 2025-01-08 | End: 2025-01-09 | Stop reason: SDUPTHER

## 2025-01-08 RX ORDER — PANTOPRAZOLE SODIUM 40 MG/1
40 TABLET, DELAYED RELEASE ORAL DAILY
Qty: 90 TABLET | Refills: 3 | Status: SHIPPED | OUTPATIENT
Start: 2025-01-08 | End: 2025-01-09 | Stop reason: SDUPTHER

## 2025-01-08 NOTE — PROGRESS NOTES
"Chief Complaint  Chief Complaint   Patient presents with    Establish Care       Subjective        John Jones is a 69 y.o. male who presents to [facility].  History of Present Illness    John is a 69-year-old male here for medication management.      Hypertension  Well-controlled-states he checks blood pressure at home and readings are consistent with office reading  Takes amlodipine 5 mg twice daily      BPH  Doxazosin 4 mg at bedtime  States overall symptoms are controlled but not always    Hypothyroidism  Has had for several years  Has been on the current dose of his levothyroxine (125 mcg) for the last few years    Laryngeal pharyngeal reflux  Several years ago had chronic cough, resolved after starting pantoprazole        Objective   /72   Pulse 88   Resp 18   Ht 175.3 cm (69\")   Wt 85 kg (187 lb 8 oz)   SpO2 98%   BMI 27.69 kg/m²     Estimated body mass index is 27.69 kg/m² as calculated from the following:    Height as of this encounter: 175.3 cm (69\").    Weight as of this encounter: 85 kg (187 lb 8 oz).     Physical Exam   GEN: In no acute distress, non toxic appearing  HEENT: EOMI. Mucous membranes moist. Oropharynx without erythema or exudate.   CV: Regular rate and rhythm, no murmurs. No extremity edema.   RESP: Lungs clear to auscultation bilaterally.  No signs of respiratory distress on room air.  SKIN: No rashes  MSK: No joint erythema, deformity, or effusion.   NEURO: Alert and appropriate. CN 2-12 intact grossly.       PHQ-2 Depression Screening  Little interest or pleasure in doing things? Not at all   Feeling down, depressed, or hopeless? Not at all   PHQ-2 Total Score 0         Result Review :              Assessment and Plan     Diagnoses and all orders for this visit:    1. Essential hypertension (Primary)  Well-controlled with amlodipine 5 mg twice daily  Gave option to switch amlodipine to 10 mg once daily-patient declines at this time, happy with " level of control with current regimen.    2. Acquired hypothyroidism  Well-controlled with levothyroxine 125 mcg once daily  Will update TSH prior to Medicare wellness visit in a few months    -     TSH; Future    3. Laryngopharyngeal reflux (LPR)  Well-controlled with pantoprazole 40 mg once daily    4. Benign prostatic hyperplasia with lower urinary tract symptoms  Overall well-controlled with doxazosin 4 mg at bedtime    5. Screening for diabetes mellitus  -     Basic Metabolic Panel; Future  -     Hemoglobin A1c; Future    6. Screening for hyperlipidemia  -     Lipid Panel; Future    Other orders  -     amLODIPine (NORVASC) 5 MG tablet; Take 1 tablet by mouth 2 (Two) Times a Day. Take 1 tablet by mouth twice daily  Dispense: 180 tablet; Refill: 3  -     doxazosin (CARDURA) 4 MG tablet; Take 1 tablet by mouth every night at bedtime.  Dispense: 90 tablet; Refill: 3  -     levothyroxine (SYNTHROID, LEVOTHROID) 125 MCG tablet; Take 1 tablet by mouth Every Morning.  Dispense: 90 tablet; Refill: 3  -     pantoprazole (PROTONIX) 40 MG EC tablet; Take 1 tablet by mouth Daily.  Dispense: 90 tablet; Refill: 3            Follow Up     Return in about 3 months (around 4/8/2025) for Medicare Wellness.

## 2025-01-09 RX ORDER — PANTOPRAZOLE SODIUM 40 MG/1
40 TABLET, DELAYED RELEASE ORAL DAILY
Qty: 90 TABLET | Refills: 3 | Status: SHIPPED | OUTPATIENT
Start: 2025-01-09

## 2025-01-09 RX ORDER — AMLODIPINE BESYLATE 5 MG/1
5 TABLET ORAL 2 TIMES DAILY
Qty: 180 TABLET | Refills: 3 | Status: SHIPPED | OUTPATIENT
Start: 2025-01-09

## 2025-01-09 RX ORDER — DOXAZOSIN 4 MG/1
4 TABLET ORAL
Qty: 90 TABLET | Refills: 3 | Status: SHIPPED | OUTPATIENT
Start: 2025-01-09

## 2025-01-09 RX ORDER — LEVOTHYROXINE SODIUM 125 UG/1
125 TABLET ORAL EVERY MORNING
Qty: 90 TABLET | Refills: 3 | Status: SHIPPED | OUTPATIENT
Start: 2025-01-09

## 2025-01-09 NOTE — TELEPHONE ENCOUNTER
Caller: DARWIN REEDER    Relationship: Emergency Contact    Best call back number: [phone]    Requested Prescriptions:   Requested Prescriptions     Pending Prescriptions Disp Refills    pantoprazole (PROTONIX) 40 MG EC tablet 90 tablet 3     Sig: Take 1 tablet by mouth Daily.    levothyroxine (SYNTHROID, LEVOTHROID) 125 MCG tablet 90 tablet 3     Sig: Take 1 tablet by mouth Every Morning.    doxazosin (CARDURA) 4 MG tablet 90 tablet 3     Sig: Take 1 tablet by mouth every night at bedtime.    amLODIPine (NORVASC) 5 MG tablet 180 tablet 3     Sig: Take 1 tablet by mouth 2 (Two) Times a Day. Take 1 tablet by mouth twice daily        Pharmacy where request should be sent: [id number] - [place], [place] - [address] AT [facility] [address] - [phone] PH - [phone] FX     Last office visit with prescribing clinician: 1/8/2025   Last telemedicine visit with prescribing clinician: Visit date not found   Next office visit with prescribing clinician: 4/14/2025     Additional details provided by patient: PATIENT WIFE CALLING STATES THESE MEDICATION NEED TO BE 90 DAY REFILL FOR LOCAL [facility] DUE TO PATIENT IS ALMOST OUT OF MEDICATION         Would you like a call back once the refill request has been completed: [] Yes [x] No    If the office needs to give you a call back, can they leave a voicemail: [] Yes [x] No    Caprice Freeman Rep   01/09/25 09:50 EST

## 2025-01-20 RX ORDER — PANTOPRAZOLE SODIUM 40 MG/1
40 TABLET, DELAYED RELEASE ORAL DAILY
Qty: 90 TABLET | Refills: 1 | Status: SHIPPED | OUTPATIENT
Start: 2025-01-20

## 2025-02-03 RX ORDER — LEVOTHYROXINE SODIUM 125 UG/1
125 TABLET ORAL EVERY MORNING
Qty: 90 TABLET | Refills: 0 | Status: SHIPPED | OUTPATIENT
Start: 2025-02-03

## 2025-03-06 ENCOUNTER — OFFICE VISIT (OUTPATIENT)
Age: 70
End: 2025-03-06
Payer: MEDICARE

## 2025-03-06 VITALS — RESPIRATION RATE: 20 BRPM | WEIGHT: 187 LBS | HEIGHT: 69 IN | BODY MASS INDEX: 27.7 KG/M2 | OXYGEN SATURATION: 98 %

## 2025-03-06 DIAGNOSIS — M67.813 TENDINOSIS OF RIGHT SHOULDER: Primary | ICD-10-CM

## 2025-03-06 DIAGNOSIS — G89.29 CHRONIC RIGHT SHOULDER PAIN: ICD-10-CM

## 2025-03-06 DIAGNOSIS — M25.511 CHRONIC RIGHT SHOULDER PAIN: ICD-10-CM

## 2025-03-06 RX ORDER — LIDOCAINE HYDROCHLORIDE 10 MG/ML
2 INJECTION, SOLUTION EPIDURAL; INFILTRATION; INTRACAUDAL; PERINEURAL
Status: COMPLETED | OUTPATIENT
Start: 2025-03-06 | End: 2025-03-06

## 2025-03-06 RX ORDER — TRIAMCINOLONE ACETONIDE 40 MG/ML
80 INJECTION, SUSPENSION INTRA-ARTICULAR; INTRAMUSCULAR
Status: COMPLETED | OUTPATIENT
Start: 2025-03-06 | End: 2025-03-06

## 2025-03-06 RX ADMIN — LIDOCAINE HYDROCHLORIDE 2 ML: 10 INJECTION, SOLUTION EPIDURAL; INFILTRATION; INTRACAUDAL; PERINEURAL at 08:55

## 2025-03-06 RX ADMIN — TRIAMCINOLONE ACETONIDE 80 MG: 40 INJECTION, SUSPENSION INTRA-ARTICULAR; INTRAMUSCULAR at 08:55

## 2025-03-06 NOTE — PROGRESS NOTES
INJECTION VISIT    Patient: John Jones    YOB: 1955    MRN: [id number]    Chief Complaint   Patient presents with    Right Shoulder - Follow-up       History of Present Illness:   John Jones is a 69 y.o. year old who presents today for injection of the right shoulder. His last injection was 3 months ago and provided good relief of symptoms. Symptoms returned after shoveling snow.         Allergies: No Known Allergies    Medications:   Home Medications:  Current Outpatient Medications on File Prior to Visit   Medication Sig    amLODIPine (NORVASC) 5 MG tablet Take 1 tablet by mouth 2 (Two) Times a Day. Take 1 tablet by mouth twice daily    doxazosin (CARDURA) 4 MG tablet Take 1 tablet by mouth every night at bedtime.    levothyroxine (SYNTHROID, LEVOTHROID) 125 MCG tablet TAKE 1 TABLET BY MOUTH ONCE DAILY IN THE MORNING    pantoprazole (PROTONIX) 40 MG EC tablet Take 1 tablet by mouth once daily     No current facility-administered medications on file prior to visit.         I have reviewed the patient's medical history in detail and updated the computerized patient record.  Review and summarization of old records include:    Past Medical History:   Diagnosis Date    Ankle sprain     Arthritis     Biceps tendon rupture     right    BPH (benign prostatic hyperplasia)     Cervical disc disorder 1 year ago    Cervical spinal stenosis     Colon polyp ?    1 polyp about 15 years ago    CTS (carpal tunnel syndrome) 10 years ago    Early cataract     Fracture of ankle     Childhood injury    History of hepatitis A     30 YEARS AGO    Hypertension     Hypothyroidism     Infectious viral hepatitis 30 plus years ago    Hepatitis A    Knee osteoarthritis     Knee pain, bilateral     Low back strain     40 years ago    Spinal stenosis     NUMBNESS/TINGLING LEFT ARM    Tear of meniscus of knee     Tennis elbow      Past Surgical History:   Procedure Laterality Date    ADENOIDECTOMY  1960    ANTERIOR CERVICAL  DISCECTOMY W/ FUSION N/A 05/06/2019    Procedure: C5-C7 anterior cervical discectomy, fusion and instrumentation;  Surgeon: Roderick Phoenix MD;  Location: [facility] OR;  Service: Neurosurgery    CARPAL TUNNEL RELEASE Right 2024    CATARACT EXTRACTION, BILATERAL      COLONOSCOPY      2017-due 5 years     EYE SURGERY  2019    Cataracts    HAND SURGERY      Corpal tunnel    JOINT REPLACEMENT  2019    Lt & Rt knees    KNEE ARTHROSCOPY Bilateral 1990    NO COMPLICATIONS    NECK SURGERY  May 6, 2019    REPLACEMENT TOTAL KNEE BILATERAL      SPINE SURGERY  2019    Ant Cerv fusion 2 level    TONSILLECTOMY  1960    NO COMPLICATIONS    WRIST SURGERY      Carpal tunnel surgery RHand     Social History     Occupational History    Occupation: RETIRED   Tobacco Use    Smoking status: Never    Smokeless tobacco: Never   Vaping Use    Vaping status: Never Used    Passive vaping exposure: Yes   Substance and Sexual Activity    Alcohol use: Yes     Alcohol/week: 1.0 standard drink of alcohol     Types: 1 Cans of beer per week     Comment: Very rarely    Drug use: No    Sexual activity: Yes     Partners: Female     Birth control/protection: None      Social History     Social History Narrative    LIVES WITH SPOUSE     Family History   Problem Relation Age of Onset    Pancreatic cancer Mother     Cancer Mother     Heart disease Father         bladder cancer    Arthritis Father     Cancer Father     Hyperlipidemia Father     Thyroid disease Father     No Known Problems Sister     Liver cancer Brother     Malig Hyperthermia Neg Hx                ROS  Negative unless listed in the HPI        Physical Exam  69 y.o. male  Body mass index is 27.62 kg/m²., 84.8 kg (187 lb)  Vitals:    03/06/25 0820   Resp: 20   SpO2: 98%     General: Alert, cooperative, appears well and in no observable distress.   HEENT: Normocephalic, atraumatic on external visual inspection. No icterus.   CV: No significant peripheral edema.   Respiratory: Normal  respiratory effort.   Skin: Warm & well perfused; appropriate skin turgor.  Psych: Appropriate mood & affect.  Neuro: Gross sensation and motor intact in affected extremity/extremities.  Vascular: Peripheral pulses palpable in affected extremity/extremities.         Procedure:  Large Joint Arthrocentesis: R subacromial bursa  Date/Time: 3/6/2025 8:55 AM  Consent given by: patient  Site marked: site marked  Timeout: Immediately prior to procedure a time out was called to verify the correct patient, procedure, equipment, support staff and site/side marked as required   Supporting Documentation  Indications: pain   Procedure Details  Location: shoulder - R subacromial bursa  Needle size: 25 G  Approach: posterior  Medications administered: 2 mL lidocaine PF 1% 1 %; 80 mg triamcinolone acetonide 40 MG/ML  Patient tolerance: patient tolerated the procedure well with no immediate complications            Assessment:   Diagnoses and all orders for this visit:    1. Tendinosis of right shoulder (Primary)    2. Chronic right shoulder pain      Body mass index is 27.62 kg/m².  BMI consistent with Overweight: 25.0-29.9kg/m2         Plan:   -     Risks and benefits of injection therapy discussed with patient.    Injected patient's right shoulder joint(s)with Kenalog from an posterior approach    Rest, ice, compression, and elevation (RICE) therapy  OTC Tylenol for pain PRN  Follow up in 3 months or PRN   Please call with questions or concerns in the interim        Date of encounter: 03/06/2025   NALDO Mirza    Patient or patient representative verbalized consent for the use of Ambient Listening during the visit with  NALDO Mirza for chart documentation. 3/6/2025  08:54 EST

## 2025-04-11 ENCOUNTER — LAB (OUTPATIENT)
Dept: FAMILY MEDICINE CLINIC | Facility: CLINIC | Age: 70
End: 2025-04-11
Payer: MEDICARE

## 2025-04-11 DIAGNOSIS — E03.9 ACQUIRED HYPOTHYROIDISM: ICD-10-CM

## 2025-04-11 DIAGNOSIS — Z13.220 SCREENING FOR HYPERLIPIDEMIA: ICD-10-CM

## 2025-04-11 DIAGNOSIS — Z13.1 SCREENING FOR DIABETES MELLITUS: ICD-10-CM

## 2025-04-11 DIAGNOSIS — I10 ESSENTIAL HYPERTENSION: ICD-10-CM

## 2025-04-11 LAB
ALBUMIN UR-MCNC: <1.2 MG/DL
ANION GAP SERPL CALCULATED.3IONS-SCNC: 9.9 MMOL/L (ref 5–15)
BUN SERPL-MCNC: 26 MG/DL (ref 8–23)
BUN/CREAT SERPL: 27.7 (ref 7–25)
CALCIUM SPEC-SCNC: 9.3 MG/DL (ref 8.6–10.5)
CHLORIDE SERPL-SCNC: 106 MMOL/L (ref 98–107)
CHOLEST SERPL-MCNC: 225 MG/DL (ref 0–200)
CO2 SERPL-SCNC: 26.1 MMOL/L (ref 22–29)
CREAT SERPL-MCNC: 0.94 MG/DL (ref 0.76–1.27)
CREAT UR-MCNC: 166.2 MG/DL
EGFRCR SERPLBLD CKD-EPI 2021: 87.2 ML/MIN/1.73
GLUCOSE SERPL-MCNC: 89 MG/DL (ref 65–99)
HBA1C MFR BLD: 5.2 % (ref 4.8–5.6)
HDLC SERPL-MCNC: 85 MG/DL (ref 40–60)
LDLC SERPL CALC-MCNC: 132 MG/DL (ref 0–100)
LDLC/HDLC SERPL: 1.54 {RATIO}
MICROALBUMIN/CREAT UR: NORMAL MG/G{CREAT}
POTASSIUM SERPL-SCNC: 4.3 MMOL/L (ref 3.5–5.2)
SODIUM SERPL-SCNC: 142 MMOL/L (ref 136–145)
TRIGL SERPL-MCNC: 47 MG/DL (ref 0–150)
TSH SERPL DL<=0.05 MIU/L-ACNC: 1.34 UIU/ML (ref 0.27–4.2)
VLDLC SERPL-MCNC: 8 MG/DL (ref 5–40)

## 2025-04-11 PROCEDURE — 36415 COLL VENOUS BLD VENIPUNCTURE: CPT

## 2025-04-11 PROCEDURE — 82570 ASSAY OF URINE CREATININE: CPT | Performed by: STUDENT IN AN ORGANIZED HEALTH CARE EDUCATION/TRAINING PROGRAM

## 2025-04-11 PROCEDURE — 80048 BASIC METABOLIC PNL TOTAL CA: CPT | Performed by: STUDENT IN AN ORGANIZED HEALTH CARE EDUCATION/TRAINING PROGRAM

## 2025-04-11 PROCEDURE — 83036 HEMOGLOBIN GLYCOSYLATED A1C: CPT | Performed by: STUDENT IN AN ORGANIZED HEALTH CARE EDUCATION/TRAINING PROGRAM

## 2025-04-11 PROCEDURE — 80061 LIPID PANEL: CPT | Performed by: STUDENT IN AN ORGANIZED HEALTH CARE EDUCATION/TRAINING PROGRAM

## 2025-04-11 PROCEDURE — 82043 UR ALBUMIN QUANTITATIVE: CPT | Performed by: STUDENT IN AN ORGANIZED HEALTH CARE EDUCATION/TRAINING PROGRAM

## 2025-04-11 PROCEDURE — 84443 ASSAY THYROID STIM HORMONE: CPT | Performed by: STUDENT IN AN ORGANIZED HEALTH CARE EDUCATION/TRAINING PROGRAM

## 2025-04-14 ENCOUNTER — LAB (OUTPATIENT)
Dept: FAMILY MEDICINE CLINIC | Facility: CLINIC | Age: 70
End: 2025-04-14
Payer: MEDICARE

## 2025-04-14 ENCOUNTER — OFFICE VISIT (OUTPATIENT)
Dept: FAMILY MEDICINE CLINIC | Facility: CLINIC | Age: 70
End: 2025-04-14
Payer: MEDICARE

## 2025-04-14 VITALS
DIASTOLIC BLOOD PRESSURE: 82 MMHG | SYSTOLIC BLOOD PRESSURE: 142 MMHG | OXYGEN SATURATION: 98 % | HEART RATE: 94 BPM | BODY MASS INDEX: 28.38 KG/M2 | WEIGHT: 191.6 LBS | HEIGHT: 69 IN

## 2025-04-14 DIAGNOSIS — Z23 NEED FOR IMMUNIZATION AGAINST MEASLES: ICD-10-CM

## 2025-04-14 DIAGNOSIS — R23.3 EASY BRUISING: ICD-10-CM

## 2025-04-14 DIAGNOSIS — R61 NIGHT SWEATS: ICD-10-CM

## 2025-04-14 DIAGNOSIS — Z00.00 ENCOUNTER FOR SUBSEQUENT ANNUAL WELLNESS VISIT (AWV) IN MEDICARE PATIENT: Primary | ICD-10-CM

## 2025-04-14 DIAGNOSIS — E78.2 MIXED HYPERLIPIDEMIA: ICD-10-CM

## 2025-04-14 LAB
ALBUMIN SERPL-MCNC: 4.3 G/DL (ref 3.5–5.2)
ALP SERPL-CCNC: 64 U/L (ref 39–117)
ALT SERPL W P-5'-P-CCNC: 18 U/L (ref 1–41)
AST SERPL-CCNC: 19 U/L (ref 1–40)
BASOPHILS # BLD AUTO: 0.02 10*3/MM3 (ref 0–0.2)
BASOPHILS NFR BLD AUTO: 0.3 % (ref 0–1.5)
BILIRUB CONJ SERPL-MCNC: 0.2 MG/DL (ref 0–0.3)
BILIRUB INDIRECT SERPL-MCNC: 0.3 MG/DL
BILIRUB SERPL-MCNC: 0.5 MG/DL (ref 0–1.2)
CRP SERPL-MCNC: 0.61 MG/DL (ref 0–0.5)
DEPRECATED RDW RBC AUTO: 44.4 FL (ref 37–54)
EOSINOPHIL # BLD AUTO: 0.05 10*3/MM3 (ref 0–0.4)
EOSINOPHIL NFR BLD AUTO: 0.8 % (ref 0.3–6.2)
ERYTHROCYTE [DISTWIDTH] IN BLOOD BY AUTOMATED COUNT: 13 % (ref 12.3–15.4)
HCT VFR BLD AUTO: 45.7 % (ref 37.5–51)
HGB BLD-MCNC: 14.7 G/DL (ref 13–17.7)
IMM GRANULOCYTES # BLD AUTO: 0.02 10*3/MM3 (ref 0–0.05)
IMM GRANULOCYTES NFR BLD AUTO: 0.3 % (ref 0–0.5)
INR PPP: 1.01 (ref 0.9–1.1)
LYMPHOCYTES # BLD AUTO: 1 10*3/MM3 (ref 0.7–3.1)
LYMPHOCYTES NFR BLD AUTO: 16.4 % (ref 19.6–45.3)
MCH RBC QN AUTO: 30.1 PG (ref 26.6–33)
MCHC RBC AUTO-ENTMCNC: 32.2 G/DL (ref 31.5–35.7)
MCV RBC AUTO: 93.6 FL (ref 79–97)
MONOCYTES # BLD AUTO: 0.55 10*3/MM3 (ref 0.1–0.9)
MONOCYTES NFR BLD AUTO: 9 % (ref 5–12)
NEUTROPHILS NFR BLD AUTO: 4.47 10*3/MM3 (ref 1.7–7)
NEUTROPHILS NFR BLD AUTO: 73.2 % (ref 42.7–76)
NRBC BLD AUTO-RTO: 0 /100 WBC (ref 0–0.2)
PLATELET # BLD AUTO: 323 10*3/MM3 (ref 140–450)
PMV BLD AUTO: 9.8 FL (ref 6–12)
PROT SERPL-MCNC: 7.1 G/DL (ref 6–8.5)
PROTHROMBIN TIME: 13.2 SECONDS (ref 11.7–14.2)
RBC # BLD AUTO: 4.88 10*6/MM3 (ref 4.14–5.8)
WBC NRBC COR # BLD AUTO: 6.11 10*3/MM3 (ref 3.4–10.8)

## 2025-04-14 PROCEDURE — 36415 COLL VENOUS BLD VENIPUNCTURE: CPT | Performed by: STUDENT IN AN ORGANIZED HEALTH CARE EDUCATION/TRAINING PROGRAM

## 2025-04-14 PROCEDURE — 86140 C-REACTIVE PROTEIN: CPT | Performed by: STUDENT IN AN ORGANIZED HEALTH CARE EDUCATION/TRAINING PROGRAM

## 2025-04-14 PROCEDURE — 86762 RUBELLA ANTIBODY: CPT | Performed by: STUDENT IN AN ORGANIZED HEALTH CARE EDUCATION/TRAINING PROGRAM

## 2025-04-14 PROCEDURE — 85025 COMPLETE CBC W/AUTO DIFF WBC: CPT | Performed by: STUDENT IN AN ORGANIZED HEALTH CARE EDUCATION/TRAINING PROGRAM

## 2025-04-14 PROCEDURE — 86735 MUMPS ANTIBODY: CPT | Performed by: STUDENT IN AN ORGANIZED HEALTH CARE EDUCATION/TRAINING PROGRAM

## 2025-04-14 PROCEDURE — 85610 PROTHROMBIN TIME: CPT | Performed by: STUDENT IN AN ORGANIZED HEALTH CARE EDUCATION/TRAINING PROGRAM

## 2025-04-14 PROCEDURE — 80076 HEPATIC FUNCTION PANEL: CPT | Performed by: STUDENT IN AN ORGANIZED HEALTH CARE EDUCATION/TRAINING PROGRAM

## 2025-04-14 PROCEDURE — 86765 RUBEOLA ANTIBODY: CPT | Performed by: STUDENT IN AN ORGANIZED HEALTH CARE EDUCATION/TRAINING PROGRAM

## 2025-04-14 NOTE — PROGRESS NOTES
Subjective   The ABCs of the Annual Wellness Visit  Medicare Wellness Visit      John Jones is a 69 y.o. patient who presents for a Medicare Wellness Visit.    The following portions of the patient's history were reviewed and   updated as appropriate: allergies, current medications, past family history, past medical history, past social history, past surgical history, and problem list.    Compared to one year ago, the patient's physical   health is the same.  Compared to one year ago, the patient's mental   health is the same.    Recent Hospitalizations:  He was not admitted to the hospital during the last year.     Current Medical Providers:  Patient Care Team:  Jared Johnson DO as PCP - General (Family Medicine)    Outpatient Medications Prior to Visit   Medication Sig Dispense Refill    amLODIPine (NORVASC) 5 MG tablet Take 1 tablet by mouth 2 (Two) Times a Day. Take 1 tablet by mouth twice daily 180 tablet 3    doxazosin (CARDURA) 4 MG tablet Take 1 tablet by mouth every night at bedtime. 90 tablet 3    levothyroxine (SYNTHROID, LEVOTHROID) 125 MCG tablet TAKE 1 TABLET BY MOUTH ONCE DAILY IN THE MORNING 90 tablet 0    pantoprazole (PROTONIX) 40 MG EC tablet Take 1 tablet by mouth once daily 90 tablet 1     No facility-administered medications prior to visit.     No opioid medication identified on active medication list. I have reviewed chart for other potential  high risk medication/s and harmful drug interactions in the elderly.      Aspirin is not on active medication list.  Aspirin use is not indicated based on review of current medical condition/s. Risk of harm outweighs potential benefits.  .    Patient Active Problem List   Diagnosis    Essential hypertension    Benign prostatic hyperplasia    Hypothyroidism    Laryngopharyngeal reflux (LPR)    Mixed hyperlipidemia     Advance Care Planning Advance Directive is not on file.  ACP discussion was held with the patient during this visit. Patient does  "not have an advance directive, information provided.            Objective   Vitals:    25 1001   BP: 142/82   Pulse: 94   SpO2: 98%   Weight: 86.9 kg (191 lb 9.6 oz)   Height: 175.3 cm (69\")   PainSc: 0-No pain       Estimated body mass index is 28.29 kg/m² as calculated from the following:    Height as of this encounter: 175.3 cm (69\").    Weight as of this encounter: 86.9 kg (191 lb 9.6 oz).    BMI is >= 25 and <30. (Overweight) The following options were offered after discussion;: exercise counseling/recommendations and nutrition counseling/recommendations           Does the patient have evidence of cognitive impairment? No  Lab Results   Component Value Date    TRIG 47 2025    HDL 85 (H) 2025     (H) 2025    VLDL 8 2025    HGBA1C 5.20 2025                                                                                                Health  Risk Assessment    Smoking Status:  Social History     Tobacco Use   Smoking Status Never   Smokeless Tobacco Never     Alcohol Consumption:  Social History     Substance and Sexual Activity   Alcohol Use Yes    Alcohol/week: 1.0 standard drink of alcohol    Types: 1 Cans of beer per week    Comment: Very rarely       Fall Risk Screen  STEADI Fall Risk Assessment was completed, and patient is at LOW risk for falls.Assessment completed on:2025    Depression Screening   Little interest or pleasure in doing things? Not at all   Feeling down, depressed, or hopeless? Not at all   PHQ-2 Total Score 0      Health Habits and Functional and Cognitive Screenin/7/2025     7:36 AM   Functional & Cognitive Status   Do you have difficulty preparing food and eating? No   Do you have difficulty bathing yourself, getting dressed or grooming yourself? No   Do you have difficulty using the toilet? No   Do you have difficulty moving around from place to place? No   Do you have trouble with steps or getting out of a bed or a chair? No "   Current Diet Well Balanced Diet   Dental Exam Up to date   Eye Exam Up to date   Exercise (times per week) 2 times per week   Current Exercises Include Gardening;Hiking;House Cleaning;Walking   Do you need help using the phone?  No   Are you deaf or do you have serious difficulty hearing?  No   Do you need help to go to places out of walking distance? No   Do you need help shopping? No   Do you need help preparing meals?  No   Do you need help with housework?  No   Do you need help with laundry? No   Do you need help taking your medications? No   Do you need help managing money? No   Do you ever drive or ride in a car without wearing a seat belt? No   Have you felt unusual stress, anger or loneliness in the last month? No   Who do you live with? Spouse   If you need help, do you have trouble finding someone available to you? No   Have you been bothered in the last four weeks by sexual problems? No   Do you have difficulty concentrating, remembering or making decisions? No             Age-appropriate Screening Schedule:  Refer to the list below for future screening recommendations based on patient's age, sex and/or medical conditions. Orders for these recommended tests are listed in the plan section. The patient has been provided with a written plan.    Health Maintenance List  Health Maintenance   Topic Date Due    HEPATITIS C SCREENING  Never done    COVID-19 Vaccine (7 - 2024-25 season) 03/20/2025    INFLUENZA VACCINE  07/01/2025    TDAP/TD VACCINES (2 - Td or Tdap) 12/11/2025    LIPID PANEL  04/11/2026    ANNUAL WELLNESS VISIT  04/14/2026    COLORECTAL CANCER SCREENING  06/17/2032    Pneumococcal Vaccine 50+  Completed    ZOSTER VACCINE  Completed                                                                                                                                                CMS Preventative Services Quick Reference  Risk Factors Identified During Encounter  None Identified    The above  "risks/problems have been discussed with the patient.  Pertinent information has been shared with the patient in the After Visit Summary.  An After Visit Summary and PPPS were made available to the patient.    Follow Up:   Next Medicare Wellness visit to be scheduled in 1 year.                 Additional E&M Note during same encounter follows:  Patient has additional, significant, and separately identifiable condition(s)/problem(s) that require work above and beyond the Medicare Wellness Visit     Chief Complaint  Medicare Wellness-subsequent, Bleeding/Bruising, Night Sweats (For the past 6 month or so. ), and Fatigue (Gets wore out easy. )    Subjective   HPI  John is also being seen today for additional medical problem/s.            Easy bruising  Patient reports easy bruising to his bilateral arms  Sure how long the symptoms have been present  Recently got a dog that scratches his arms often and states he tends to bleed longer than he used to.        Night sweats  Intermittent night sweats present for the last few months  States this occurs 2-3 times per week  Denies rigors, fevers, or being drenched in sweat  Denies cough, bloody stools, weight loss, or any other new symptoms        Objective   Vital Signs:  /82   Pulse 94   Ht 175.3 cm (69\")   Wt 86.9 kg (191 lb 9.6 oz)   SpO2 98%   BMI 28.29 kg/m²   Physical Exam               Assessment and Plan      Encounter for subsequent annual wellness visit (AWV) in Medicare patient    Encourage healthy lifestyle choices such as healthy diet choices (low carbohydrates, increase fruits/vegetables, less processed foods, limiting portion sizes) as well as increasing physical activity with goal of 150 minutes of moderate intensity exercise per week.    Routine screening labs performed prior to appointment which overall look good outside of lipid panel (see below).           Mixed hyperlipidemia  Based on ASCVD risk reasonable to start moderate intensity " statin.  Given his age, starting statin may not provide significant benefit.  Discussed option to start medication with him which he declines at this time.  Discussed lifestyle modifications that may be helpful including dietary changes as well as increased exercise levels.         Easy bruising  Potentially related to underlying skin changes that occur with older age  Will obtain labs as noted below to evaluate for secondary causes  Thankfully he has not having bleeding in other locations such as epistaxis, GI bleed so risk for underlying bleeding diathesis unlikely at this time.      Orders:    CBC & Differential    Hepatic Function Panel    Protime-INR    Night sweats  No red flags based on history or exam  Labs as noted below.  If labs unremarkable continue to monitor.  If symptoms get worse may need further evaluation      Orders:    CBC & Differential    C-reactive protein    Need for immunization against measles    Orders:    Measles / Mumps / Rubella Immunity            Follow Up   Return in about 6 months (around 10/14/2025) for Recheck.  Patient was given instructions and counseling regarding his condition or for health maintenance advice. Please see specific information pulled into the AVS if appropriate.

## 2025-04-14 NOTE — ASSESSMENT & PLAN NOTE
Based on ASCVD risk reasonable to start moderate intensity statin.  Given his age, starting statin may not provide significant benefit.  Discussed option to start medication with him which he declines at this time.  Discussed lifestyle modifications that may be helpful including dietary changes as well as increased exercise levels.

## 2025-04-15 LAB
MEV IGG SER IA-ACNC: >300 AU/ML
MUV IGG SER IA-ACNC: 112 AU/ML
RUBV IGG SERPL IA-ACNC: 29.6 INDEX

## 2025-06-16 ENCOUNTER — OFFICE VISIT (OUTPATIENT)
Dept: FAMILY MEDICINE CLINIC | Facility: CLINIC | Age: 70
End: 2025-06-16
Payer: MEDICARE

## 2025-06-16 VITALS
HEIGHT: 69 IN | SYSTOLIC BLOOD PRESSURE: 140 MMHG | BODY MASS INDEX: 28.56 KG/M2 | DIASTOLIC BLOOD PRESSURE: 80 MMHG | WEIGHT: 192.8 LBS | HEART RATE: 75 BPM | OXYGEN SATURATION: 99 %

## 2025-06-16 DIAGNOSIS — N50.811 RIGHT TESTICULAR PAIN: Primary | ICD-10-CM

## 2025-06-16 PROCEDURE — 1126F AMNT PAIN NOTED NONE PRSNT: CPT | Performed by: STUDENT IN AN ORGANIZED HEALTH CARE EDUCATION/TRAINING PROGRAM

## 2025-06-16 PROCEDURE — G2211 COMPLEX E/M VISIT ADD ON: HCPCS | Performed by: STUDENT IN AN ORGANIZED HEALTH CARE EDUCATION/TRAINING PROGRAM

## 2025-06-16 PROCEDURE — 99213 OFFICE O/P EST LOW 20 MIN: CPT | Performed by: STUDENT IN AN ORGANIZED HEALTH CARE EDUCATION/TRAINING PROGRAM

## 2025-06-16 PROCEDURE — 1160F RVW MEDS BY RX/DR IN RCRD: CPT | Performed by: STUDENT IN AN ORGANIZED HEALTH CARE EDUCATION/TRAINING PROGRAM

## 2025-06-16 PROCEDURE — 3077F SYST BP >= 140 MM HG: CPT | Performed by: STUDENT IN AN ORGANIZED HEALTH CARE EDUCATION/TRAINING PROGRAM

## 2025-06-16 PROCEDURE — 3079F DIAST BP 80-89 MM HG: CPT | Performed by: STUDENT IN AN ORGANIZED HEALTH CARE EDUCATION/TRAINING PROGRAM

## 2025-06-16 PROCEDURE — 1159F MED LIST DOCD IN RCRD: CPT | Performed by: STUDENT IN AN ORGANIZED HEALTH CARE EDUCATION/TRAINING PROGRAM

## 2025-06-16 NOTE — PROGRESS NOTES
"Chief Complaint  Chief Complaint   Patient presents with    Groin Pain     For a few months now. Nagging pain.        Subjective        John Jones is a 69 y.o. male who presents to [facility].  History of Present Illness    John is a 69-year-old male here for testicle pain.      Patient reports pain in his right testicle for the last few months.  Notices the pain more with bending over or when his dog jumps on him.  Describes pain as a \"pressure\"  Denies any dysuria, rectal pain, fevers, changes in bowel movements  Does endorse urinary frequency but states this is chronic and no different than usual  Pain has not been bad enough to require any pain medication      Objective   /80   Pulse 75   Ht 175.3 cm (69\")   Wt 87.5 kg (192 lb 12.8 oz)   SpO2 99%   BMI 28.47 kg/m²     Estimated body mass index is 28.47 kg/m² as calculated from the following:    Height as of this encounter: 175.3 cm (69\").    Weight as of this encounter: 87.5 kg (192 lb 12.8 oz).     Physical Exam   GEN: In no acute distress, non toxic appearing  HEENT: MMM. EOMI.   CV: No extremity edema.   RESP: No signs of respiratory distress.  SKIN: No rashes  MSK: No deformity.   NEURO: Moves all extremities equally. Alert and appropriate.  : No inguinal hernia palpated bilaterally.  Right testicle is mildly tender to palpation in the posterior region.  Testicles are uniform without obvious nodule/mass.  No varicocele palpated.  Cremasteric reflex intact bilaterally.        Result Review :              Assessment and Plan     Diagnoses and all orders for this visit:    1. Right testicular pain (Primary)  Undiagnosed new problem of uncertain prognosis    Overall normal exam outside of some mild tenderness in the posterior region of his right testicle.     Patient unable to provide urine sample in the office today.   Will obtain ultrasound to evaluate further; if ultrasound is normal will have patient return to " the office to send urine for culture.    Can take Tylenol, ibuprofen as needed for pain    -     US Scrotum & Testicles; Future            Follow Up     No follow-ups on file.

## 2025-06-24 ENCOUNTER — TELEMEDICINE (OUTPATIENT)
Dept: FAMILY MEDICINE CLINIC | Facility: TELEHEALTH | Age: 70
End: 2025-06-24
Payer: MEDICARE

## 2025-06-24 DIAGNOSIS — J06.9 UPPER RESPIRATORY TRACT INFECTION, UNSPECIFIED TYPE: Primary | ICD-10-CM

## 2025-06-24 PROBLEM — M79.672 PAIN IN LEFT FOOT: Status: ACTIVE | Noted: 2024-08-16

## 2025-06-24 PROBLEM — M19.079 PRIMARY LOCALIZED OSTEOARTHROSIS OF ANKLE AND FOOT: Status: ACTIVE | Noted: 2023-12-18

## 2025-06-24 PROBLEM — G56.20 ULNAR NERVE ENTRAPMENT AT ELBOW: Status: ACTIVE | Noted: 2024-12-18

## 2025-06-24 PROBLEM — M19.071 OSTEOARTHRITIS OF BOTH FEET: Status: ACTIVE | Noted: 2023-12-18

## 2025-06-24 PROBLEM — M19.072 OSTEOARTHRITIS OF BOTH FEET: Status: ACTIVE | Noted: 2023-12-18

## 2025-06-24 PROBLEM — Q66.229 METATARSUS ADDUCTUS: Status: ACTIVE | Noted: 2023-12-18

## 2025-06-24 PROBLEM — G57.62 MORTON'S NEUROMA OF LEFT FOOT: Status: ACTIVE | Noted: 2023-12-18

## 2025-06-24 PROBLEM — M77.42 METATARSALGIA OF LEFT FOOT: Status: ACTIVE | Noted: 2023-12-18

## 2025-06-24 PROBLEM — M79.671 PAIN IN RIGHT FOOT: Status: ACTIVE | Noted: 2023-12-18

## 2025-06-24 PROBLEM — G56.00 CARPAL TUNNEL SYNDROME: Status: ACTIVE | Noted: 2024-12-18

## 2025-06-24 PROCEDURE — 99213 OFFICE O/P EST LOW 20 MIN: CPT | Performed by: NURSE PRACTITIONER

## 2025-06-24 RX ORDER — FLUTICASONE PROPIONATE 50 MCG
2 SPRAY, SUSPENSION (ML) NASAL DAILY
Qty: 16 G | Refills: 0 | Status: SHIPPED | OUTPATIENT
Start: 2025-06-24 | End: 2025-07-01

## 2025-06-24 RX ORDER — BENZONATATE 100 MG/1
CAPSULE ORAL
Qty: 30 CAPSULE | Refills: 0 | Status: SHIPPED | OUTPATIENT
Start: 2025-06-24

## 2025-06-25 ENCOUNTER — HOSPITAL ENCOUNTER (OUTPATIENT)
Dept: ULTRASOUND IMAGING | Facility: HOSPITAL | Age: 70
Discharge: HOME | End: 2025-06-25
Admitting: STUDENT IN AN ORGANIZED HEALTH CARE EDUCATION/TRAINING PROGRAM
Payer: MEDICARE

## 2025-06-25 DIAGNOSIS — N50.811 RIGHT TESTICULAR PAIN: ICD-10-CM

## 2025-06-25 PROCEDURE — 76870 US EXAM SCROTUM: CPT

## 2025-06-25 PROCEDURE — 93976 VASCULAR STUDY: CPT

## 2025-06-25 NOTE — PROGRESS NOTES
You have chosen to receive care through a telehealth visit.  Do you consent to use a video/audio connection for your medical care today? Yes     CHIEF COMPLAINT  No chief complaint on file.        HPI  John Jones is a 69 y.o. male  presents with complaint of Cough, throat irritation, congestion. Reports he has Cough and laryngitis. 100.5 temp today. Reports post nasal drainage. Reports his symptoms started 2 days ago. Reports no chills. Mild nausea last night no vomiting. No CP or SOA. No wheezing. Reports he has taken some Nyquil. Reports he has a lot of phlegm in his throat. Reports he has taken 2 COVID test that were negative. Wife has same symptoms.voice is hoarse.     Review of Systems   Constitutional:  Positive for fever. Negative for chills and fatigue.   HENT:  Positive for congestion, postnasal drip and voice change. Negative for ear discharge, ear pain, sinus pressure, sinus pain and sore throat.    Respiratory:  Positive for cough. Negative for chest tightness, shortness of breath and wheezing.    Cardiovascular:  Negative for chest pain.   Gastrointestinal:  Positive for nausea. Negative for abdominal pain, diarrhea and vomiting.   Genitourinary:         Patient reports he is having a US of his testicle tomorrow due to having some pain in it. Was seen on 6-16-25    Musculoskeletal:  Negative for back pain and myalgias.   Neurological:  Negative for dizziness and headaches.   Psychiatric/Behavioral: Negative.         Past Medical History:   Diagnosis Date    Ankle sprain     Arthritis     Biceps tendon rupture     right    BPH (benign prostatic hyperplasia)     Cervical disc disorder 1 year ago    Cervical spinal stenosis     Colon polyp ?    1 polyp about 15 years ago    CTS (carpal tunnel syndrome) 10 years ago    Early cataract     Fracture of ankle     Childhood injury    History of hepatitis A     30 YEARS AGO    Hypertension     Hypothyroidism     Infectious viral hepatitis 30 plus years ago     Hepatitis A    Knee osteoarthritis     Knee pain, bilateral     Low back strain     40 years ago    Spinal stenosis     NUMBNESS/TINGLING LEFT ARM    Tear of meniscus of knee     Tennis elbow        Family History   Problem Relation Age of Onset    Pancreatic cancer Mother     Cancer Mother     Heart disease Father         bladder cancer    Arthritis Father     Cancer Father     Hyperlipidemia Father     Thyroid disease Father     No Known Problems Sister     Liver cancer Brother     Malig Hyperthermia Neg Hx        Social History     Socioeconomic History    Marital status:     Number of children: 2    Years of education: 12    Highest education level: High school graduate   Tobacco Use    Smoking status: Never    Smokeless tobacco: Never   Vaping Use    Vaping status: Never Used    Passive vaping exposure: Yes   Substance and Sexual Activity    Alcohol use: Yes     Alcohol/week: 1.0 standard drink of alcohol     Types: 1 Cans of beer per week     Comment: Very rarely    Drug use: No    Sexual activity: Yes     Partners: Female     Birth control/protection: None       John Jones  reports that he has never smoked. He has never used smokeless tobacco. I have educated him on the risk of diseases from using tobacco products such as cancer, COPD, and heart disease.       I spent 1 minutes counseling the patient.              There were no vitals taken for this visit.    PHYSICAL EXAM  Physical Exam   Constitutional: He is oriented to person, place, and time. He appears well-developed and well-nourished. He does not have a sickly appearance. No distress.   HENT:   Head: Normocephalic and atraumatic.   Right Ear: Hearing normal.   Left Ear: Hearing normal.   Nose: Congestion present.   Mouth/Throat: Mouth/Lips are normal.Oropharynx is clear and moist.   Patient directed exam   Eyes: Conjunctivae and lids are normal.   Pulmonary/Chest: Effort normal.  No respiratory distress.  Neurological: He is alert and oriented  to person, place, and time.   Psychiatric: He has a normal mood and affect. His speech is normal and behavior is normal.   Patient is speaking in full sentences.  No respiratory distress noted.  Patient's voice is mildly hoarse.        Diagnoses and all orders for this visit:    1. Upper respiratory tract infection, unspecified type (Primary)    Other orders  -     fluticasone (FLONASE) 50 MCG/ACT nasal spray; Administer 2 sprays into the nostril(s) as directed by provider Daily for 7 days.  Dispense: 16 g; Refill: 0  -     benzonatate (Tessalon Perles) 100 MG capsule; Take 1 or 2 perles tid prn cough  Dispense: 30 capsule; Refill: 0    Rest  Increase fluids  Use humidifier  PCP if symptoms continue  ER for any worsening symptoms such as high fever, chest pain or shortness of air        FOLLOW-UP  As discussed during visit with PCP/[facility] if no improvement or Urgent Care/Emergency Department if worsening of symptoms    Patient verbalizes understanding of medication dosage, comfort measures, instructions for treatment and follow-up.    Vanessa Grimes, APRN  06/24/2025  20:43 EDT    Mode of Visit: Video  Location of patient: -HOME-  Location of provider: +HOME+  You have chosen to receive care through a telehealth visit.  The patient has signed the video visit consent form.  The visit included audio and video interaction. No technical issues occurred during this visit.      The use of a video visit has been reviewed with the patient and verbal informed consent has been obtained. Myself and John Jones participated in this visit. The patient is located in [address] [place] [address] [place] [facility].   I am located in [place], [place]. [employer] and [employer] Video Client were utilized. I spent 5 minutes in the patient's chart for this visit.         Note Disclaimer: At [facility], we believe that sharing information builds trust and better   relationships. You are receiving this note because you recently visited [facility]. It is possible you   will see health information before a provider has talked with you about it. This kind of information can   be easy to misunderstand. To help you fully understand what it means for your health, we urge you to   discuss this note with your provider.

## 2025-06-25 NOTE — PATIENT INSTRUCTIONS
Upper Respiratory Infection, Adult  An upper respiratory infection (URI) is a common viral infection of the nose, throat, and upper air passages that lead to the lungs. The most common type of URI is the common cold. URIs usually get better on their own, without medical treatment.  What are the causes?  A URI is caused by a virus. You may catch a virus by:  Breathing in droplets from an infected person's cough or sneeze.  Touching something that has been exposed to the virus (is contaminated) and then touching your mouth, nose, or eyes.  What increases the risk?  You are more likely to get a URI if:  You are very young or very old.  You have close contact with others, such as at work, school, or a health care facility.  You smoke.  You have long-term (chronic) heart or lung disease.  You have a weakened disease-fighting system (immune system).  You have nasal allergies or asthma.  You are experiencing a lot of stress.  You have poor nutrition.  What are the signs or symptoms?  A URI usually involves some of the following symptoms:  Runny or stuffy (congested) nose.  Cough.  Sneezing.  Sore throat.  Headache.  Fatigue.  Fever.  Loss of appetite.  Pain in your forehead, behind your eyes, and over your cheekbones (sinus pain).  Muscle aches.  Redness or irritation of the eyes.  Pressure in the ears or face.  How is this diagnosed?  This condition may be diagnosed based on your medical history and symptoms, and a physical exam. Your health care provider may use a swab to take a mucus sample from your nose (nasal swab). This sample can be tested to determine what virus is causing the illness.  How is this treated?  URIs usually get better on their own within 7-10 days. Medicines cannot cure URIs, but your health care provider may recommend certain medicines to help relieve symptoms, such as:  Over-the-counter cold medicines.  Cough suppressants. Coughing is a type of defense against infection that helps to clear the  respiratory system, so take these medicines only as recommended by your health care provider.  Fever-reducing medicines.  Follow these instructions at home:  Activity  Rest as needed.  If you have a fever, stay home from work or school until your fever is gone or until your health care provider says your URI cannot spread to other people (is no longer contagious). Your health care provider may have you wear a face mask to prevent your infection from spreading.  Relieving symptoms  Gargle with a mixture of salt and water 3-4 times a day or as needed. To make salt water, completely dissolve ½-1 tsp (3-6 g) of salt in 1 cup (237 mL) of warm water.  Use a cool-mist humidifier to add moisture to the air. This can help you breathe more easily.  Eating and drinking    Drink enough fluid to keep your urine pale yellow.  Eat soups and other clear broths.  General instructions    Take over-the-counter and prescription medicines only as told by your health care provider. These include cold medicines, fever reducers, and cough suppressants.  Do not use any products that contain nicotine or tobacco. These products include cigarettes, chewing tobacco, and vaping devices, such as e-cigarettes. If you need help quitting, ask your health care provider.  Stay away from secondhand smoke.  Stay up to date on all immunizations, including the yearly (annual) flu vaccine.  Keep all follow-up visits. This is important.  How to prevent the spread of infection to others  URIs can be contagious. To prevent the infection from spreading:  Wash your hands with soap and water for at least 20 seconds. If soap and water are not available, use hand .  Avoid touching your mouth, face, eyes, or nose.  Cough or sneeze into a tissue or your sleeve or elbow instead of into your hand or into the air.    Contact a health care provider if:  You are getting worse instead of better.  You have a fever or chills.  Your mucus is brown or red.  You have  yellow or brown discharge coming from your nose.  You have pain in your face, especially when you bend forward.  You have swollen neck glands.  You have pain while swallowing.  You have white areas in the back of your throat.  Get help right away if:  You have shortness of breath that gets worse.  You have severe or persistent:  Headache.  Ear pain.  Sinus pain.  Chest pain.  You have chronic lung disease along with any of the following:  Making high-pitched whistling sounds when you breathe, most often when you breathe out (wheezing).  Prolonged cough (more than 14 days).  Coughing up blood.  A change in your usual mucus.  You have a stiff neck.  You have changes in your:  Vision.  Hearing.  Thinking.  Mood.  These symptoms may be an emergency. Get help right away. Call 911.  Do not wait to see if the symptoms will go away.  Do not drive yourself to the hospital.  Summary  An upper respiratory infection (URI) is a common infection of the nose, throat, and upper air passages that lead to the lungs.  A URI is caused by a virus.  URIs usually get better on their own within 7-10 days.  Medicines cannot cure URIs, but your health care provider may recommend certain medicines to help relieve symptoms.  This information is not intended to replace advice given to you by your health care provider. Make sure you discuss any questions you have with your health care provider.  Document Revised: 07/20/2022 Document Reviewed: 07/20/2022  [employer] Patient Education © 2024 [employer]Cough, Adult  Coughing is a reflex that clears your throat and airways (respiratory system). It helps heal and protect your lungs. It is normal to cough from time to time. A cough that happens with other symptoms or that lasts a long time may be a sign of a condition that needs treatment. A short-term (acute) cough may only last 2-3 weeks. A long-term (chronic) cough may last 8 or more weeks.  Coughing is often caused by:  Diseases, such as:  An  infection of the respiratory system.  Asthma or other heart or lung diseases.  Gastroesophageal reflux. This is when acid comes back up from the stomach.  Breathing in things that irritate your lungs.  Allergies.  Postnasal drip. This is when mucus runs down the back of your throat.  Smoking.  Some medicines.  Follow these instructions at home:  Medicines  Take over-the-counter and prescription medicines only as told by your health care provider.  Talk with your provider before you take cough medicine (cough suppressants).  Eating and drinking  Do not drink alcohol.  Avoid caffeine.  Drink enough fluid to keep your pee (urine) pale yellow.  Lifestyle  Avoid cigarette smoke.  Do not use any products that contain nicotine or tobacco. These products include cigarettes, chewing tobacco, and vaping devices, such as e-cigarettes. If you need help quitting, ask your provider.  Avoid things that make you cough. These may include perfumes, candles, cleaning products, or campfire smoke.  General instructions    Watch for any changes to your cough. Tell your provider about them.  Always cover your mouth when you cough.  If the air is dry in your bedroom or home, use a cool mist vaporizer or humidifier.  If your cough is worse at night, try to sleep in a semi-upright position.  Rest as needed.  Contact a health care provider if:  You have new symptoms, or your symptoms get worse.  You cough up pus.  You have a fever that does not go away or a cough that does not get better after 2-3 weeks.  You cannot control your cough with medicine, and you are losing sleep.  You have pain that gets worse or is not helped with medicine.  You lose weight for no clear reason.  You have night sweats.  Get help right away if:  You cough up blood.  You have trouble breathing.  Your heart is beating very fast.  These symptoms may be an emergency. Get help right away. Call 911.  Do not wait to see if the symptoms will go away.  Do not drive yourself  to the hospital.  This information is not intended to replace advice given to you by your health care provider. Make sure you discuss any questions you have with your health care provider.  Document Revised: 08/18/2023 Document Reviewed: 08/18/2023  [employer] Patient Education © 2024 [employer]

## 2025-06-30 ENCOUNTER — TELEPHONE (OUTPATIENT)
Dept: FAMILY MEDICINE CLINIC | Facility: CLINIC | Age: 70
End: 2025-06-30

## 2025-06-30 NOTE — TELEPHONE ENCOUNTER
Caller: John Jones    Relationship: Self    Best call back number: [phone]     What test was performed: ULTRASOUND OF GROIN    When was the test performed: 06-    Where was the test performed: [facility]    Additional notes: PATIENT IS REQUESTING TO KNOW THE RESULTS OF THIS ULTRASOUND.    PLEASE CALL TO ADVISE OF RESULTS.

## 2025-07-03 ENCOUNTER — LAB (OUTPATIENT)
Dept: FAMILY MEDICINE CLINIC | Facility: CLINIC | Age: 70
End: 2025-07-03
Payer: MEDICARE

## 2025-07-03 DIAGNOSIS — N50.811 RIGHT TESTICULAR PAIN: ICD-10-CM

## 2025-07-03 LAB
BACTERIA UR QL AUTO: NORMAL /HPF
BILIRUB UR QL STRIP: NEGATIVE
CLARITY UR: CLEAR
COLOR UR: YELLOW
GLUCOSE UR STRIP-MCNC: NEGATIVE MG/DL
HGB UR QL STRIP.AUTO: ABNORMAL
HOLD SPECIMEN: NORMAL
HYALINE CASTS UR QL AUTO: NORMAL /LPF
KETONES UR QL STRIP: NEGATIVE
LEUKOCYTE ESTERASE UR QL STRIP.AUTO: NEGATIVE
NITRITE UR QL STRIP: NEGATIVE
PH UR STRIP.AUTO: 7 [PH] (ref 5–8)
PROT UR QL STRIP: NEGATIVE
RBC # UR STRIP: NORMAL /HPF
REF LAB TEST METHOD: NORMAL
SP GR UR STRIP: 1.01 (ref 1–1.03)
SQUAMOUS #/AREA URNS HPF: NORMAL /HPF
UROBILINOGEN UR QL STRIP: ABNORMAL
WBC # UR STRIP: NORMAL /HPF

## 2025-07-03 PROCEDURE — 81001 URINALYSIS AUTO W/SCOPE: CPT | Performed by: PHYSICIAN ASSISTANT

## (undated) DEVICE — ANTIBACTERIAL UNDYED BRAIDED (POLYGLACTIN 910), SYNTHETIC ABSORBABLE SUTURE: Brand: COATED VICRYL

## (undated) DEVICE — ELECTRD BLD EDGE/INSUL1P 2.4X5.1MM STRL

## (undated) DEVICE — SUT SILK 2/0 TIES 18IN A185H

## (undated) DEVICE — DRN WND JP RND W TROC SIL 10F 1/8IN

## (undated) DEVICE — 1 ML TUBERCULIN SYRINGE REGULAR TIP: Brand: MONOJECT

## (undated) DEVICE — SUT SILK 2/0 FS BLK 18IN 685G

## (undated) DEVICE — GLV SURG BIOGEL LTX PF 6 1/2

## (undated) DEVICE — SMOKE EVACUATION TUBING WITH 7/8 IN TO 1/4 IN REDUCER: Brand: BUFFALO FILTER

## (undated) DEVICE — DISPOSABLE IRRIGATION BIPOLAR CORD, M1000 TYPE: Brand: KIRWAN

## (undated) DEVICE — GLV SURG BIOGEL LTX PF 7

## (undated) DEVICE — DRSNG WND GZ PAD BORDERED 4X8IN STRL

## (undated) DEVICE — CODMAN® SURGICAL PATTIES 3/4" X 3/4" (1.91CM X 1.91CM): Brand: CODMAN®

## (undated) DEVICE — JACKSON-PRATT 100CC BULB RESERVOIR: Brand: CARDINAL HEALTH

## (undated) DEVICE — CONN TBG Y 5 IN 1 LF STRL

## (undated) DEVICE — COVER,TABLE,HEAVY DUTY,79"X110",STRL: Brand: MEDLINE

## (undated) DEVICE — SHEET, DRAPE, SPLIT, STERILE: Brand: MEDLINE

## (undated) DEVICE — TUBING, SUCTION, 1/4" X 20', STRAIGHT: Brand: MEDLINE INDUSTRIES, INC.

## (undated) DEVICE — ADHS SKIN DERMABOND TOP ADVANCED

## (undated) DEVICE — GLV SURG SENSICARE W/ALOE PF LF 7.5 STRL

## (undated) DEVICE — PK NEURO SPINE 40

## (undated) DEVICE — 6.0MM PRECISION ROUND

## (undated) DEVICE — NDL SPINE 18G 31/2IN PNK

## (undated) DEVICE — 3.0MM NEURO (MATCH HEAD) LESS AGGRESSIVE

## (undated) DEVICE — COLR CERV 3IN XL

## (undated) DEVICE — DRP MICROSCOPE 4 BINOCULAR CV 54X150IN

## (undated) DEVICE — APPL CHLORAPREP W/TINT 10.5ML PERC STRL

## (undated) DEVICE — Device